# Patient Record
Sex: MALE | Race: WHITE | NOT HISPANIC OR LATINO | URBAN - METROPOLITAN AREA
[De-identification: names, ages, dates, MRNs, and addresses within clinical notes are randomized per-mention and may not be internally consistent; named-entity substitution may affect disease eponyms.]

---

## 2023-07-07 ENCOUNTER — INPATIENT (INPATIENT)
Facility: HOSPITAL | Age: 68
LOS: 6 days | Discharge: HOME | End: 2023-07-14
Attending: INTERNAL MEDICINE | Admitting: INTERNAL MEDICINE
Payer: MEDICARE

## 2023-07-07 VITALS
TEMPERATURE: 98 F | OXYGEN SATURATION: 100 % | RESPIRATION RATE: 18 BRPM | SYSTOLIC BLOOD PRESSURE: 134 MMHG | HEART RATE: 74 BPM | DIASTOLIC BLOOD PRESSURE: 90 MMHG

## 2023-07-07 DIAGNOSIS — N04.9 NEPHROTIC SYNDROME WITH UNSPECIFIED MORPHOLOGIC CHANGES: ICD-10-CM

## 2023-07-07 DIAGNOSIS — Z29.9 ENCOUNTER FOR PROPHYLACTIC MEASURES, UNSPECIFIED: ICD-10-CM

## 2023-07-07 DIAGNOSIS — Z96.651 PRESENCE OF RIGHT ARTIFICIAL KNEE JOINT: Chronic | ICD-10-CM

## 2023-07-07 DIAGNOSIS — I50.9 HEART FAILURE, UNSPECIFIED: ICD-10-CM

## 2023-07-07 DIAGNOSIS — D64.9 ANEMIA, UNSPECIFIED: ICD-10-CM

## 2023-07-07 DIAGNOSIS — J98.4 OTHER DISORDERS OF LUNG: ICD-10-CM

## 2023-07-07 DIAGNOSIS — E87.70 FLUID OVERLOAD, UNSPECIFIED: ICD-10-CM

## 2023-07-07 DIAGNOSIS — R77.8 OTHER SPECIFIED ABNORMALITIES OF PLASMA PROTEINS: ICD-10-CM

## 2023-07-07 LAB
ALBUMIN SERPL ELPH-MCNC: 3.2 G/DL — LOW (ref 3.3–5)
ALP SERPL-CCNC: 76 U/L — SIGNIFICANT CHANGE UP (ref 40–120)
ALT FLD-CCNC: 23 U/L — SIGNIFICANT CHANGE UP (ref 4–41)
ANION GAP SERPL CALC-SCNC: 8 MMOL/L — SIGNIFICANT CHANGE UP (ref 7–14)
APPEARANCE UR: CLEAR — SIGNIFICANT CHANGE UP
AST SERPL-CCNC: 30 U/L — SIGNIFICANT CHANGE UP (ref 4–40)
BACTERIA # UR AUTO: NEGATIVE /HPF — SIGNIFICANT CHANGE UP
BASOPHILS # BLD AUTO: 0.09 K/UL — SIGNIFICANT CHANGE UP (ref 0–0.2)
BASOPHILS NFR BLD AUTO: 0.9 % — SIGNIFICANT CHANGE UP (ref 0–2)
BILIRUB SERPL-MCNC: <0.2 MG/DL — SIGNIFICANT CHANGE UP (ref 0.2–1.2)
BILIRUB UR-MCNC: NEGATIVE — SIGNIFICANT CHANGE UP
BUN SERPL-MCNC: 62 MG/DL — HIGH (ref 7–23)
CALCIUM SERPL-MCNC: 8.8 MG/DL — SIGNIFICANT CHANGE UP (ref 8.4–10.5)
CAST: 0 /LPF — SIGNIFICANT CHANGE UP (ref 0–4)
CHLORIDE SERPL-SCNC: 102 MMOL/L — SIGNIFICANT CHANGE UP (ref 98–107)
CHLORIDE UR-SCNC: 117 MMOL/L — SIGNIFICANT CHANGE UP
CO2 SERPL-SCNC: 29 MMOL/L — SIGNIFICANT CHANGE UP (ref 22–31)
COLOR SPEC: YELLOW — SIGNIFICANT CHANGE UP
CREAT ?TM UR-MCNC: 20 MG/DL — SIGNIFICANT CHANGE UP
CREAT SERPL-MCNC: 1.6 MG/DL — HIGH (ref 0.5–1.3)
DIFF PNL FLD: ABNORMAL
EGFR: 47 ML/MIN/1.73M2 — LOW
EOSINOPHIL # BLD AUTO: 0.16 K/UL — SIGNIFICANT CHANGE UP (ref 0–0.5)
EOSINOPHIL NFR BLD AUTO: 1.6 % — SIGNIFICANT CHANGE UP (ref 0–6)
GLUCOSE SERPL-MCNC: 109 MG/DL — HIGH (ref 70–99)
GLUCOSE UR QL: NEGATIVE MG/DL — SIGNIFICANT CHANGE UP
HCT VFR BLD CALC: 38.3 % — LOW (ref 39–50)
HGB BLD-MCNC: 11.8 G/DL — LOW (ref 13–17)
IANC: 8.53 K/UL — HIGH (ref 1.8–7.4)
IMM GRANULOCYTES NFR BLD AUTO: 0.5 % — SIGNIFICANT CHANGE UP (ref 0–0.9)
KETONES UR-MCNC: NEGATIVE MG/DL — SIGNIFICANT CHANGE UP
LEUKOCYTE ESTERASE UR-ACNC: NEGATIVE — SIGNIFICANT CHANGE UP
LYMPHOCYTES # BLD AUTO: 0.55 K/UL — LOW (ref 1–3.3)
LYMPHOCYTES # BLD AUTO: 5.6 % — LOW (ref 13–44)
MCHC RBC-ENTMCNC: 29.6 PG — SIGNIFICANT CHANGE UP (ref 27–34)
MCHC RBC-ENTMCNC: 30.8 GM/DL — LOW (ref 32–36)
MCV RBC AUTO: 96.2 FL — SIGNIFICANT CHANGE UP (ref 80–100)
MONOCYTES # BLD AUTO: 0.52 K/UL — SIGNIFICANT CHANGE UP (ref 0–0.9)
MONOCYTES NFR BLD AUTO: 5.3 % — SIGNIFICANT CHANGE UP (ref 2–14)
NEUTROPHILS # BLD AUTO: 8.53 K/UL — HIGH (ref 1.8–7.4)
NEUTROPHILS NFR BLD AUTO: 86.1 % — HIGH (ref 43–77)
NITRITE UR-MCNC: NEGATIVE — SIGNIFICANT CHANGE UP
NRBC # BLD: 0 /100 WBCS — SIGNIFICANT CHANGE UP (ref 0–0)
NRBC # FLD: 0 K/UL — SIGNIFICANT CHANGE UP (ref 0–0)
NT-PROBNP SERPL-SCNC: HIGH PG/ML
PH UR: 6 — SIGNIFICANT CHANGE UP (ref 5–8)
PLATELET # BLD AUTO: 247 K/UL — SIGNIFICANT CHANGE UP (ref 150–400)
POTASSIUM SERPL-MCNC: 5 MMOL/L — SIGNIFICANT CHANGE UP (ref 3.5–5.3)
POTASSIUM SERPL-SCNC: 5 MMOL/L — SIGNIFICANT CHANGE UP (ref 3.5–5.3)
POTASSIUM UR-SCNC: 21.8 MMOL/L — SIGNIFICANT CHANGE UP
PROT ?TM UR-MCNC: 194 MG/DL — SIGNIFICANT CHANGE UP
PROT SERPL-MCNC: 6 G/DL — SIGNIFICANT CHANGE UP (ref 6–8.3)
PROT UR-MCNC: 300 MG/DL
RBC # BLD: 3.98 M/UL — LOW (ref 4.2–5.8)
RBC # FLD: 16.7 % — HIGH (ref 10.3–14.5)
RBC CASTS # UR COMP ASSIST: 11 /HPF — HIGH (ref 0–4)
SODIUM SERPL-SCNC: 139 MMOL/L — SIGNIFICANT CHANGE UP (ref 135–145)
SODIUM UR-SCNC: 114 MMOL/L — SIGNIFICANT CHANGE UP
SP GR SPEC: 1.01 — SIGNIFICANT CHANGE UP (ref 1–1.03)
SQUAMOUS # UR AUTO: 0 /HPF — SIGNIFICANT CHANGE UP (ref 0–5)
TROPONIN T, HIGH SENSITIVITY RESULT: 162 NG/L — CRITICAL HIGH
TROPONIN T, HIGH SENSITIVITY RESULT: 170 NG/L — CRITICAL HIGH
UROBILINOGEN FLD QL: 0.2 MG/DL — SIGNIFICANT CHANGE UP (ref 0.2–1)
UUN UR-MCNC: 235 MG/DL — SIGNIFICANT CHANGE UP
WBC # BLD: 9.9 K/UL — SIGNIFICANT CHANGE UP (ref 3.8–10.5)
WBC # FLD AUTO: 9.9 K/UL — SIGNIFICANT CHANGE UP (ref 3.8–10.5)
WBC UR QL: 0 /HPF — SIGNIFICANT CHANGE UP (ref 0–5)

## 2023-07-07 PROCEDURE — 99223 1ST HOSP IP/OBS HIGH 75: CPT

## 2023-07-07 PROCEDURE — 71046 X-RAY EXAM CHEST 2 VIEWS: CPT | Mod: 26

## 2023-07-07 PROCEDURE — 71250 CT THORAX DX C-: CPT | Mod: 26

## 2023-07-07 PROCEDURE — 99285 EMERGENCY DEPT VISIT HI MDM: CPT

## 2023-07-07 RX ORDER — ACETAMINOPHEN 500 MG
650 TABLET ORAL EVERY 6 HOURS
Refills: 0 | Status: DISCONTINUED | OUTPATIENT
Start: 2023-07-07 | End: 2023-07-14

## 2023-07-07 RX ORDER — ONDANSETRON 8 MG/1
4 TABLET, FILM COATED ORAL EVERY 8 HOURS
Refills: 0 | Status: DISCONTINUED | OUTPATIENT
Start: 2023-07-07 | End: 2023-07-14

## 2023-07-07 RX ORDER — FUROSEMIDE 40 MG
60 TABLET ORAL EVERY 24 HOURS
Refills: 0 | Status: DISCONTINUED | OUTPATIENT
Start: 2023-07-07 | End: 2023-07-08

## 2023-07-07 RX ORDER — HEPARIN SODIUM 5000 [USP'U]/ML
5000 INJECTION INTRAVENOUS; SUBCUTANEOUS EVERY 8 HOURS
Refills: 0 | Status: DISCONTINUED | OUTPATIENT
Start: 2023-07-07 | End: 2023-07-08

## 2023-07-07 RX ORDER — LANOLIN ALCOHOL/MO/W.PET/CERES
3 CREAM (GRAM) TOPICAL AT BEDTIME
Refills: 0 | Status: DISCONTINUED | OUTPATIENT
Start: 2023-07-07 | End: 2023-07-14

## 2023-07-07 RX ADMIN — HEPARIN SODIUM 5000 UNIT(S): 5000 INJECTION INTRAVENOUS; SUBCUTANEOUS at 23:19

## 2023-07-07 RX ADMIN — Medication 60 MILLIGRAM(S): at 18:48

## 2023-07-07 NOTE — H&P ADULT - PROBLEM SELECTOR PLAN 4
- Hb 11.8 on presentation, no prior for comparison  - check iron panel, ferritin; suspect may be related to kidney disease

## 2023-07-07 NOTE — H&P ADULT - PROBLEM SELECTOR PLAN 3
#Elevated creatinine  - Cr 1.60 on presentation, no prior baseline for comparison, though known kidney disease  - trend for now  - dose meds renally  - calculate FENa/FEurea  - appreciate nephrology input for likely anasarca-related

## 2023-07-07 NOTE — H&P ADULT - NSHPPHYSICALEXAM_GEN_ALL_CORE
PHYSICAL EXAM:  Vital Signs Last 24 Hrs  T(C): 36.6 (07 Jul 2023 16:19), Max: 36.6 (07 Jul 2023 16:19)  T(F): 97.9 (07 Jul 2023 16:19), Max: 97.9 (07 Jul 2023 16:19)  HR: 84 (07 Jul 2023 16:19) (74 - 84)  BP: 145/98 (07 Jul 2023 16:19) (134/90 - 145/98)  BP(mean): --  RR: 16 (07 Jul 2023 16:19) (16 - 18)  SpO2: 100% (07 Jul 2023 16:19) (100% - 100%)    Parameters below as of 07 Jul 2023 16:19  Patient On (Oxygen Delivery Method): room air      CONSTITUTIONAL: NAD, well-developed, well-groomed  EYES: PERRLA; conjunctiva and sclera clear  ENMT: Moist oral mucosa, no pharyngeal injection or exudates; normal dentition  NECK: Supple, no palpable masses; no thyromegaly  RESPIRATORY: Normal respiratory effort; lungs are clear to auscultation bilaterally  CARDIOVASCULAR: Regular rate and rhythm, normal S1 and S2, no murmur/rub/gallop; Peripheral pulses are 2+ bilaterally; b/l LE 2+ pitting edema with weeping left distal anterior shin wound  ABDOMEN: Nontender to palpation, normoactive bowel sounds, no rebound/guarding; No hepatosplenomegaly  MUSCULOSKELETAL:  no clubbing or cyanosis of digits; no joint swelling or tenderness to palpation  PSYCH: A+O to person, place, and time; affect appropriate  NEUROLOGY: CN 2-12 are intact and symmetric; no gross sensory deficits   SKIN: No rashes; no palpable lesions

## 2023-07-07 NOTE — ED PROVIDER NOTE - OBJECTIVE STATEMENT
68yo M, hx of anasarca, presents with swelling. Reports that he has had anasarca for 2 years and was previously on prednisone and lasix 40. Reports it has become ineffective and was placed on metolazone 5mg once every 3 days. Pharmacy out of stock and swelling is worsening, prompting patient to come to the ED for further evaluation. No chest pain, shortness of breath, nausea, vomiting, abdominal pain.

## 2023-07-07 NOTE — ED PROVIDER NOTE - CLINICAL SUMMARY MEDICAL DECISION MAKING FREE TEXT BOX
Julissa: F/b [facility] for anasarca (leaky kidney). Did not respond fully to Lasix. Was prescribed metolazone. [facility]. Came here to get new prescription. Will call [facility] to see if it has metolazone. If not, check labs and admit for diuresis.

## 2023-07-07 NOTE — H&P ADULT - TIME BILLING
Time-based billing (NON-critical care).     80minutes spent on total encounter; more than 50% of the visit was spent counseling and / or coordinating care by the attending physician.  The necessity of the time spent during the encounter on this date of service was due to:     review of laboratory data, radiology results, consultants' recommendations, documentation in [place], discussion with patient/family, ACP, and interdisciplinary staff (such as , social workers, etc). Interventions were performed as documented above.

## 2023-07-07 NOTE — H&P ADULT - PROBLEM SELECTOR PLAN 1
#Volume overload, likely due to chronic anasarca related to kidney disease/proteinuria, possible new acute decompensated CHF  - [place]BNP elevated [place] [address], no prior for comparison  - troponin elevated 162 to 170; trend to peak  - ECG: NSR, 1st degree AV block  - telemetry monitoring  - start diuresis with furosemide 60mg IV daily; monitor electrolytes closely while being diuresed  - obtain TTE  - will need to gather collateral data from patient’s PMD and nephrologist  - unlikely DVT but will check b/l LE US  - resume home prednisone 20mg po daily; may need PJP ppx given prolonged steroid use  - appreciate cardiology input

## 2023-07-07 NOTE — ED ADULT NURSE NOTE - AS PAIN REST
"Chief Complaint   Patient presents with     Urgent Care     pt c/o ST --hurts to talk drink--HA-x 1 wk R ear pn just got over bout of brochitis --finished meds for that       Initial /64 (Cuff Size: Adult Regular)  Pulse 69  Temp 98.5  F (36.9  C) (Oral)  LMP 11/02/2017  SpO2 99% Estimated body mass index is 26.44 kg/(m^2) as calculated from the following:    Height as of 11/9/17: 5' 2.5\" (1.588 m).    Weight as of 11/9/17: 146 lb 14.4 oz (66.6 kg).  Medication Reconciliation: complete    "
0 (no pain/absence of nonverbal indicators of pain)

## 2023-07-07 NOTE — ED ADULT NURSE REASSESSMENT NOTE - NS ED NURSE REASSESS COMMENT FT1
Patient received in stretcher. AOX4. Respirations even and unlabored. Spontaneous movement of all extremities noted. Pedal edema noted. Denies CP or SOB. Labs drawn.  Comfort and safety maintained. All current care needs met. Care plan continued Cassidy MENSAH

## 2023-07-07 NOTE — ED ADULT NURSE NOTE - NSFALLUNIVINTERV_ED_ALL_ED
Bed/Stretcher in lowest position, wheels locked, appropriate side rails in place/Call bell, personal items and telephone in reach/Instruct patient to call for assistance before getting out of bed/chair/stretcher/Non-slip footwear applied when patient is off stretcher/Tiger to call system/Physically safe environment - no spills, clutter or unnecessary equipment/Purposeful proactive rounding/Room/bathroom lighting operational, light cord in reach

## 2023-07-07 NOTE — ED PROVIDER NOTE - ATTENDING CONTRIBUTION TO CARE
I performed a face-to-face evaluation of the patient and performed a history and physical examination. I agree with the history and physical examination. If this was a PA visit, I personally saw the patient with the PA and performed a substantive portion of the visit including all aspects of the medical decision making.    F/b [facility] for anasarca (leaky kidney). Did not respond fully to Lasix. Was prescribed metolazone. [facility]. Came here to get new prescription. Will call [facility] to see if it has metolazone. If not, check labs and admit for diuresis.

## 2023-07-07 NOTE — H&P ADULT - NSHPSOCIALHISTORY_GEN_ALL_CORE
lives with wife.  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 No past exposures to sand-blasting, glass manufacturing, shipyards, heavy metals.

## 2023-07-07 NOTE — ED ADULT TRIAGE NOTE - CHIEF COMPLAINT QUOTE
Patient c/o edema to abdomen and legs x months. Also was told by his doctor 2 days ago that he has an irregular heart rhythm. Was prescribed a new diuretic but has not been able to pick it up yet. Denies CP, SOB. Pitting edema noted to b/l lower extremities, rhythm noted to be irregular. H/x anasarca, on prednisone and lasix.

## 2023-07-07 NOTE — ED PROVIDER NOTE - PHYSICAL EXAMINATION
Physical Exam:  Gen: NAD, AOx3, non-toxic appearing, able to ambulate without assistance  Head: NCAT  HEENT: EOMI, PEERLA, normal conjunctiva, tongue midline, oral mucosa moist  Lung: CTAB, no respiratory distress, no wheezes/rhonchi/rales B/L, speaking in full sentences  CV: RRR, no murmurs, rubs or gallops  Abd: distended, soft, NT, no guarding, no rigidity, no rebound tenderness  MSK: 3+ swelling of bilateral legs up to the knee, arms, no visible deformities, ROM normal in UE/LE, no back pain  Neuro: No focal sensory or motor deficits  Skin: Warm, well perfused, no rash, no leg swelling  Psych: normal affect, calm

## 2023-07-07 NOTE — ED ADULT NURSE NOTE - OBJECTIVE STATEMENT
Break coverage RN: Lisset, ambulatory with a cane, presents to ED for medication. Patient states he was diagnosed with anasarca (leaky kidney) and prescribed lasix which he didn't respond to and was prescribed a new medication (metolazone) but [facility]'t [facility] so patient came here. Respirations are even and unlabored, sating at 100% on room air, vitals stable, Awaiting orders.

## 2023-07-07 NOTE — ED ADULT NURSE REASSESSMENT NOTE - NS ED NURSE REASSESS COMMENT FT1
Hand off report given to CDU RN No signs of acute distress noted. Patient stable for transport Cassidy MENSAH

## 2023-07-07 NOTE — H&P ADULT - PROBLEM SELECTOR PLAN 2
- troponin elevated 162 to 170; trend to peak  - in setting of kidney disease  - obtain TTE  - telemetry monitoring  - appreciate cardiology input

## 2023-07-07 NOTE — ED PROVIDER NOTE - PROGRESS NOTE DETAILS
Jennifer Ocampo DO (PGY3): Patient to be admitted to telemetry doc for CHF, echo and further work-up. Spoke with Dr. De Dios, will admit to his service.

## 2023-07-07 NOTE — H&P ADULT - HISTORY OF PRESENT ILLNESS
Patient is a 68yo M with PMH significant for anasarca who presented with complaint of worsening bilateral LE edema. This has been occurring for almost two years now, but worsened in the past few months. It is associated with weeping sores in bilateral lower extremities and some stinging pain. He denies changes in vision or hearing, dizziness, lightheadedness, chest pain, shortness of breath, palpitations, abdominal pain, N/V/D, dysuria, paresthesias, or numbness.    Patient reports being diagnosed with anasarca after a kidney biopsy but does not recall the specific disease he was being treated for. He follows with his PMD Dr Kashmir Crabtree and a nephrologist Dr Ritesh Bray. He has been taking prednisone for an extended duration, recently decreased to 20mg po daily. He has also been getting diuretics with furosemide, and recently was meant to take metolazone but could not receive it from the pharmacy yet.     His wife at bedside offered corroborating information. She stated he has been having decreased exercise capacity and has unintentionally lost 30lbs over the past year as well as muscle atrophy. The patient stated that he is still able to climb 2 flights of stairs regularly in his home and walk long distances without needing to stop.       Vital signs significant for: afebrile, normocardic, hypertensive to 145/98, RR 16-18, SpO2 100% on RA    Labs significant for: Hb 11.8, troponin 162 to 170, Cr 1.60 (no prior for comparison), proBNP 40821    Imaging significant for:   - CXR: chronic bilateral apical thickening

## 2023-07-07 NOTE — H&P ADULT - NSHPREVIEWOFSYSTEMS_GEN_ALL_CORE
CONSTITUTIONAL: No fever or fatigue; +weight loss  EYES: No eye pain, visual disturbances, or discharge  ENMT:  No difficulty hearing, tinnitus, vertigo; No sinus or throat pain  NECK: No pain or stiffness  BREASTS: No pain, masses, or nipple discharge  RESPIRATORY: No cough, wheezing, chills or hemoptysis; No shortness of breath  CARDIOVASCULAR: No chest pain, palpitations, dizziness; b/l LE swelling  GASTROINTESTINAL: No abdominal or epigastric pain. No nausea, vomiting, or hematemesis; No diarrhea or constipation. No melena or hematochezia.  GENITOURINARY: No dysuria, frequency, hematuria, or incontinence  NEUROLOGICAL: No headaches, memory loss, loss of strength, numbness, or tremors  SKIN: No itching, burning, rashes, or lesions   LYMPH NODES: No enlarged glands  ENDOCRINE: No heat or cold intolerance; No hair loss  MUSCULOSKELETAL: No joint pain or swelling; No muscle, back, or extremity pain  PSYCHIATRIC: No depression, anxiety, mood swings, or difficulty sleeping  HEME/LYMPH: No easy bruising, or bleeding gums  ALLERGY AND IMMUNOLOGIC: No hives or eczema

## 2023-07-08 LAB
ALBUMIN SERPL ELPH-MCNC: 2.7 G/DL — LOW (ref 3.3–5)
ALP SERPL-CCNC: 68 U/L — SIGNIFICANT CHANGE UP (ref 40–120)
ALT FLD-CCNC: 20 U/L — SIGNIFICANT CHANGE UP (ref 4–41)
ANION GAP SERPL CALC-SCNC: 7 MMOL/L — SIGNIFICANT CHANGE UP (ref 7–14)
AST SERPL-CCNC: 28 U/L — SIGNIFICANT CHANGE UP (ref 4–40)
BASOPHILS # BLD AUTO: 0.08 K/UL — SIGNIFICANT CHANGE UP (ref 0–0.2)
BASOPHILS NFR BLD AUTO: 1.1 % — SIGNIFICANT CHANGE UP (ref 0–2)
BILIRUB SERPL-MCNC: 0.3 MG/DL — SIGNIFICANT CHANGE UP (ref 0.2–1.2)
BUN SERPL-MCNC: 56 MG/DL — HIGH (ref 7–23)
CALCIUM SERPL-MCNC: 8.5 MG/DL — SIGNIFICANT CHANGE UP (ref 8.4–10.5)
CHLORIDE SERPL-SCNC: 103 MMOL/L — SIGNIFICANT CHANGE UP (ref 98–107)
CO2 SERPL-SCNC: 30 MMOL/L — SIGNIFICANT CHANGE UP (ref 22–31)
CREAT SERPL-MCNC: 1.49 MG/DL — HIGH (ref 0.5–1.3)
EGFR: 51 ML/MIN/1.73M2 — LOW
EOSINOPHIL # BLD AUTO: 0.11 K/UL — SIGNIFICANT CHANGE UP (ref 0–0.5)
EOSINOPHIL NFR BLD AUTO: 1.5 % — SIGNIFICANT CHANGE UP (ref 0–6)
FERRITIN SERPL-MCNC: 300 NG/ML — SIGNIFICANT CHANGE UP (ref 30–400)
GLUCOSE BLDC GLUCOMTR-MCNC: 122 MG/DL — HIGH (ref 70–99)
GLUCOSE SERPL-MCNC: 84 MG/DL — SIGNIFICANT CHANGE UP (ref 70–99)
HCT VFR BLD CALC: 32.7 % — LOW (ref 39–50)
HCV AB S/CO SERPL IA: 0.07 S/CO — SIGNIFICANT CHANGE UP (ref 0–0.99)
HCV AB SERPL-IMP: SIGNIFICANT CHANGE UP
HGB BLD-MCNC: 10.4 G/DL — LOW (ref 13–17)
IANC: 6.04 K/UL — SIGNIFICANT CHANGE UP (ref 1.8–7.4)
IMM GRANULOCYTES NFR BLD AUTO: 1.1 % — HIGH (ref 0–0.9)
IRON SATN MFR SERPL: 20 % — SIGNIFICANT CHANGE UP (ref 14–50)
IRON SATN MFR SERPL: 30 UG/DL — LOW (ref 45–165)
LYMPHOCYTES # BLD AUTO: 0.5 K/UL — LOW (ref 1–3.3)
LYMPHOCYTES # BLD AUTO: 6.8 % — LOW (ref 13–44)
MCHC RBC-ENTMCNC: 29.9 PG — SIGNIFICANT CHANGE UP (ref 27–34)
MCHC RBC-ENTMCNC: 31.8 GM/DL — LOW (ref 32–36)
MCV RBC AUTO: 94 FL — SIGNIFICANT CHANGE UP (ref 80–100)
MONOCYTES # BLD AUTO: 0.54 K/UL — SIGNIFICANT CHANGE UP (ref 0–0.9)
MONOCYTES NFR BLD AUTO: 7.3 % — SIGNIFICANT CHANGE UP (ref 2–14)
NEUTROPHILS # BLD AUTO: 6.04 K/UL — SIGNIFICANT CHANGE UP (ref 1.8–7.4)
NEUTROPHILS NFR BLD AUTO: 82.2 % — HIGH (ref 43–77)
NRBC # BLD: 0 /100 WBCS — SIGNIFICANT CHANGE UP (ref 0–0)
NRBC # FLD: 0 K/UL — SIGNIFICANT CHANGE UP (ref 0–0)
PLATELET # BLD AUTO: 220 K/UL — SIGNIFICANT CHANGE UP (ref 150–400)
POTASSIUM SERPL-MCNC: 4.2 MMOL/L — SIGNIFICANT CHANGE UP (ref 3.5–5.3)
POTASSIUM SERPL-SCNC: 4.2 MMOL/L — SIGNIFICANT CHANGE UP (ref 3.5–5.3)
PROT SERPL-MCNC: 5 G/DL — LOW (ref 6–8.3)
RBC # BLD: 3.48 M/UL — LOW (ref 4.2–5.8)
RBC # FLD: 17 % — HIGH (ref 10.3–14.5)
SODIUM SERPL-SCNC: 140 MMOL/L — SIGNIFICANT CHANGE UP (ref 135–145)
TIBC SERPL-MCNC: 150 UG/DL — LOW (ref 220–430)
TROPONIN T, HIGH SENSITIVITY RESULT: 190 NG/L — CRITICAL HIGH
UIBC SERPL-MCNC: 120 UG/DL — SIGNIFICANT CHANGE UP (ref 110–370)
WBC # BLD: 7.35 K/UL — SIGNIFICANT CHANGE UP (ref 3.8–10.5)
WBC # FLD AUTO: 7.35 K/UL — SIGNIFICANT CHANGE UP (ref 3.8–10.5)

## 2023-07-08 PROCEDURE — 93970 EXTREMITY STUDY: CPT | Mod: 26

## 2023-07-08 RX ORDER — BUMETANIDE 0.25 MG/ML
0.5 INJECTION INTRAMUSCULAR; INTRAVENOUS
Qty: 20 | Refills: 0 | Status: DISCONTINUED | OUTPATIENT
Start: 2023-07-08 | End: 2023-07-10

## 2023-07-08 RX ORDER — BUMETANIDE 0.25 MG/ML
1 INJECTION INTRAMUSCULAR; INTRAVENOUS ONCE
Refills: 0 | Status: COMPLETED | OUTPATIENT
Start: 2023-07-08 | End: 2023-07-08

## 2023-07-08 RX ORDER — ENOXAPARIN SODIUM 100 MG/ML
60 INJECTION SUBCUTANEOUS EVERY 12 HOURS
Refills: 0 | Status: DISCONTINUED | OUTPATIENT
Start: 2023-07-08 | End: 2023-07-12

## 2023-07-08 RX ADMIN — BUMETANIDE 1 MILLIGRAM(S): 0.25 INJECTION INTRAMUSCULAR; INTRAVENOUS at 16:02

## 2023-07-08 RX ADMIN — Medication 20 MILLIGRAM(S): at 06:13

## 2023-07-08 RX ADMIN — HEPARIN SODIUM 5000 UNIT(S): 5000 INJECTION INTRAVENOUS; SUBCUTANEOUS at 06:13

## 2023-07-08 RX ADMIN — ENOXAPARIN SODIUM 60 MILLIGRAM(S): 100 INJECTION SUBCUTANEOUS at 14:18

## 2023-07-08 RX ADMIN — BUMETANIDE 2.5 MG/HR: 0.25 INJECTION INTRAMUSCULAR; INTRAVENOUS at 16:04

## 2023-07-08 NOTE — CONSULT NOTE ADULT - TIME BILLING
- Review of records, telemetry, vital signs and daily labs.   - General and cardiovascular physical examination.  - Generation of cardiovascular treatment plan.  - Coordination of care.      Patient was seen and examined by me on 7/8/23,interim events noted,labs and radiology studies reviewed.  Tejas De Dios MD,FACC.  [address] [place], [place] [id number].  [phone]

## 2023-07-08 NOTE — PROGRESS NOTE ADULT - ASSESSMENT
Patient is a 66yo M with PMH significant for anasarca who presented with complaint of worsening bilateral LE edema.

## 2023-07-08 NOTE — CONSULT NOTE ADULT - ASSESSMENT
67y Male with history of nephrotic syndrome presents with volume overload. Nephrology consulted for elevated Scr.    1) Nephrotic syndrome: S/P kidney biopsy 2.5 years ago on chronic steroids? Diagnosis unknown with significant proteinuria (spot urine TP/CR 9.7). Will attempt to contact outpatient nephrologist on 7/10. In interim, will treat volume overload with bumex gtt (start with 1 mg IVP followed by 0.5 mg/hour). Will increase gtt to 1 mg/hour if UO suboptimal. Check FLP. Continue with full dose AC for prevention of thrombotic events as serum albumin < 3.0 (can change to Eliquis on discharge). Can continue with prednisone 20 mg PO daily for now. Ddx includes MURPHY, [place], FSGS.    2) CKD-3a: Baseline Scr unknown but suspect patient with FRANDY due to renal vein congestion versus CKD-3a. Check VA duplex r/o renal vein thrombosis. Check renal US. UA with microscopic hematuria and nephrotic range proteinuria due to underlying glomerular disease. Defer further work up as patient follows with an outpatient nephrologist. Avoid nephrotoxins. Monitor electrolytes.    3) HTN with CKD: BP controlled. Eventual ACE-I/ARB and SGLT2 inhibitor once more euvolemic. Monitor BP.    4) Anasarca: Diuretics as above. F/U TTE. Monitor UO.      [facility] NEPHROLOGY  Carl Schmidt M.D.  Leroy Quintero D.O.  Zuleyka Sherman M.D.  Shayy Garcia, LILO, ANP-C    Telephone: [phone]  Facsimile: [phone]    [address] [place] [address]  
Patient is a 68yo M with PMH significant for anasarca who presented with complaint of worsening bilateral LE edema.        Problem/Plan - 1:  ·  Problem: Volume overload.   ·  Plan: #Volume overload, likely due to chronic anasarca related to kidney disease/proteinuria, possible new acute decompensated CHF  - proBNP elevated to [address], no prior for comparison  - troponin elevated 162 to 170; trend to peak  - ECG: NSR, 1st degree AV block  - tele  - start diuresis with furosemide 60mg IV daily; monitor electrolytes closely while being diuresed  - obtain TTE  - will need to gather collateral data from patient’s PMD and nephrologist  - unlikely DVT but will check b/l LE US  - resume home prednisone 20mg po daily; may need PJP ppx given prolonged steroid use  - appreciate cardiology input.     Problem/Plan - 2:  ·  Problem: Elevated troponin.   ·  Plan: - troponin elevated 162 to 170; trend to peak  - in setting of kidney disease  - obtain TTE  - telemetry monitoring     Problem/Plan - 3:  ·  Problem: Anasarca associated with disorder of kidney.   ·  Plan: #Elevated creatinine  - Cr 1.60 on presentation, no prior baseline for comparison, though known kidney disease  - trend for now  - dose meds renally  - calculate FENa/FEurea  - appreciate nephrology input for likely anasarca-related.

## 2023-07-08 NOTE — PROGRESS NOTE ADULT - SUBJECTIVE AND OBJECTIVE BOX
SUBJECTIVE / OVERNIGHT EVENTS:pt seen and examined  07-08-23     MEDICATIONS  (STANDING):  buMETAnide Infusion 0.5 mG/Hr (2.5 mL/Hr) IV Continuous <Continuous>  enoxaparin Injectable 60 milliGRAM(s) SubCutaneous every 12 hours  predniSONE   Tablet 20 milliGRAM(s) Oral daily    MEDICATIONS  (PRN):  acetaminophen     Tablet .. 650 milliGRAM(s) Oral every 6 hours PRN Temp greater or equal to 38C (100.4F), Mild Pain (1 - 3)  aluminum hydroxide/magnesium hydroxide/simethicone Suspension 30 milliLiter(s) Oral every 4 hours PRN Dyspepsia  melatonin 3 milliGRAM(s) Oral at bedtime PRN Insomnia  ondansetron Injectable 4 milliGRAM(s) IV Push every 8 hours PRN Nausea and/or Vomiting    T(C): 36.9 (07-08-23 @ 14:45), Max: 37.9 (07-07-23 @ 22:05)  HR: 81 (07-08-23 @ 14:45) (75 - 96)  BP: 146/92 (07-08-23 @ 14:45) (121/82 - 172/98)  RR: 18 (07-08-23 @ 14:45) (18 - 18)  SpO2: 100% (07-08-23 @ 14:45) (97% - 100%)    CAPILLARY BLOOD GLUCOSE      POCT Blood Glucose.: 122 mg/dL (08 Jul 2023 15:18)    I&O's Summary    07 Jul 2023 07:01  -  08 Jul 2023 07:00  --------------------------------------------------------  IN: 540 mL / OUT: 200 mL / NET: 340 mL    08 Jul 2023 07:01  -  08 Jul 2023 18:43  --------------------------------------------------------  IN: 0 mL / OUT: 1650 mL / NET: -1650 mL        Constitutional: No fever, fatigue  Skin: No rash.  Eyes: No recent vision problems or eye pain.  ENT: No congestion, ear pain, or sore throat.  Cardiovascular: No chest pain or palpation.  Respiratory: No cough, shortness of breath, congestion, or wheezing.  Gastrointestinal: No abdominal pain, nausea, vomiting, or diarrhea.  Genitourinary: No dysuria.  Musculoskeletal: No joint swelling.  Neurologic: No headache.    PHYSICAL EXAM:  GENERAL: NAD  EYES: EOMI, PERRLA  NECK: Supple, No JVD  CHEST/LUNG: dec breath sounds at bases  HEART:  S1 , S2 +  ABDOMEN: soft, bs+  EXTREMITIES:  no edema  NEUROLOGY:alert awake      LABS:                        10.4   7.35  )-----------( 220      ( 08 Jul 2023 06:40 )             32.7     07-08    140  |  103  |  56<H>  ----------------------------<  84  4.2   |  30  |  1.49<H>    Ca    8.5      08 Jul 2023 06:40    TPro  5.0<L>  /  Alb  2.7<L>  /  TBili  0.3  /  DBili  x   /  AST  28  /  ALT  20  /  AlkPhos  68  07-08          Urinalysis Basic - ( 08 Jul 2023 06:40 )    Color: x / Appearance: x / SG: x / pH: x  Gluc: 84 mg/dL / Ketone: x  / Bili: x / Urobili: x   Blood: x / Protein: x / Nitrite: x   Leuk Esterase: x / RBC: x / WBC x   Sq Epi: x / Non Sq Epi: x / Bacteria: x        RADIOLOGY & ADDITIONAL TESTS:    Imaging Personally Reviewed:    Consultant(s) Notes Reviewed:      Care Discussed with Consultants/Other Providers:

## 2023-07-08 NOTE — PATIENT PROFILE ADULT - FALL HARM RISK - HARM RISK INTERVENTIONS

## 2023-07-08 NOTE — CONSULT NOTE ADULT - SUBJECTIVE AND OBJECTIVE BOX
[facility] NEPHROLOGY- CONSULTATION NOTE    67y Male with history of below presents with volume overload. Nephrology consulted for elevated Scr.    Patient with known history of nephrotic syndrome s/p kidney biopsy 2.5 years ago (diagnosis unknown) for which he follows with an outpatient nephrologist (Dr. Ritehs Bray [phone]). Patient subsequently started on oral steroids which has been tapered down and back up during relapses. No other immunosuppression as per patient. No ACE-I/ARB or SGLT-2 inhibitor. Patient had been on lasix 20 mg PO TID as an outpatient.    REVIEW OF SYSTEMS:  Gen: no fevers  HEENT: no rhinorrhea  Neck: no sore throat  Cards: no chest pain  Resp: no dyspnea  GI: no nausea or vomiting or diarrhea  : no dysuria or hematuria, + foamy urine  Vascular: + LE edema  Derm: no rashes  Neuro: no numbness/tingling    No Known Allergies      Home Medications Reviewed  Hospital Medications:   MEDICATIONS  (STANDING):  buMETAnide Infusion 0.5 mG/Hr (2.5 mL/Hr) IV Continuous <Continuous>  buMETAnide Injectable 1 milliGRAM(s) IV Push once  enoxaparin Injectable 60 milliGRAM(s) SubCutaneous every 12 hours  predniSONE   Tablet 20 milliGRAM(s) Oral daily      PAST MEDICAL & SURGICAL HISTORY:  Anasarca      Status post right knee replacement          FAMILY HISTORY:  FH: hyperlipidemia (Father)        SOCIAL HISTORY:  Denies toxic substance use     VITALS:  T(F): 99.6 (07-08-23 @ 14:09), Max: 100.2 (07-07-23 @ 22:05)  HR: 75 (07-08-23 @ 14:09)  BP: 121/82 (07-08-23 @ 14:09)  RR: 18 (07-08-23 @ 14:09)  SpO2: 100% (07-08-23 @ 14:09)  Wt(kg): --    07-07 @ 07:01  -  07-08 @ 07:00  --------------------------------------------------------  IN: 540 mL / OUT: 200 mL / NET: 340 mL    07-08 @ 07:01 - 07-08 @ 15:21  --------------------------------------------------------  IN: 0 mL / OUT: 750 mL / NET: -750 mL        Weight (kg): 86.273 (07-07 @ 20:33)    PHYSICAL EXAM:  Gen: NAD, calm  HEENT: MMM  Neck: no JVD  Cards: RRR, +S1/S2, no M/G/R  Resp: CTA B/L  GI: soft, NT/ND, NABS  : no CVA tenderness  Vascular: 2+ LE edema B/L with water blisters noted  Derm: no rashes  Neuro: non-focal      LABS:  07-08    140  |  103  |  56<H>  ----------------------------<  84  4.2   |  30  |  1.49<H>    Ca    8.5      08 Jul 2023 06:40    TPro  5.0<L>  /  Alb  2.7<L>  /  TBili  0.3  /  DBili      /  AST  28  /  ALT  20  /  AlkPhos  68  07-08    Creatinine Trend: 1.49 <--, 1.60 <--                        10.4   7.35  )-----------( 220      ( 08 Jul 2023 06:40 )             32.7     Urine Studies:  Urinalysis Basic - ( 08 Jul 2023 06:40 )    Color:  / Appearance:  / SG:  / pH:   Gluc: 84 mg/dL / Ketone:   / Bili:  / Urobili:    Blood:  / Protein:  / Nitrite:    Leuk Esterase:  / RBC:  / WBC    Sq Epi:  / Non Sq Epi:  / Bacteria:       Sodium, Random Urine: 114 mmol/L (07-07 @ 19:59)  Potassium, Random Urine: 21.8 mmol/L (07-07 @ 19:59)  Chloride, Random Urine: 117 mmol/L (07-07 @ 19:59)  Creatinine, Random Urine: 20 mg/dL (07-07 @ 19:59)      RADIOLOGY & ADDITIONAL STUDIES:    < from: CT Chest No Cont (07.07.23 @ 18:39) >  IMPRESSION:  No interstitial lung disease.    Emphysema. Recommend annual lung cancer screening with low-dose chest CT   if the patient meets the criteria.    --- End of Report ---    < end of copied text >      < from: Xray Chest 2 Views PA/Lat (07.07.23 @ 15:25) >  IMPRESSION: Chronic bilateral apical thickening.    --- End of Report ---    < end of copied text >

## 2023-07-08 NOTE — PATIENT PROFILE ADULT - NSPROHMSYMPCOND_GEN_A_NUR
Pt states that lower ext edema occurs while on prednisone  - Checked venous doppler - negative  - ECHO 2/2018 showed Preserved left ventricular systolic function. Stage I   diastolic dysfunction. EF 65%   - repeat ECHO estimated EF 60-65%  - albumin level has been low might contribute to edema  - edema worsening after IVF with chemo  - Strict I&O's  - will repeat ECHO, F/U results - Continue Entecavir 0.5mg daily  - ID- Dr. Webber (recs appreciated) none

## 2023-07-09 LAB
ALBUMIN SERPL ELPH-MCNC: 3.2 G/DL — LOW (ref 3.3–5)
ALP SERPL-CCNC: 83 U/L — SIGNIFICANT CHANGE UP (ref 40–120)
ALT FLD-CCNC: 22 U/L — SIGNIFICANT CHANGE UP (ref 4–41)
ANION GAP SERPL CALC-SCNC: 14 MMOL/L — SIGNIFICANT CHANGE UP (ref 7–14)
AST SERPL-CCNC: 31 U/L — SIGNIFICANT CHANGE UP (ref 4–40)
BASOPHILS # BLD AUTO: 0.07 K/UL — SIGNIFICANT CHANGE UP (ref 0–0.2)
BASOPHILS NFR BLD AUTO: 0.9 % — SIGNIFICANT CHANGE UP (ref 0–2)
BILIRUB SERPL-MCNC: 0.3 MG/DL — SIGNIFICANT CHANGE UP (ref 0.2–1.2)
BUN SERPL-MCNC: 54 MG/DL — HIGH (ref 7–23)
CALCIUM SERPL-MCNC: 9.3 MG/DL — SIGNIFICANT CHANGE UP (ref 8.4–10.5)
CHLORIDE SERPL-SCNC: 94 MMOL/L — LOW (ref 98–107)
CO2 SERPL-SCNC: 32 MMOL/L — HIGH (ref 22–31)
CREAT SERPL-MCNC: 1.52 MG/DL — HIGH (ref 0.5–1.3)
EGFR: 50 ML/MIN/1.73M2 — LOW
EOSINOPHIL # BLD AUTO: 0.07 K/UL — SIGNIFICANT CHANGE UP (ref 0–0.5)
EOSINOPHIL NFR BLD AUTO: 0.9 % — SIGNIFICANT CHANGE UP (ref 0–6)
GLUCOSE SERPL-MCNC: 87 MG/DL — SIGNIFICANT CHANGE UP (ref 70–99)
HCT VFR BLD CALC: 39.7 % — SIGNIFICANT CHANGE UP (ref 39–50)
HGB BLD-MCNC: 12.5 G/DL — LOW (ref 13–17)
IANC: 6.13 K/UL — SIGNIFICANT CHANGE UP (ref 1.8–7.4)
IMM GRANULOCYTES NFR BLD AUTO: 0.5 % — SIGNIFICANT CHANGE UP (ref 0–0.9)
LYMPHOCYTES # BLD AUTO: 0.79 K/UL — LOW (ref 1–3.3)
LYMPHOCYTES # BLD AUTO: 10.4 % — LOW (ref 13–44)
MAGNESIUM SERPL-MCNC: 2.5 MG/DL — SIGNIFICANT CHANGE UP (ref 1.6–2.6)
MCHC RBC-ENTMCNC: 29.6 PG — SIGNIFICANT CHANGE UP (ref 27–34)
MCHC RBC-ENTMCNC: 31.5 GM/DL — LOW (ref 32–36)
MCV RBC AUTO: 93.9 FL — SIGNIFICANT CHANGE UP (ref 80–100)
MONOCYTES # BLD AUTO: 0.47 K/UL — SIGNIFICANT CHANGE UP (ref 0–0.9)
MONOCYTES NFR BLD AUTO: 6.2 % — SIGNIFICANT CHANGE UP (ref 2–14)
NEUTROPHILS # BLD AUTO: 6.13 K/UL — SIGNIFICANT CHANGE UP (ref 1.8–7.4)
NEUTROPHILS NFR BLD AUTO: 81.1 % — HIGH (ref 43–77)
NRBC # BLD: 0 /100 WBCS — SIGNIFICANT CHANGE UP (ref 0–0)
NRBC # FLD: 0 K/UL — SIGNIFICANT CHANGE UP (ref 0–0)
PHOSPHATE SERPL-MCNC: 3.5 MG/DL — SIGNIFICANT CHANGE UP (ref 2.5–4.5)
PLATELET # BLD AUTO: 254 K/UL — SIGNIFICANT CHANGE UP (ref 150–400)
POTASSIUM SERPL-MCNC: 3.7 MMOL/L — SIGNIFICANT CHANGE UP (ref 3.5–5.3)
POTASSIUM SERPL-SCNC: 3.7 MMOL/L — SIGNIFICANT CHANGE UP (ref 3.5–5.3)
PROT SERPL-MCNC: 6 G/DL — SIGNIFICANT CHANGE UP (ref 6–8.3)
RBC # BLD: 4.23 M/UL — SIGNIFICANT CHANGE UP (ref 4.2–5.8)
RBC # FLD: 16.6 % — HIGH (ref 10.3–14.5)
SODIUM SERPL-SCNC: 140 MMOL/L — SIGNIFICANT CHANGE UP (ref 135–145)
WBC # BLD: 7.57 K/UL — SIGNIFICANT CHANGE UP (ref 3.8–10.5)
WBC # FLD AUTO: 7.57 K/UL — SIGNIFICANT CHANGE UP (ref 3.8–10.5)

## 2023-07-09 PROCEDURE — 76770 US EXAM ABDO BACK WALL COMP: CPT | Mod: 26

## 2023-07-09 PROCEDURE — 93010 ELECTROCARDIOGRAM REPORT: CPT

## 2023-07-09 RX ORDER — POTASSIUM CHLORIDE 20 MEQ
40 PACKET (EA) ORAL ONCE
Refills: 0 | Status: COMPLETED | OUTPATIENT
Start: 2023-07-09 | End: 2023-07-09

## 2023-07-09 RX ORDER — PANTOPRAZOLE SODIUM 20 MG/1
40 TABLET, DELAYED RELEASE ORAL
Refills: 0 | Status: DISCONTINUED | OUTPATIENT
Start: 2023-07-09 | End: 2023-07-14

## 2023-07-09 RX ADMIN — ENOXAPARIN SODIUM 60 MILLIGRAM(S): 100 INJECTION SUBCUTANEOUS at 02:22

## 2023-07-09 RX ADMIN — ENOXAPARIN SODIUM 60 MILLIGRAM(S): 100 INJECTION SUBCUTANEOUS at 13:24

## 2023-07-09 RX ADMIN — Medication 1 TABLET(S): at 13:24

## 2023-07-09 RX ADMIN — Medication 20 MILLIGRAM(S): at 05:25

## 2023-07-09 RX ADMIN — Medication 40 MILLIEQUIVALENT(S): at 13:24

## 2023-07-09 NOTE — PROGRESS NOTE ADULT - SUBJECTIVE AND OBJECTIVE BOX
[facility] NEPHROLOGY- PROGRESS NOTE    67y Male with history of nephrotic syndrome presents with volume overload. Nephrology consulted for elevated Scr.    REVIEW OF SYSTEMS:  Gen: no fevers  Cards: no chest pain  Resp: no dyspnea  GI: no nausea or vomiting or diarrhea  Vascular: + LE edema improving    No Known Allergies      Hospital Medications: Medications reviewed    VITALS:  T(F): 97.6 (07-09-23 @ 05:40), Max: 99.8 (07-08-23 @ 22:00)  HR: 66 (07-09-23 @ 05:40)  BP: 126/62 (07-09-23 @ 05:40)  RR: 18 (07-09-23 @ 05:40)  SpO2: 100% (07-09-23 @ 05:40)  Wt(kg): --    Weight (kg): 86.273 (07-07 @ 20:33)    07-08 @ 07:01  -  07-09 @ 07:00  --------------------------------------------------------  IN: 100 mL / OUT: 5350 mL / NET: -5250 mL    07-09 @ 07:01  -  07-09 @ 11:51  --------------------------------------------------------  IN: 200 mL / OUT: 1580 mL / NET: -1380 mL        PHYSICAL EXAM:    Gen: NAD, calm  Cards: RRR, +S1/S2, no M/G/R  Resp: CTA B/L  GI: soft, NT/ND, NABS  Vascular: 1+ LE edema B/L    LABS:  07-09    140  |  94<L>  |  54<H>  ----------------------------<  87  3.7   |  32<H>  |  1.52<H>    Ca    9.3      09 Jul 2023 04:45  Phos  3.5     07-09  Mg     2.50     07-09    TPro  6.0  /  Alb  3.2<L>  /  TBili  0.3  /  DBili      /  AST  31  /  ALT  22  /  AlkPhos  83  07-09    Creatinine Trend: 1.52 <--, 1.49 <--, 1.60 <--                        12.5   7.57  )-----------( 254      ( 09 Jul 2023 04:45 )             39.7     Urine Studies:  Urinalysis Basic - ( 09 Jul 2023 04:45 )    Color:  / Appearance:  / SG:  / pH:   Gluc: 87 mg/dL / Ketone:   / Bili:  / Urobili:    Blood:  / Protein:  / Nitrite:    Leuk Esterase:  / RBC:  / WBC    Sq Epi:  / Non Sq Epi:  / Bacteria:       Sodium, Random Urine: 114 mmol/L (07-07 @ 19:59)  Potassium, Random Urine: 21.8 mmol/L (07-07 @ 19:59)  Chloride, Random Urine: 117 mmol/L (07-07 @ 19:59)  Creatinine, Random Urine: 20 mg/dL (07-07 @ 19:59)      RADIOLOGY & ADDITIONAL STUDIES:    < from: US Kidney and Bladder (07.09.23 @ 09:37) >  FINDINGS:  Right kidney: 10.2 cm. No renal mass, hydronephrosis or calculi.    Left kidney: 10.7 cm. No renal mass, hydronephrosis or calculi.    Urinary bladder: Visualized portions unremarkable.    IMPRESSION:  No evidence for bilateral hydronephrosis.        --- End of Report ---    < end of copied text >

## 2023-07-09 NOTE — CHART NOTE - NSCHARTNOTEFT_GEN_A_CORE
Notified by RN that patient had ?Afib on tele. Tele reviewed and EKG done showing NSR w/ PACs. Pt asx. Will continue to monitor. Discussed with Dr. De Dios.

## 2023-07-09 NOTE — PROGRESS NOTE ADULT - ASSESSMENT
67y Male with history of nephrotic syndrome presents with volume overload. Nephrology consulted for elevated Scr.    1) Nephrotic syndrome: S/P kidney biopsy 2.5 years ago on chronic steroids? Diagnosis unknown with significant proteinuria (spot urine TP/CR 9.7). Will attempt to contact outpatient nephrologist on 7/10. In interim, continue with bumex gtt @ 0.5 mg/hour (UO 5.3L). Check FLP. Given serum albumin > 3.0, no need for full dose AC at this time as patient low risk for thrombotic events from nephrotic syndrome. Can continue with prednisone 20 mg PO daily for now but start calcium plus vitamin D and PPI as patient on chronic steroids. Ddx includes MURPHY, [place], FSGS.    2) CKD-3a: Baseline Scr unknown but suspect patient with FRANDY due to renal vein congestion versus CKD-3a. F/U VA duplex r/o renal vein thrombosis. Renal US without hydronephrosis. UA with microscopic hematuria and nephrotic range proteinuria due to underlying glomerular disease. Defer further work up as patient follows with an outpatient nephrologist. Avoid nephrotoxins. Monitor electrolytes.    3) HTN with CKD: BP controlled. Eventual ACE-I/ARB and SGLT2 inhibitor once more euvolemic. Monitor BP.    4) Anasarca: Improving. Diuretics as above. Check blood gas r/o contraction alkalosis. F/U TTE. Monitor UO.      [facility] NEPHROLOGY  Carl Schmidt M.D.  Leroy Quintero D.O.  Zuleyka Sherman M.D.  Shayy Garcia, MSN, ANP-C    Telephone: [phone]  Facsimile: [phone]    [id number] [place] [id number]

## 2023-07-09 NOTE — PROGRESS NOTE ADULT - SUBJECTIVE AND OBJECTIVE BOX
PATIENT SEEN AND EXAMINED ON :- 7/9/23  DATE OF SERVICE:  7/9/23           Interim events noted,Labs ,Radiological studies and Cardiology tests reviewed .    MR#[id number]  [facility] COURSE: HPI:  Patient is a 68yo M with PMH significant for anasarca who presented with complaint of worsening bilateral LE edema. This has been occurring for almost two years now, but worsened in the past few months. It is associated with weeping sores in bilateral lower extremities and some stinging pain. He denies changes in vision or hearing, dizziness, lightheadedness, chest pain, shortness of breath, palpitations, abdominal pain, N/V/D, dysuria, paresthesias, or numbness.    Patient reports being diagnosed with anasarca after a kidney biopsy but does not recall the specific disease he was being treated for. He follows with his PMD Dr Kashmir Crabtree and a nephrologist Dr Ritesh Bray. He has been taking prednisone for an extended duration, recently decreased to 20mg po daily. He has also been getting diuretics with furosemide, and recently was meant to take metolazone but could not receive it from the pharmacy yet.     His wife at bedside offered corroborating information. She stated he has been having decreased exercise capacity and has unintentionally lost 30lbs over the past year as well as muscle atrophy. The patient stated that he is still able to climb 2 flights of stairs regularly in his home and walk long distances without needing to stop.       Vital signs significant for: afebrile, normocardic, hypertensive to 145/98, RR 16-18, SpO2 100% on RA    Labs significant for: Hb 11.8, troponin 162 to 170, Cr 1.60 (no prior for comparison), proBNP [id number]    Imaging significant for:   - CXR: chronic bilateral apical thickening   (07 Jul 2023 18:04)      INTERIM EVENTS:Patient seen at bedside ,interim events noted.      PMH -reviewed admission note, no change since admission  HEART FAILURE: Acute[ ]Chronic[ ] Systolic[ ] Diastolic[ ] Combined Systolic and Diastolic[ ]  CAD[ ] CABG[ ] PCI[ ]  DEVICES[ ] PPM[ ] ICD[ ] ILR[ ]  ATRIAL FIBRILLATION[ ] Paroxysmal[ ] Permanent[ ] CHADS2-[  ]  FRANDY[ ] CKD1[ ] CKD2[ ] CKD3[ ] CKD4[ ] ESRD[ ]  COPD[ ] HTN[ ]   DM[ ] Type1[ ] Type 2[ ]   CVA[ ] Paresis[ ]    AMBULATION: Assisted[ ] Cane/walker[ ] Independent[ ]    MEDICATIONS  (STANDING):  buMETAnide Infusion 0.5 mG/Hr (2.5 mL/Hr) IV Continuous <Continuous>  calcium carbonate 1250 mG  + Vitamin D (OsCal 500 + D) 1 Tablet(s) Oral two times a day  enoxaparin Injectable 60 milliGRAM(s) SubCutaneous every 12 hours  pantoprazole    Tablet 40 milliGRAM(s) Oral before breakfast  predniSONE   Tablet 20 milliGRAM(s) Oral daily    MEDICATIONS  (PRN):  acetaminophen     Tablet .. 650 milliGRAM(s) Oral every 6 hours PRN Temp greater or equal to 38C (100.4F), Mild Pain (1 - 3)  aluminum hydroxide/magnesium hydroxide/simethicone Suspension 30 milliLiter(s) Oral every 4 hours PRN Dyspepsia  melatonin 3 milliGRAM(s) Oral at bedtime PRN Insomnia  ondansetron Injectable 4 milliGRAM(s) IV Push every 8 hours PRN Nausea and/or Vomiting            REVIEW OF SYSTEMS:  Constitutional: [ ] fever, [ ]weight loss,  [ ]fatigue [ ]weight gain  Eyes: [ ] visual changes  Respiratory: [ ]shortness of breath;  [ ] cough, [ ]wheezing, [ ]chills, [ ]hemoptysis  Cardiovascular: [ ] chest pain, [ ]palpitations, [ ]dizziness,  [ ]leg swelling[ ]orthopnea[ ]PND  Gastrointestinal: [ ] abdominal pain, [ ]nausea, [ ]vomiting,  [ ]diarrhea [ ]Constipation [ ]Melena  Genitourinary: [ ] dysuria, [ ] hematuria [ ]Osorio  Neurologic: [ ] headaches [ ] tremors[ ]weakness [ ]Paralysis Right[ ] Left[ ]  Skin: [ ] itching, [ ]burning, [ ] rashes  Endocrine: [ ] heat or cold intolerance  Musculoskeletal: [ ] joint pain or swelling; [ ] muscle, back, or extremity pain  Psychiatric: [ ] depression, [ ]anxiety, [ ]mood swings, or [ ]difficulty sleeping  Hematologic: [ ] easy bruising, [ ] bleeding gums    [ ] All remaining systems negative except as per above.   [ ]Unable to obtain.  [x] No change in ROS since admission      Vital Signs Last 24 Hrs  T(C): 37.2 (09 Jul 2023 22:05), Max: 37.2 (09 Jul 2023 22:05)  T(F): 99 (09 Jul 2023 22:05), Max: 99 (09 Jul 2023 22:05)  HR: 70 (09 Jul 2023 22:05) (65 - 84)  BP: 121/67 (09 Jul 2023 22:05) (108/79 - 130/65)  BP(mean): --  RR: 18 (09 Jul 2023 22:05) (18 - 18)  SpO2: 100% (09 Jul 2023 22:05) (96% - 100%)    Parameters below as of 09 Jul 2023 22:05  Patient On (Oxygen Delivery Method): room air      I&O's Summary    08 Jul 2023 07:01  -  09 Jul 2023 07:00  --------------------------------------------------------  IN: 100 mL / OUT: 5350 mL / NET: -5250 mL    09 Jul 2023 07:01  -  10 Jul 2023 00:11  --------------------------------------------------------  IN: 500 mL / OUT: 2560 mL / NET: -2060 mL        PHYSICAL EXAM:  General: No acute distress BMI-  HEENT: EOMI, PERRL  Neck: Supple, [ ] JVD  Lungs: Equal air entry bilaterally; [ ] rales [ ] wheezing [ ] rhonchi  Heart: Regular rate and rhythm; [x ] murmur   2/6 [ x] systolic [ ] diastolic [ ] radiation[ ] rubs [ ]  gallops  Abdomen: Nontender, bowel sounds present  Extremities: No clubbing, cyanosis, [ ] edema [ ]Pulses  equal and intact  Nervous system:  Alert & Oriented X3, no focal deficits  Psychiatric: Normal affect  Skin: No rashes or lesions    LABS:  07-09    140  |  94<L>  |  54<H>  ----------------------------<  87  3.7   |  32<H>  |  1.52<H>    Ca    9.3      09 Jul 2023 04:45  Phos  3.5     07-09  Mg     2.50     07-09    TPro  6.0  /  Alb  3.2<L>  /  TBili  0.3  /  DBili  x   /  AST  31  /  ALT  22  /  AlkPhos  83  07-09    Creatinine Trend: 1.52<--, 1.49<--, 1.60<--                        12.5   7.57  )-----------( 254      ( 09 Jul 2023 04:45 )             39.7

## 2023-07-09 NOTE — PROGRESS NOTE ADULT - SUBJECTIVE AND OBJECTIVE BOX
SARAIMILANA JAMA  67y  Male      Patient is a 67y old  Male who presents with a chief complaint of b/l LE edema (09 Jul 2023 11:50)  Patient was seen and examined.chart reviewed.  comfortable,nad,no sob,no cp    REVIEW OF SYSTEMS:  CONSTITUTIONAL: No fever  RESPIRATORY: No cough, hemoptysis or shortness of breath  CARDIOVASCULAR: No chest pain, palpitations, dizziness, or leg swelling  GASTROINTESTINAL: No abdominal pain. nausea, vomiting, hematemesis  GENITOURINARY: No dysuria, frequency, hematuria   NEUROLOGICAL: No headaches, no dizziness  MUSCULOSKELETAL: No joint pain or swelling;     INTERVAL HPI/OVERNIGHT EVENTS:  T(C): 36.8 (07-09-23 @ 17:40), Max: 37.7 (07-08-23 @ 22:00)  HR: 84 (07-09-23 @ 17:40) (65 - 84)  BP: 108/79 (07-09-23 @ 17:40) (108/79 - 133/79)  RR: 18 (07-09-23 @ 17:40) (17 - 18)  SpO2: 96% (07-09-23 @ 17:40) (96% - 100%)  Wt(kg): --  I&O's Summary    08 Jul 2023 07:01  -  09 Jul 2023 07:00  --------------------------------------------------------  IN: 100 mL / OUT: 5350 mL / NET: -5250 mL    09 Jul 2023 07:01  -  09 Jul 2023 21:55  --------------------------------------------------------  IN: 500 mL / OUT: 2560 mL / NET: -2060 mL      T(C): 36.8 (07-09-23 @ 17:40), Max: 37.7 (07-08-23 @ 22:00)  HR: 84 (07-09-23 @ 17:40) (65 - 84)  BP: 108/79 (07-09-23 @ 17:40) (108/79 - 133/79)  RR: 18 (07-09-23 @ 17:40) (17 - 18)  SpO2: 96% (07-09-23 @ 17:40) (96% - 100%)  Wt(kg): --Vital Signs Last 24 Hrs  T(C): 36.8 (09 Jul 2023 17:40), Max: 37.7 (08 Jul 2023 22:00)  T(F): 98.2 (09 Jul 2023 17:40), Max: 99.8 (08 Jul 2023 22:00)  HR: 84 (09 Jul 2023 17:40) (65 - 84)  BP: 108/79 (09 Jul 2023 17:40) (108/79 - 133/79)  BP(mean): --  RR: 18 (09 Jul 2023 17:40) (17 - 18)  SpO2: 96% (09 Jul 2023 17:40) (96% - 100%)    Parameters below as of 09 Jul 2023 17:40  Patient On (Oxygen Delivery Method): room air        LABS:                        12.5   7.57  )-----------( 254      ( 09 Jul 2023 04:45 )             39.7     07-09    140  |  94<L>  |  54<H>  ----------------------------<  87  3.7   |  32<H>  |  1.52<H>    Ca    9.3      09 Jul 2023 04:45  Phos  3.5     07-09  Mg     2.50     07-09    TPro  6.0  /  Alb  3.2<L>  /  TBili  0.3  /  DBili  x   /  AST  31  /  ALT  22  /  AlkPhos  83  07-09      Urinalysis Basic - ( 09 Jul 2023 04:45 )    Color: x / Appearance: x / SG: x / pH: x  Gluc: 87 mg/dL / Ketone: x  / Bili: x / Urobili: x   Blood: x / Protein: x / Nitrite: x   Leuk Esterase: x / RBC: x / WBC x   Sq Epi: x / Non Sq Epi: x / Bacteria: x      CAPILLARY BLOOD GLUCOSE            Urinalysis Basic - ( 09 Jul 2023 04:45 )    Color: x / Appearance: x / SG: x / pH: x  Gluc: 87 mg/dL / Ketone: x  / Bili: x / Urobili: x   Blood: x / Protein: x / Nitrite: x   Leuk Esterase: x / RBC: x / WBC x   Sq Epi: x / Non Sq Epi: x / Bacteria: x        PAST MEDICAL & SURGICAL HISTORY:  Anasarca      Status post right knee replacement          MEDICATIONS  (STANDING):  buMETAnide Infusion 0.5 mG/Hr (2.5 mL/Hr) IV Continuous <Continuous>  calcium carbonate 1250 mG  + Vitamin D (OsCal 500 + D) 1 Tablet(s) Oral two times a day  enoxaparin Injectable 60 milliGRAM(s) SubCutaneous every 12 hours  pantoprazole    Tablet 40 milliGRAM(s) Oral before breakfast  predniSONE   Tablet 20 milliGRAM(s) Oral daily    MEDICATIONS  (PRN):  acetaminophen     Tablet .. 650 milliGRAM(s) Oral every 6 hours PRN Temp greater or equal to 38C (100.4F), Mild Pain (1 - 3)  aluminum hydroxide/magnesium hydroxide/simethicone Suspension 30 milliLiter(s) Oral every 4 hours PRN Dyspepsia  melatonin 3 milliGRAM(s) Oral at bedtime PRN Insomnia  ondansetron Injectable 4 milliGRAM(s) IV Push every 8 hours PRN Nausea and/or Vomiting        RADIOLOGY & ADDITIONAL TESTS:    Imaging Personally Reviewed:  [ ] YES  [ ] NO    Consultant(s) Notes Reviewed:  [ x] YES  [ ] NO    PHYSICAL EXAM:  GENERAL: Alert and awake lying in bed in no distress  HEAD:  Atraumatic, Normocephalic  EYES: EOMI, ELLIOT, conjunctiva and sclera clear  NECK: Supple, No JVD, Normal thyroid  NERVOUS SYSTEM:  Alert & Oriented X3, Motor and sensory systems are intact,   CHEST/LUNG: Bilateral clear breath sounds, no rhochi, no wheezing, no crepitations,  HEART: Regular rate and rhythm; No murmurs, rubs, or gallops  ABDOMEN: Soft, Nontender, Nondistended; Bowel sounds present  EXTREMITIES:   Peripheral Pulses are palpable,   edema ++        Care Discussed with Consultants/Other Providers [ x] YES  [ ] NO      Code Status: [] Full Code [] DNR [] DNI [] Goals of Care:   Disposition: [] ICU [] Stroke Unit [] RCU []PCU []Floor [] Discharge Home         [facility]

## 2023-07-10 LAB
ALBUMIN SERPL ELPH-MCNC: 2.8 G/DL — LOW (ref 3.3–5)
ALP SERPL-CCNC: 75 U/L — SIGNIFICANT CHANGE UP (ref 40–120)
ALT FLD-CCNC: 22 U/L — SIGNIFICANT CHANGE UP (ref 4–41)
ANION GAP SERPL CALC-SCNC: 10 MMOL/L — SIGNIFICANT CHANGE UP (ref 7–14)
AST SERPL-CCNC: 24 U/L — SIGNIFICANT CHANGE UP (ref 4–40)
BASE EXCESS BLDV CALC-SCNC: 15.6 MMOL/L — HIGH (ref -2–3)
BASOPHILS # BLD AUTO: 0.07 K/UL — SIGNIFICANT CHANGE UP (ref 0–0.2)
BASOPHILS NFR BLD AUTO: 1.1 % — SIGNIFICANT CHANGE UP (ref 0–2)
BILIRUB SERPL-MCNC: <0.2 MG/DL — SIGNIFICANT CHANGE UP (ref 0.2–1.2)
BUN SERPL-MCNC: 56 MG/DL — HIGH (ref 7–23)
CALCIUM SERPL-MCNC: 9.1 MG/DL — SIGNIFICANT CHANGE UP (ref 8.4–10.5)
CHLORIDE SERPL-SCNC: 93 MMOL/L — LOW (ref 98–107)
CHOLEST SERPL-MCNC: 319 MG/DL — HIGH
CO2 BLDV-SCNC: 42.9 MMOL/L — HIGH (ref 22–26)
CO2 SERPL-SCNC: 34 MMOL/L — HIGH (ref 22–31)
CREAT SERPL-MCNC: 1.75 MG/DL — HIGH (ref 0.5–1.3)
EGFR: 42 ML/MIN/1.73M2 — LOW
EOSINOPHIL # BLD AUTO: 0.09 K/UL — SIGNIFICANT CHANGE UP (ref 0–0.5)
EOSINOPHIL NFR BLD AUTO: 1.4 % — SIGNIFICANT CHANGE UP (ref 0–6)
GAS PNL BLDV: SIGNIFICANT CHANGE UP
GLUCOSE SERPL-MCNC: 87 MG/DL — SIGNIFICANT CHANGE UP (ref 70–99)
HCO3 BLDV-SCNC: 41 MMOL/L — HIGH (ref 22–29)
HCT VFR BLD CALC: 40.3 % — SIGNIFICANT CHANGE UP (ref 39–50)
HDLC SERPL-MCNC: 95 MG/DL — SIGNIFICANT CHANGE UP
HGB BLD-MCNC: 12.8 G/DL — LOW (ref 13–17)
IANC: 5.08 K/UL — SIGNIFICANT CHANGE UP (ref 1.8–7.4)
IMM GRANULOCYTES NFR BLD AUTO: 0.3 % — SIGNIFICANT CHANGE UP (ref 0–0.9)
LIPID PNL WITH DIRECT LDL SERPL: 188 MG/DL — HIGH
LYMPHOCYTES # BLD AUTO: 0.65 K/UL — LOW (ref 1–3.3)
LYMPHOCYTES # BLD AUTO: 10.2 % — LOW (ref 13–44)
MAGNESIUM SERPL-MCNC: 2.1 MG/DL — SIGNIFICANT CHANGE UP (ref 1.6–2.6)
MCHC RBC-ENTMCNC: 29.7 PG — SIGNIFICANT CHANGE UP (ref 27–34)
MCHC RBC-ENTMCNC: 31.8 GM/DL — LOW (ref 32–36)
MCV RBC AUTO: 93.5 FL — SIGNIFICANT CHANGE UP (ref 80–100)
MONOCYTES # BLD AUTO: 0.48 K/UL — SIGNIFICANT CHANGE UP (ref 0–0.9)
MONOCYTES NFR BLD AUTO: 7.5 % — SIGNIFICANT CHANGE UP (ref 2–14)
NEUTROPHILS # BLD AUTO: 5.08 K/UL — SIGNIFICANT CHANGE UP (ref 1.8–7.4)
NEUTROPHILS NFR BLD AUTO: 79.5 % — HIGH (ref 43–77)
NON HDL CHOLESTEROL: 224 MG/DL — HIGH
NRBC # BLD: 0 /100 WBCS — SIGNIFICANT CHANGE UP (ref 0–0)
NRBC # FLD: 0 K/UL — SIGNIFICANT CHANGE UP (ref 0–0)
PCO2 BLDV: 53 MMHG — SIGNIFICANT CHANGE UP (ref 42–55)
PH BLDV: 7.5 — HIGH (ref 7.32–7.43)
PHOSPHATE SERPL-MCNC: 3.8 MG/DL — SIGNIFICANT CHANGE UP (ref 2.5–4.5)
PLATELET # BLD AUTO: 264 K/UL — SIGNIFICANT CHANGE UP (ref 150–400)
PO2 BLDV: 29 MMHG — SIGNIFICANT CHANGE UP (ref 25–45)
POTASSIUM SERPL-MCNC: 3.5 MMOL/L — SIGNIFICANT CHANGE UP (ref 3.5–5.3)
POTASSIUM SERPL-SCNC: 3.5 MMOL/L — SIGNIFICANT CHANGE UP (ref 3.5–5.3)
PROT SERPL-MCNC: 6 G/DL — SIGNIFICANT CHANGE UP (ref 6–8.3)
RBC # BLD: 4.31 M/UL — SIGNIFICANT CHANGE UP (ref 4.2–5.8)
RBC # FLD: 16.5 % — HIGH (ref 10.3–14.5)
SAO2 % BLDV: 42.9 % — LOW (ref 67–88)
SODIUM SERPL-SCNC: 137 MMOL/L — SIGNIFICANT CHANGE UP (ref 135–145)
TRIGL SERPL-MCNC: 179 MG/DL — HIGH
WBC # BLD: 6.39 K/UL — SIGNIFICANT CHANGE UP (ref 3.8–10.5)
WBC # FLD AUTO: 6.39 K/UL — SIGNIFICANT CHANGE UP (ref 3.8–10.5)

## 2023-07-10 PROCEDURE — 93306 TTE W/DOPPLER COMPLETE: CPT | Mod: 26

## 2023-07-10 RX ORDER — ACETAZOLAMIDE 250 MG/1
500 TABLET ORAL ONCE
Refills: 0 | Status: COMPLETED | OUTPATIENT
Start: 2023-07-10 | End: 2023-07-10

## 2023-07-10 RX ORDER — APIXABAN 2.5 MG/1
1 TABLET, FILM COATED ORAL
Qty: 60 | Refills: 0
Start: 2023-07-10 | End: 2023-08-08

## 2023-07-10 RX ORDER — POTASSIUM CHLORIDE 20 MEQ
20 PACKET (EA) ORAL
Refills: 0 | Status: COMPLETED | OUTPATIENT
Start: 2023-07-10 | End: 2023-07-11

## 2023-07-10 RX ORDER — ATORVASTATIN CALCIUM 80 MG/1
20 TABLET, FILM COATED ORAL AT BEDTIME
Refills: 0 | Status: DISCONTINUED | OUTPATIENT
Start: 2023-07-10 | End: 2023-07-14

## 2023-07-10 RX ADMIN — ENOXAPARIN SODIUM 60 MILLIGRAM(S): 100 INJECTION SUBCUTANEOUS at 05:46

## 2023-07-10 RX ADMIN — ENOXAPARIN SODIUM 60 MILLIGRAM(S): 100 INJECTION SUBCUTANEOUS at 17:02

## 2023-07-10 RX ADMIN — Medication 1 TABLET(S): at 17:02

## 2023-07-10 RX ADMIN — Medication 20 MILLIGRAM(S): at 05:45

## 2023-07-10 RX ADMIN — ATORVASTATIN CALCIUM 20 MILLIGRAM(S): 80 TABLET, FILM COATED ORAL at 21:45

## 2023-07-10 RX ADMIN — ACETAZOLAMIDE 500 MILLIGRAM(S): 250 TABLET ORAL at 18:17

## 2023-07-10 RX ADMIN — Medication 20 MILLIEQUIVALENT(S): at 17:02

## 2023-07-10 RX ADMIN — BUMETANIDE 2.5 MG/HR: 0.25 INJECTION INTRAMUSCULAR; INTRAVENOUS at 03:14

## 2023-07-10 RX ADMIN — Medication 1 TABLET(S): at 05:45

## 2023-07-10 RX ADMIN — PANTOPRAZOLE SODIUM 40 MILLIGRAM(S): 20 TABLET, DELAYED RELEASE ORAL at 05:46

## 2023-07-10 RX ADMIN — Medication 20 MILLIEQUIVALENT(S): at 21:45

## 2023-07-10 NOTE — PROGRESS NOTE ADULT - SUBJECTIVE AND OBJECTIVE BOX
[facility] NEPHROLOGY- PROGRESS NOTE    67y Male with history of nephrotic syndrome presents with volume overload. Nephrology consulted for elevated Scr.    REVIEW OF SYSTEMS:  Gen: no fevers  Cards: no chest pain  Resp: no dyspnea  GI: no nausea or vomiting or diarrhea  Vascular: + LE edema improving    No Known Allergies      Hospital Medications: Medications reviewed      VITALS:  T(F): 97.8 (07-10-23 @ 05:35), Max: 99 (07-09-23 @ 22:05)  HR: 64 (07-10-23 @ 05:35)  BP: 131/89 (07-10-23 @ 05:35)  RR: 17 (07-10-23 @ 05:35)  SpO2: 100% (07-10-23 @ 05:35)  Wt(kg): --    07-09 @ 07:01  -  07-10 @ 07:00  --------------------------------------------------------  IN: 600 mL / OUT: 4285 mL / NET: -3685 mL    07-10 @ 07:01  -  07-10 @ 12:39  --------------------------------------------------------  IN: 300 mL / OUT: 0 mL / NET: 300 mL        PHYSICAL EXAM:    Gen: NAD, calm  Cards: RRR, +S1/S2, no M/G/R  Resp: CTA B/L  GI: soft, NT/ND, NABS  Vascular: 1+ LE edema B/L        LABS:  07-10    137  |  93<L>  |  56<H>  ----------------------------<  87  3.5   |  34<H>  |  1.75<H>    Ca    9.1      10 Jul 2023 05:50  Phos  3.8     07-10  Mg     2.10     07-10    TPro  6.0  /  Alb  2.8<L>  /  TBili  <0.2  /  DBili      /  AST  24  /  ALT  22  /  AlkPhos  75  07-10    Creatinine Trend: 1.75 <--, 1.52 <--, 1.49 <--, 1.60 <--                        12.8   6.39  )-----------( 264      ( 10 Jul 2023 05:50 )             40.3     Urine Studies:  Urinalysis Basic - ( 10 Jul 2023 05:50 )    Color:  / Appearance:  / SG:  / pH:   Gluc: 87 mg/dL / Ketone:   / Bili:  / Urobili:    Blood:  / Protein:  / Nitrite:    Leuk Esterase:  / RBC:  / WBC    Sq Epi:  / Non Sq Epi:  / Bacteria:       Sodium, Random Urine: 114 mmol/L (07-07 @ 19:59)  Potassium, Random Urine: 21.8 mmol/L (07-07 @ 19:59)  Chloride, Random Urine: 117 mmol/L (07-07 @ 19:59)  Creatinine, Random Urine: 20 mg/dL (07-07 @ 19:59)

## 2023-07-10 NOTE — PROGRESS NOTE ADULT - SUBJECTIVE AND OBJECTIVE BOX
PATIENT SEEN AND EXAMINED ON :- 7/10/23  DATE OF SERVICE:7/10/23             Interim events noted,Labs ,Radiological studies and Cardiology tests reviewed .  MR#[id number]  [facility]SE: HPI:  Patient is a 66yo M with PMH significant for anasarca who presented with complaint of worsening bilateral LE edema. This has been occurring for almost two years now, but worsened in the past few months. It is associated with weeping sores in bilateral lower extremities and some stinging pain. He denies changes in vision or hearing, dizziness, lightheadedness, chest pain, shortness of breath, palpitations, abdominal pain, N/V/D, dysuria, paresthesias, or numbness.    Patient reports being diagnosed with anasarca after a kidney biopsy but does not recall the specific disease he was being treated for. He follows with his PMD Dr Kashmir Crabtree and a nephrologist Dr Ritesh Bray. He has been taking prednisone for an extended duration, recently decreased to 20mg po daily. He has also been getting diuretics with furosemide, and recently was meant to take metolazone but could not receive it from the pharmacy yet.     His wife at bedside offered corroborating information. She stated he has been having decreased exercise capacity and has unintentionally lost 30lbs over the past year as well as muscle atrophy. The patient stated that he is still able to climb 2 flights of stairs regularly in his home and walk long distances without needing to stop.       Vital signs significant for: afebrile, normocardic, hypertensive to 145/98, RR 16-18, SpO2 100% on RA    Labs significant for: Hb 11.8, troponin 162 to 170, Cr 1.60 (no prior for comparison), proBNP 52747    Imaging significant for:   - CXR: chronic bilateral apical thickening   (07 Jul 2023 18:04)      INTERIM EVENTS:Patient seen at bedside ,interim events noted.      PMH -reviewed admission note, no change since admission  HEART FAILURE: Acute[ ]Chronic[ ] Systolic[ ] Diastolic[ ] Combined Systolic and Diastolic[ ]  CAD[ ] CABG[ ] PCI[ ]  DEVICES[ ] PPM[ ] ICD[ ] ILR[ ]  ATRIAL FIBRILLATION[ ] Paroxysmal[ ] Permanent[ ] CHADS2-[  ]  FRANDY[ ] CKD1[ ] CKD2[ ] CKD3[ ] CKD4[ ] ESRD[ ]  COPD[ ] HTN[ ]   DM[ ] Type1[ ] Type 2[ ]   CVA[ ] Paresis[ ]    AMBULATION: Assisted[ ] Cane/walker[ ] Independent[ ]    MEDICATIONS  (STANDING):  atorvastatin 20 milliGRAM(s) Oral at bedtime  calcium carbonate 1250 mG  + Vitamin D (OsCal 500 + D) 1 Tablet(s) Oral two times a day  enoxaparin Injectable 60 milliGRAM(s) SubCutaneous every 12 hours  pantoprazole    Tablet 40 milliGRAM(s) Oral before breakfast  potassium chloride    Tablet ER 20 milliEquivalent(s) Oral every 2 hours  predniSONE   Tablet 20 milliGRAM(s) Oral daily    MEDICATIONS  (PRN):  acetaminophen     Tablet .. 650 milliGRAM(s) Oral every 6 hours PRN Temp greater or equal to 38C (100.4F), Mild Pain (1 - 3)  aluminum hydroxide/magnesium hydroxide/simethicone Suspension 30 milliLiter(s) Oral every 4 hours PRN Dyspepsia  melatonin 3 milliGRAM(s) Oral at bedtime PRN Insomnia  ondansetron Injectable 4 milliGRAM(s) IV Push every 8 hours PRN Nausea and/or Vomiting            REVIEW OF SYSTEMS:  Constitutional: [ ] fever, [ ]weight loss,  [ ]fatigue [ ]weight gain  Eyes: [ ] visual changes  Respiratory: [ ]shortness of breath;  [ ] cough, [ ]wheezing, [ ]chills, [ ]hemoptysis  Cardiovascular: [ ] chest pain, [ ]palpitations, [ ]dizziness,  [ ]leg swelling[ ]orthopnea[ ]PND  Gastrointestinal: [ ] abdominal pain, [ ]nausea, [ ]vomiting,  [ ]diarrhea [ ]Constipation [ ]Melena  Genitourinary: [ ] dysuria, [ ] hematuria [ ]Osorio  Neurologic: [ ] headaches [ ] tremors[ ]weakness [ ]Paralysis Right[ ] Left[ ]  Skin: [ ] itching, [ ]burning, [ ] rashes  Endocrine: [ ] heat or cold intolerance  Musculoskeletal: [ ] joint pain or swelling; [ ] muscle, back, or extremity pain  Psychiatric: [ ] depression, [ ]anxiety, [ ]mood swings, or [ ]difficulty sleeping  Hematologic: [ ] easy bruising, [ ] bleeding gums    [ ] All remaining systems negative except as per above.   [ ]Unable to obtain.  [x] No change in ROS since admission      Vital Signs Last 24 Hrs  T(C): 36.7 (10 Jul 2023 17:01), Max: 36.7 (10 Jul 2023 12:35)  T(F): 98.1 (10 Jul 2023 17:01), Max: 98.1 (10 Jul 2023 12:35)  HR: 94 (10 Jul 2023 17:01) (64 - 94)  BP: 108/81 (10 Jul 2023 17:01) (108/81 - 131/89)  BP(mean): --  RR: 18 (10 Jul 2023 17:01) (17 - 18)  SpO2: 100% (10 Jul 2023 17:01) (100% - 100%)    Parameters below as of 10 Jul 2023 17:01  Patient On (Oxygen Delivery Method): room air      I&O's Summary    09 Jul 2023 07:01  -  10 Jul 2023 07:00  --------------------------------------------------------  IN: 600 mL / OUT: 4285 mL / NET: -3685 mL    10 Jul 2023 07:01  -  10 Jul 2023 22:32  --------------------------------------------------------  IN: 940 mL / OUT: 1450 mL / NET: -510 mL        PHYSICAL EXAM:  General: No acute distress BMI-  HEENT: EOMI, PERRL  Neck: Supple, [ ] JVD  Lungs: Equal air entry bilaterally; [ ] rales [ ] wheezing [ ] rhonchi  Heart: Regular rate and rhythm; [x ] murmur   2/6 [ x] systolic [ ] diastolic [ ] radiation[ ] rubs [ ]  gallops  Abdomen: Nontender, bowel sounds present  Extremities: No clubbing, cyanosis, [ ] edema [ ]Pulses  equal and intact  Nervous system:  Alert & Oriented X3, no focal deficits  Psychiatric: Normal affect  Skin: No rashes or lesions    LABS:  07-10    137  |  93<L>  |  56<H>  ----------------------------<  87  3.5   |  34<H>  |  1.75<H>    Ca    9.1      10 Jul 2023 05:50  Phos  3.8     07-10  Mg     2.10     07-10    TPro  6.0  /  Alb  2.8<L>  /  TBili  <0.2  /  DBili  x   /  AST  24  /  ALT  22  /  AlkPhos  75  07-10    Creatinine Trend: 1.75<--, 1.52<--, 1.49<--, 1.60<--                        12.8   6.39  )-----------( 264      ( 10 Jul 2023 05:50 )             40.3

## 2023-07-10 NOTE — PROGRESS NOTE ADULT - SUBJECTIVE AND OBJECTIVE BOX
STRANSKY, MARK  67y  Male      Patient is a 67y old  Male who presents with a chief complaint of b/l LE edema (10 Jul 2023 12:39)  feels fine.no new c/o    REVIEW OF SYSTEMS:  CONSTITUTIONAL: No fever  RESPIRATORY: No cough, hemoptysis or shortness of breath  CARDIOVASCULAR: No chest pain, palpitations, dizziness, or leg swelling  GASTROINTESTINAL: No abdominal pain. nausea, vomiting, hematemesis  GENITOURINARY: No dysuria, frequency, hematuria   NEUROLOGICAL: No headaches, no dizziness  MUSCULOSKELETAL: No joint pain or swelling;     INTERVAL HPI/OVERNIGHT EVENTS:  T(C): 36.7 (07-10-23 @ 17:01), Max: 37.2 (07-09-23 @ 22:05)  HR: 94 (07-10-23 @ 17:01) (64 - 94)  BP: 108/81 (07-10-23 @ 17:01) (108/81 - 131/89)  RR: 18 (07-10-23 @ 17:01) (17 - 18)  SpO2: 100% (07-10-23 @ 17:01) (100% - 100%)  Wt(kg): --  I&O's Summary    09 Jul 2023 07:01  -  10 Jul 2023 07:00  --------------------------------------------------------  IN: 600 mL / OUT: 4285 mL / NET: -3685 mL    10 Jul 2023 07:01  -  10 Jul 2023 21:46  --------------------------------------------------------  IN: 940 mL / OUT: 1450 mL / NET: -510 mL      T(C): 36.7 (07-10-23 @ 17:01), Max: 37.2 (07-09-23 @ 22:05)  HR: 94 (07-10-23 @ 17:01) (64 - 94)  BP: 108/81 (07-10-23 @ 17:01) (108/81 - 131/89)  RR: 18 (07-10-23 @ 17:01) (17 - 18)  SpO2: 100% (07-10-23 @ 17:01) (100% - 100%)  Wt(kg): --Vital Signs Last 24 Hrs  T(C): 36.7 (10 Jul 2023 17:01), Max: 37.2 (09 Jul 2023 22:05)  T(F): 98.1 (10 Jul 2023 17:01), Max: 99 (09 Jul 2023 22:05)  HR: 94 (10 Jul 2023 17:01) (64 - 94)  BP: 108/81 (10 Jul 2023 17:01) (108/81 - 131/89)  BP(mean): --  RR: 18 (10 Jul 2023 17:01) (17 - 18)  SpO2: 100% (10 Jul 2023 17:01) (100% - 100%)    Parameters below as of 10 Jul 2023 17:01  Patient On (Oxygen Delivery Method): room air        LABS:                        12.8   6.39  )-----------( 264      ( 10 Jul 2023 05:50 )             40.3     07-10    137  |  93<L>  |  56<H>  ----------------------------<  87  3.5   |  34<H>  |  1.75<H>    Ca    9.1      10 Jul 2023 05:50  Phos  3.8     07-10  Mg     2.10     07-10    TPro  6.0  /  Alb  2.8<L>  /  TBili  <0.2  /  DBili  x   /  AST  24  /  ALT  22  /  AlkPhos  75  07-10      Urinalysis Basic - ( 10 Jul 2023 05:50 )    Color: x / Appearance: x / SG: x / pH: x  Gluc: 87 mg/dL / Ketone: x  / Bili: x / Urobili: x   Blood: x / Protein: x / Nitrite: x   Leuk Esterase: x / RBC: x / WBC x   Sq Epi: x / Non Sq Epi: x / Bacteria: x      CAPILLARY BLOOD GLUCOSE            Urinalysis Basic - ( 10 Jul 2023 05:50 )    Color: x / Appearance: x / SG: x / pH: x  Gluc: 87 mg/dL / Ketone: x  / Bili: x / Urobili: x   Blood: x / Protein: x / Nitrite: x   Leuk Esterase: x / RBC: x / WBC x   Sq Epi: x / Non Sq Epi: x / Bacteria: x        PAST MEDICAL & SURGICAL HISTORY:  Anasarca      Status post right knee replacement          MEDICATIONS  (STANDING):  atorvastatin 20 milliGRAM(s) Oral at bedtime  calcium carbonate 1250 mG  + Vitamin D (OsCal 500 + D) 1 Tablet(s) Oral two times a day  enoxaparin Injectable 60 milliGRAM(s) SubCutaneous every 12 hours  pantoprazole    Tablet 40 milliGRAM(s) Oral before breakfast  potassium chloride    Tablet ER 20 milliEquivalent(s) Oral every 2 hours  predniSONE   Tablet 20 milliGRAM(s) Oral daily    MEDICATIONS  (PRN):  acetaminophen     Tablet .. 650 milliGRAM(s) Oral every 6 hours PRN Temp greater or equal to 38C (100.4F), Mild Pain (1 - 3)  aluminum hydroxide/magnesium hydroxide/simethicone Suspension 30 milliLiter(s) Oral every 4 hours PRN Dyspepsia  melatonin 3 milliGRAM(s) Oral at bedtime PRN Insomnia  ondansetron Injectable 4 milliGRAM(s) IV Push every 8 hours PRN Nausea and/or Vomiting        RADIOLOGY & ADDITIONAL TESTS:    Imaging Personally Reviewed:  [ ] YES  [ ] NO    Consultant(s) Notes Reviewed:  [ ] YES  [ ] NO    PHYSICAL EXAM:  GENERAL: Alert and awake lying in bed in no distress  HEAD:  Atraumatic, Normocephalic  EYES: EOMI, ELLIOT, conjunctiva and sclera clear  NECK: Supple, No JVD, Normal thyroid  NERVOUS SYSTEM:  Alert & Oriented X3, Motor and sensory systems are intact,   CHEST/LUNG: Bilateral clear breath sounds, no rhochi, no wheezing, no crepitations,  HEART: Regular rate and rhythm; No murmurs, rubs, or gallops  ABDOMEN: Soft, Nontender, Nondistended; Bowel sounds present  EXTREMITIES:   Peripheral Pulses are palpable,   edema+        Care Discussed with Consultants/Other Providers [ ] YES  [ ] NO      Code Status: [] Full Code [] DNR [] DNI [] Goals of Care:   Disposition: [] ICU [] Stroke Unit [] RCU []PCU []Floor [] Discharge Home         [email or web address]

## 2023-07-10 NOTE — PROGRESS NOTE ADULT - ASSESSMENT
Patient is a 68yo M with PMH significant for anasarca who presented with complaint of worsening bilateral LE edema.

## 2023-07-11 LAB
ANION GAP SERPL CALC-SCNC: 14 MMOL/L — SIGNIFICANT CHANGE UP (ref 7–14)
BASE EXCESS BLDV CALC-SCNC: 10.1 MMOL/L — HIGH (ref -2–3)
BUN SERPL-MCNC: 71 MG/DL — HIGH (ref 7–23)
CALCIUM SERPL-MCNC: 9.4 MG/DL — SIGNIFICANT CHANGE UP (ref 8.4–10.5)
CHLORIDE SERPL-SCNC: 95 MMOL/L — LOW (ref 98–107)
CO2 BLDV-SCNC: 37.1 MMOL/L — HIGH (ref 22–26)
CO2 SERPL-SCNC: 30 MMOL/L — SIGNIFICANT CHANGE UP (ref 22–31)
CREAT SERPL-MCNC: 1.79 MG/DL — HIGH (ref 0.5–1.3)
EGFR: 41 ML/MIN/1.73M2 — LOW
GLUCOSE SERPL-MCNC: 86 MG/DL — SIGNIFICANT CHANGE UP (ref 70–99)
HCO3 BLDV-SCNC: 36 MMOL/L — HIGH (ref 22–29)
HCT VFR BLD CALC: 39.2 % — SIGNIFICANT CHANGE UP (ref 39–50)
HGB BLD-MCNC: 12.3 G/DL — LOW (ref 13–17)
MAGNESIUM SERPL-MCNC: 2.4 MG/DL — SIGNIFICANT CHANGE UP (ref 1.6–2.6)
MCHC RBC-ENTMCNC: 29.7 PG — SIGNIFICANT CHANGE UP (ref 27–34)
MCHC RBC-ENTMCNC: 31.4 GM/DL — LOW (ref 32–36)
MCV RBC AUTO: 94.7 FL — SIGNIFICANT CHANGE UP (ref 80–100)
NRBC # BLD: 0 /100 WBCS — SIGNIFICANT CHANGE UP (ref 0–0)
NRBC # FLD: 0 K/UL — SIGNIFICANT CHANGE UP (ref 0–0)
PCO2 BLDV: 50 MMHG — SIGNIFICANT CHANGE UP (ref 42–55)
PH BLDV: 7.46 — HIGH (ref 7.32–7.43)
PHOSPHATE SERPL-MCNC: 3.8 MG/DL — SIGNIFICANT CHANGE UP (ref 2.5–4.5)
PLATELET # BLD AUTO: 244 K/UL — SIGNIFICANT CHANGE UP (ref 150–400)
PO2 BLDV: 55 MMHG — HIGH (ref 25–45)
POTASSIUM SERPL-MCNC: 3.7 MMOL/L — SIGNIFICANT CHANGE UP (ref 3.5–5.3)
POTASSIUM SERPL-SCNC: 3.7 MMOL/L — SIGNIFICANT CHANGE UP (ref 3.5–5.3)
RBC # BLD: 4.14 M/UL — LOW (ref 4.2–5.8)
RBC # FLD: 16.1 % — HIGH (ref 10.3–14.5)
SAO2 % BLDV: 87 % — SIGNIFICANT CHANGE UP (ref 67–88)
SODIUM SERPL-SCNC: 139 MMOL/L — SIGNIFICANT CHANGE UP (ref 135–145)
WBC # BLD: 6.28 K/UL — SIGNIFICANT CHANGE UP (ref 3.8–10.5)
WBC # FLD AUTO: 6.28 K/UL — SIGNIFICANT CHANGE UP (ref 3.8–10.5)

## 2023-07-11 RX ADMIN — ENOXAPARIN SODIUM 60 MILLIGRAM(S): 100 INJECTION SUBCUTANEOUS at 17:22

## 2023-07-11 RX ADMIN — Medication 1 TABLET(S): at 17:21

## 2023-07-11 RX ADMIN — PANTOPRAZOLE SODIUM 40 MILLIGRAM(S): 20 TABLET, DELAYED RELEASE ORAL at 06:25

## 2023-07-11 RX ADMIN — Medication 1 TABLET(S): at 06:25

## 2023-07-11 RX ADMIN — ATORVASTATIN CALCIUM 20 MILLIGRAM(S): 80 TABLET, FILM COATED ORAL at 21:37

## 2023-07-11 RX ADMIN — Medication 20 MILLIEQUIVALENT(S): at 00:31

## 2023-07-11 RX ADMIN — Medication 20 MILLIGRAM(S): at 06:24

## 2023-07-11 RX ADMIN — ENOXAPARIN SODIUM 60 MILLIGRAM(S): 100 INJECTION SUBCUTANEOUS at 06:25

## 2023-07-11 NOTE — PROGRESS NOTE ADULT - SUBJECTIVE AND OBJECTIVE BOX
PATIENT SEEN AND EXAMINED ON :-7/11/23  DATE OF SERVICE:    7/11/23         Interim events noted,Labs ,Radiological studies and Cardiology tests reviewed .    MR#[id number]  [facility] HPI:  Patient is a 68yo M with PMH significant for anasarca who presented with complaint of worsening bilateral LE edema. This has been occurring for almost two years now, but worsened in the past few months. It is associated with weeping sores in bilateral lower extremities and some stinging pain. He denies changes in vision or hearing, dizziness, lightheadedness, chest pain, shortness of breath, palpitations, abdominal pain, N/V/D, dysuria, paresthesias, or numbness.    Patient reports being diagnosed with anasarca after a kidney biopsy but does not recall the specific disease he was being treated for. He follows with his PMD Dr Kashmir Crabtree and a nephrologist Dr Ritesh Bray. He has been taking prednisone for an extended duration, recently decreased to 20mg po daily. He has also been getting diuretics with furosemide, and recently was meant to take metolazone but could not receive it from the pharmacy yet.     His wife at bedside offered corroborating information. She stated he has been having decreased exercise capacity and has unintentionally lost 30lbs over the past year as well as muscle atrophy. The patient stated that he is still able to climb 2 flights of stairs regularly in his home and walk long distances without needing to stop.       Vital signs significant for: afebrile, normocardic, hypertensive to 145/98, RR 16-18, SpO2 100% on RA    Labs significant for: Hb 11.8, troponin 162 to 170, Cr 1.60 (no prior for comparison), proBNP 31200    Imaging significant for:   - CXR: chronic bilateral apical thickening   (07 Jul 2023 18:04)      INTERIM EVENTS:Patient seen at bedside ,interim events noted.      PMH -reviewed admission note, no change since admission  HEART FAILURE: Acute[ ]Chronic[ ] Systolic[ ] Diastolic[ ] Combined Systolic and Diastolic[ ]  CAD[ ] CABG[ ] PCI[ ]  DEVICES[ ] PPM[ ] ICD[ ] ILR[ ]  ATRIAL FIBRILLATION[ ] Paroxysmal[ ] Permanent[ ] CHADS2-[  ]  FRANDY[ ] CKD1[ ] CKD2[ ] CKD3[ ] CKD4[ ] ESRD[ ]  COPD[ ] HTN[ ]   DM[ ] Type1[ ] Type 2[ ]   CVA[ ] Paresis[ ]    AMBULATION: Assisted[ ] Cane/walker[ ] Independent[ ]    MEDICATIONS  (STANDING):  atorvastatin 20 milliGRAM(s) Oral at bedtime  calcium carbonate 1250 mG  + Vitamin D (OsCal 500 + D) 1 Tablet(s) Oral two times a day  enoxaparin Injectable 60 milliGRAM(s) SubCutaneous every 12 hours  pantoprazole    Tablet 40 milliGRAM(s) Oral before breakfast    MEDICATIONS  (PRN):  acetaminophen     Tablet .. 650 milliGRAM(s) Oral every 6 hours PRN Temp greater or equal to 38C (100.4F), Mild Pain (1 - 3)  aluminum hydroxide/magnesium hydroxide/simethicone Suspension 30 milliLiter(s) Oral every 4 hours PRN Dyspepsia  melatonin 3 milliGRAM(s) Oral at bedtime PRN Insomnia  ondansetron Injectable 4 milliGRAM(s) IV Push every 8 hours PRN Nausea and/or Vomiting            REVIEW OF SYSTEMS:  Constitutional: [ ] fever, [ ]weight loss,  [ ]fatigue [ ]weight gain  Eyes: [ ] visual changes  Respiratory: [ ]shortness of breath;  [ ] cough, [ ]wheezing, [ ]chills, [ ]hemoptysis  Cardiovascular: [ ] chest pain, [ ]palpitations, [ ]dizziness,  [ ]leg swelling[ ]orthopnea[ ]PND  Gastrointestinal: [ ] abdominal pain, [ ]nausea, [ ]vomiting,  [ ]diarrhea [ ]Constipation [ ]Melena  Genitourinary: [ ] dysuria, [ ] hematuria [ ]Osorio  Neurologic: [ ] headaches [ ] tremors[ ]weakness [ ]Paralysis Right[ ] Left[ ]  Skin: [ ] itching, [ ]burning, [ ] rashes  Endocrine: [ ] heat or cold intolerance  Musculoskeletal: [ ] joint pain or swelling; [ ] muscle, back, or extremity pain  Psychiatric: [ ] depression, [ ]anxiety, [ ]mood swings, or [ ]difficulty sleeping  Hematologic: [ ] easy bruising, [ ] bleeding gums    [ ] All remaining systems negative except as per above.   [ ]Unable to obtain.  [x] No change in ROS since admission      Vital Signs Last 24 Hrs  T(C): 36.8 (11 Jul 2023 17:22), Max: 37.1 (11 Jul 2023 13:54)  T(F): 98.2 (11 Jul 2023 17:22), Max: 98.8 (11 Jul 2023 13:54)  HR: 76 (11 Jul 2023 17:22) (65 - 76)  BP: 138/85 (11 Jul 2023 17:22) (133/84 - 138/85)  BP(mean): --  RR: 18 (11 Jul 2023 17:22) (18 - 18)  SpO2: 100% (11 Jul 2023 17:22) (100% - 100%)    Parameters below as of 11 Jul 2023 17:22  Patient On (Oxygen Delivery Method): room air      I&O's Summary    10 Jul 2023 07:01  -  11 Jul 2023 07:00  --------------------------------------------------------  IN: 940 mL / OUT: 1450 mL / NET: -510 mL    11 Jul 2023 07:01  -  11 Jul 2023 23:12  --------------------------------------------------------  IN: 680 mL / OUT: 300 mL / NET: 380 mL        PHYSICAL EXAM:  General: No acute distress BMI-  HEENT: EOMI, PERRL  Neck: Supple, [ ] JVD  Lungs: Equal air entry bilaterally; [ ] rales [ ] wheezing [ ] rhonchi  Heart: Regular rate and rhythm; [x ] murmur   2/6 [ x] systolic [ ] diastolic [ ] radiation[ ] rubs [ ]  gallops  Abdomen: Nontender, bowel sounds present  Extremities: No clubbing, cyanosis, [ ] edema [ ]Pulses  equal and intact  Nervous system:  Alert & Oriented X3, no focal deficits  Psychiatric: Normal affect  Skin: No rashes or lesions    LABS:  07-11    139  |  95<L>  |  71<H>  ----------------------------<  86  3.7   |  30  |  1.79<H>    Ca    9.4      11 Jul 2023 04:20  Phos  3.8     07-11  Mg     2.40     07-11    TPro  6.0  /  Alb  2.8<L>  /  TBili  <0.2  /  DBili  x   /  AST  24  /  ALT  22  /  AlkPhos  75  07-10    Creatinine Trend: 1.79<--, 1.75<--, 1.52<--, 1.49<--, 1.60<--                        12.3   6.28  )-----------( 244      ( 11 Jul 2023 04:20 )             39.2       ------------------------------------------------------------------------  CONCLUSIONS:  1. Mitral annular calcification, otherwise normal mitral  valve. Mild mitral regurgitation.  2. Mildly dilated left atrium.  LA volume index = 35 cc/m2.  3. Normal left ventricular internal dimensions and wall  thicknesses.  4. Severe global left ventricular systolic dysfunction.  5. Normal right ventricular size and function.  *** No previous Echo exam.

## 2023-07-11 NOTE — PROGRESS NOTE ADULT - SUBJECTIVE AND OBJECTIVE BOX
Full note to follow. [facility] NEPHROLOGY- PROGRESS NOTE    67y Male with history of nephrotic syndrome presents with volume overload. Nephrology consulted for elevated Scr.    kidney biopsy 2.5 years ago showed minimal change disease.  Pt has been on intermittent steroids, though he was lost to f/u for the last six months.    REVIEW OF SYSTEMS:  Gen: no fevers  Cards: no chest pain  Resp: no dyspnea  GI: no nausea or vomiting or diarrhea  Vascular: + LE edema improving    No Known Allergies      Hospital Medications: Medications reviewed      VITALS:  T(F): 98.2 (07-11-23 @ 17:22), Max: 98.8 (07-11-23 @ 13:54)  HR: 76 (07-11-23 @ 17:22)  BP: 138/85 (07-11-23 @ 17:22)  RR: 18 (07-11-23 @ 17:22)  SpO2: 100% (07-11-23 @ 17:22)  Wt(kg): --    07-10 @ 07:01  -  07-11 @ 07:00  --------------------------------------------------------  IN: 940 mL / OUT: 1450 mL / NET: -510 mL    07-11 @ 07:01  -  07-11 @ 23:00  --------------------------------------------------------  IN: 680 mL / OUT: 300 mL / NET: 380 mL        PHYSICAL EXAM:  Gen: NAD, calm  Cards: RRR, +S1/S2, no M/G/R  Resp: CTA B/L  GI: soft, NT/ND, NABS  Vascular: 1+ LE edema B/L- seems improved        LABS:  07-11    139  |  95<L>  |  71<H>  ----------------------------<  86  3.7   |  30  |  1.79<H>    Ca    9.4      11 Jul 2023 04:20  Phos  3.8     07-11  Mg     2.40     07-11    TPro  6.0  /  Alb  2.8<L>  /  TBili  <0.2  /  DBili      /  AST  24  /  ALT  22  /  AlkPhos  75  07-10    Creatinine Trend: 1.79 <--, 1.75 <--, 1.52 <--, 1.49 <--, 1.60 <--                        12.3   6.28  )-----------( 244      ( 11 Jul 2023 04:20 )             39.2     Urine Studies:  Urinalysis Basic - ( 11 Jul 2023 04:20 )    Color:  / Appearance:  / SG:  / pH:   Gluc: 86 mg/dL / Ketone:   / Bili:  / Urobili:    Blood:  / Protein:  / Nitrite:    Leuk Esterase:  / RBC:  / WBC    Sq Epi:  / Non Sq Epi:  / Bacteria:       Sodium, Random Urine: 114 mmol/L (07-07 @ 19:59)  Potassium, Random Urine: 21.8 mmol/L (07-07 @ 19:59)  Chloride, Random Urine: 117 mmol/L (07-07 @ 19:59)  Creatinine, Random Urine: 20 mg/dL (07-07 @ 19:59)    Blood Gas Profile - Venous (07.11.23 @ 04:20)    pH, Venous: 7.46: Due to specimen delivery delays, please interpret with caution   pCO2, Venous: 50 mmHg   pO2, Venous: 55 mmHg   HCO3, Venous: 36 mmol/L   Base Excess, Venous: 10.1 mmol/L   Oxygen Saturation, Venous: 87.0 %   Total CO2, Venous: 37.1 mmol/L

## 2023-07-11 NOTE — PROGRESS NOTE ADULT - SUBJECTIVE AND OBJECTIVE BOX
STRANSKY, MARK  67y  Male      Patient is a 67y old  Male who presents with a chief complaint of b/l LE edema (11 Jul 2023 17:41)  comfortable,nad.no sob,no cp    REVIEW OF SYSTEMS:  CONSTITUTIONAL: No fever  RESPIRATORY: No cough, hemoptysis or shortness of breath  CARDIOVASCULAR: No chest pain, palpitations, dizziness, or leg swelling  GASTROINTESTINAL: No abdominal pain. nausea, vomiting, hematemesis  GENITOURINARY: No dysuria, frequency, hematuria   NEUROLOGICAL: No headaches, no dizziness  MUSCULOSKELETAL: No joint pain or swelling;     INTERVAL HPI/OVERNIGHT EVENTS:  T(C): 36.8 (07-11-23 @ 17:22), Max: 37.1 (07-11-23 @ 13:54)  HR: 76 (07-11-23 @ 17:22) (65 - 76)  BP: 138/85 (07-11-23 @ 17:22) (133/84 - 138/85)  RR: 18 (07-11-23 @ 17:22) (18 - 18)  SpO2: 100% (07-11-23 @ 17:22) (100% - 100%)  Wt(kg): --  I&O's Summary    10 Jul 2023 07:01  -  11 Jul 2023 07:00  --------------------------------------------------------  IN: 940 mL / OUT: 1450 mL / NET: -510 mL    11 Jul 2023 07:01  -  11 Jul 2023 23:01  --------------------------------------------------------  IN: 680 mL / OUT: 300 mL / NET: 380 mL      T(C): 36.8 (07-11-23 @ 17:22), Max: 37.1 (07-11-23 @ 13:54)  HR: 76 (07-11-23 @ 17:22) (65 - 76)  BP: 138/85 (07-11-23 @ 17:22) (133/84 - 138/85)  RR: 18 (07-11-23 @ 17:22) (18 - 18)  SpO2: 100% (07-11-23 @ 17:22) (100% - 100%)  Wt(kg): --Vital Signs Last 24 Hrs  T(C): 36.8 (11 Jul 2023 17:22), Max: 37.1 (11 Jul 2023 13:54)  T(F): 98.2 (11 Jul 2023 17:22), Max: 98.8 (11 Jul 2023 13:54)  HR: 76 (11 Jul 2023 17:22) (65 - 76)  BP: 138/85 (11 Jul 2023 17:22) (133/84 - 138/85)  BP(mean): --  RR: 18 (11 Jul 2023 17:22) (18 - 18)  SpO2: 100% (11 Jul 2023 17:22) (100% - 100%)    Parameters below as of 11 Jul 2023 17:22  Patient On (Oxygen Delivery Method): room air        LABS:                        12.3   6.28  )-----------( 244      ( 11 Jul 2023 04:20 )             39.2     07-11    139  |  95<L>  |  71<H>  ----------------------------<  86  3.7   |  30  |  1.79<H>    Ca    9.4      11 Jul 2023 04:20  Phos  3.8     07-11  Mg     2.40     07-11    TPro  6.0  /  Alb  2.8<L>  /  TBili  <0.2  /  DBili  x   /  AST  24  /  ALT  22  /  AlkPhos  75  07-10      Urinalysis Basic - ( 11 Jul 2023 04:20 )    Color: x / Appearance: x / SG: x / pH: x  Gluc: 86 mg/dL / Ketone: x  / Bili: x / Urobili: x   Blood: x / Protein: x / Nitrite: x   Leuk Esterase: x / RBC: x / WBC x   Sq Epi: x / Non Sq Epi: x / Bacteria: x      CAPILLARY BLOOD GLUCOSE            Urinalysis Basic - ( 11 Jul 2023 04:20 )    Color: x / Appearance: x / SG: x / pH: x  Gluc: 86 mg/dL / Ketone: x  / Bili: x / Urobili: x   Blood: x / Protein: x / Nitrite: x   Leuk Esterase: x / RBC: x / WBC x   Sq Epi: x / Non Sq Epi: x / Bacteria: x        PAST MEDICAL & SURGICAL HISTORY:  Anasarca      Status post right knee replacement          MEDICATIONS  (STANDING):  atorvastatin 20 milliGRAM(s) Oral at bedtime  calcium carbonate 1250 mG  + Vitamin D (OsCal 500 + D) 1 Tablet(s) Oral two times a day  enoxaparin Injectable 60 milliGRAM(s) SubCutaneous every 12 hours  pantoprazole    Tablet 40 milliGRAM(s) Oral before breakfast  predniSONE   Tablet 20 milliGRAM(s) Oral daily    MEDICATIONS  (PRN):  acetaminophen     Tablet .. 650 milliGRAM(s) Oral every 6 hours PRN Temp greater or equal to 38C (100.4F), Mild Pain (1 - 3)  aluminum hydroxide/magnesium hydroxide/simethicone Suspension 30 milliLiter(s) Oral every 4 hours PRN Dyspepsia  melatonin 3 milliGRAM(s) Oral at bedtime PRN Insomnia  ondansetron Injectable 4 milliGRAM(s) IV Push every 8 hours PRN Nausea and/or Vomiting        RADIOLOGY & ADDITIONAL TESTS:    Imaging Personally Reviewed:  [ ] YES  [ ] NO    Consultant(s) Notes Reviewed:  [x ] YES  [ ] NO    PHYSICAL EXAM:  GENERAL: Alert and awake lying in bed in no distress  HEAD:  Atraumatic, Normocephalic  EYES: EOMI, ELLIOT, conjunctiva and sclera clear  NECK: Supple, No JVD, Normal thyroid  NERVOUS SYSTEM:  Alert & Oriented X3, Motor and sensory systems are intact,   CHEST/LUNG: Bilateral clear breath sounds, no rhochi, no wheezing, no crepitations,  HEART: Regular rate and rhythm; No murmurs, rubs, or gallops  ABDOMEN: Soft, Nontender, Nondistended; Bowel sounds present  EXTREMITIES:   Peripheral Pulses are palpable, no  edema        Care Discussed with Consultants/Other Providers [ x] YES  [ ] NO      Code Status: [] Full Code [] DNR [] DNI [] Goals of Care:   Disposition: [] ICU [] Stroke Unit [] RCU []PCU []Floor [] Discharge Home         REKHA [email or web address]

## 2023-07-11 NOTE — PROGRESS NOTE ADULT - ASSESSMENT
67y Male with history of nephrotic syndrome presents with volume overload. Nephrology consulted for elevated Scr.    1) Nephrotic syndrome: S/P kidney biopsy 2.5 years ago showed minimal change disease.  Pt has been on intermittent steroids, though he was lost to f/u for the last six months. Significant proteinuria (spot urine TP/CR 9.7).    Started statin given hyperlipidemia. Given hypoalbuminemia, would continue with full dose AC at this time for ppx against thrombotic events from nephrotic syndrome but ideally switch to eliquis (patient also diagnosed with DVT). Increase prednisone to 60 mg PO daily with concurrent calcium plus vitamin D and PPI as patient on chronic steroids.    2) CKD-3a: Baseline Scr unknown but suspect patient with FRANDY due to renal vein congestion versus CKD-3a. Renal US without hydronephrosis and renal vein dopplers negative for RVT. UA with microscopic hematuria and nephrotic range proteinuria due to underlying glomerular disease. Defer further work up as patient follows with an outpatient nephrologist. Avoid nephrotoxins. Monitor electrolytes.    3) HTN with CKD: BP controlled. Plan to start ACE-I/ARB and SGLT2 inhibitor prior to discharge. Monitor BP.    4) Anasarca: Improving. D/C'ed bumex gtt given contraction alkalosis. s/p diamox 500 mg IV X 1 dose 7/10.  Monitor off diuretics for now with plan to restart oral loop diuretics upon discharge.  TTE with severe global LVSD. Monitor UO.      [facility] NEPHROLOGY  Carl Schmidt M.D.  Leroy Quintero D.O.  Zuleyka Sherman M.D.  Shayy Garcia, MSN, ANP-C    Telephone: [phone]  Facsimile: [phone]    [address] [place] [address]

## 2023-07-12 LAB
ANION GAP SERPL CALC-SCNC: 12 MMOL/L — SIGNIFICANT CHANGE UP (ref 7–14)
BUN SERPL-MCNC: 65 MG/DL — HIGH (ref 7–23)
CALCIUM SERPL-MCNC: 8.8 MG/DL — SIGNIFICANT CHANGE UP (ref 8.4–10.5)
CHLORIDE SERPL-SCNC: 98 MMOL/L — SIGNIFICANT CHANGE UP (ref 98–107)
CO2 SERPL-SCNC: 28 MMOL/L — SIGNIFICANT CHANGE UP (ref 22–31)
CREAT SERPL-MCNC: 1.72 MG/DL — HIGH (ref 0.5–1.3)
EGFR: 43 ML/MIN/1.73M2 — LOW
GLUCOSE SERPL-MCNC: 84 MG/DL — SIGNIFICANT CHANGE UP (ref 70–99)
HCT VFR BLD CALC: 36.2 % — LOW (ref 39–50)
HGB BLD-MCNC: 11.6 G/DL — LOW (ref 13–17)
MAGNESIUM SERPL-MCNC: 2.4 MG/DL — SIGNIFICANT CHANGE UP (ref 1.6–2.6)
MCHC RBC-ENTMCNC: 29.5 PG — SIGNIFICANT CHANGE UP (ref 27–34)
MCHC RBC-ENTMCNC: 32 GM/DL — SIGNIFICANT CHANGE UP (ref 32–36)
MCV RBC AUTO: 92.1 FL — SIGNIFICANT CHANGE UP (ref 80–100)
NRBC # BLD: 0 /100 WBCS — SIGNIFICANT CHANGE UP (ref 0–0)
NRBC # FLD: 0 K/UL — SIGNIFICANT CHANGE UP (ref 0–0)
PHOSPHATE SERPL-MCNC: 3.2 MG/DL — SIGNIFICANT CHANGE UP (ref 2.5–4.5)
PLATELET # BLD AUTO: 269 K/UL — SIGNIFICANT CHANGE UP (ref 150–400)
POTASSIUM SERPL-MCNC: 3.4 MMOL/L — LOW (ref 3.5–5.3)
POTASSIUM SERPL-SCNC: 3.4 MMOL/L — LOW (ref 3.5–5.3)
RBC # BLD: 3.93 M/UL — LOW (ref 4.2–5.8)
RBC # FLD: 15.9 % — HIGH (ref 10.3–14.5)
SODIUM SERPL-SCNC: 138 MMOL/L — SIGNIFICANT CHANGE UP (ref 135–145)
WBC # BLD: 5.33 K/UL — SIGNIFICANT CHANGE UP (ref 3.8–10.5)
WBC # FLD AUTO: 5.33 K/UL — SIGNIFICANT CHANGE UP (ref 3.8–10.5)

## 2023-07-12 RX ORDER — SODIUM CHLORIDE 9 MG/ML
1000 INJECTION INTRAMUSCULAR; INTRAVENOUS; SUBCUTANEOUS
Refills: 0 | Status: DISCONTINUED | OUTPATIENT
Start: 2023-07-12 | End: 2023-07-12

## 2023-07-12 RX ORDER — SODIUM CHLORIDE 9 MG/ML
1000 INJECTION INTRAMUSCULAR; INTRAVENOUS; SUBCUTANEOUS
Refills: 0 | Status: DISCONTINUED | OUTPATIENT
Start: 2023-07-13 | End: 2023-07-14

## 2023-07-12 RX ORDER — POTASSIUM CHLORIDE 20 MEQ
40 PACKET (EA) ORAL ONCE
Refills: 0 | Status: COMPLETED | OUTPATIENT
Start: 2023-07-12 | End: 2023-07-12

## 2023-07-12 RX ADMIN — SODIUM CHLORIDE 70 MILLILITER(S): 9 INJECTION INTRAMUSCULAR; INTRAVENOUS; SUBCUTANEOUS at 12:46

## 2023-07-12 RX ADMIN — Medication 60 MILLIGRAM(S): at 06:28

## 2023-07-12 RX ADMIN — PANTOPRAZOLE SODIUM 40 MILLIGRAM(S): 20 TABLET, DELAYED RELEASE ORAL at 06:27

## 2023-07-12 RX ADMIN — ENOXAPARIN SODIUM 60 MILLIGRAM(S): 100 INJECTION SUBCUTANEOUS at 11:23

## 2023-07-12 RX ADMIN — Medication 1 TABLET(S): at 17:29

## 2023-07-12 RX ADMIN — Medication 40 MILLIEQUIVALENT(S): at 12:46

## 2023-07-12 RX ADMIN — Medication 1 TABLET(S): at 06:28

## 2023-07-12 NOTE — PROGRESS NOTE ADULT - SUBJECTIVE AND OBJECTIVE BOX
[facility] NEPHROLOGY- PROGRESS NOTE    67y Male with history of nephrotic syndrome presents with volume overload. Nephrology consulted for elevated Scr.    REVIEW OF SYSTEMS:  Gen: no fevers  Cards: no chest pain  Resp: no dyspnea  GI: no nausea or vomiting or diarrhea  Vascular: + LE edema resolved    No Known Allergies      Hospital Medications: Medications reviewed      VITALS:  T(F): 97.9 (07-12-23 @ 07:25), Max: 99.2 (07-11-23 @ 21:30)  HR: 90 (07-12-23 @ 07:25)  BP: 122/80 (07-12-23 @ 07:25)  RR: 18 (07-12-23 @ 07:25)  SpO2: 100% (07-12-23 @ 07:25)  Wt(kg): --    07-11 @ 07:01  -  07-12 @ 07:00  --------------------------------------------------------  IN: 680 mL / OUT: 1500 mL / NET: -820 mL    07-12 @ 07:01  -  07-12 @ 12:13  --------------------------------------------------------  IN: 120 mL / OUT: 0 mL / NET: 120 mL        PHYSICAL EXAM:    Gen: NAD, calm  Cards: RRR, +S1/S2, no M/G/R  Resp: CTA B/L  GI: soft, NT/ND, NABS  Vascular: trace LE edema B/L        LABS:  07-12    138  |  98  |  65<H>  ----------------------------<  84  3.4<L>   |  28  |  1.72<H>    Ca    8.8      12 Jul 2023 04:45  Phos  3.2     07-12  Mg     2.40     07-12      Creatinine Trend: 1.72 <--, 1.79 <--, 1.75 <--, 1.52 <--, 1.49 <--, 1.60 <--                        11.6   5.33  )-----------( 269      ( 12 Jul 2023 04:45 )             36.2     Urine Studies:  Urinalysis Basic - ( 12 Jul 2023 04:45 )    Color:  / Appearance:  / SG:  / pH:   Gluc: 84 mg/dL / Ketone:   / Bili:  / Urobili:    Blood:  / Protein:  / Nitrite:    Leuk Esterase:  / RBC:  / WBC    Sq Epi:  / Non Sq Epi:  / Bacteria:       Sodium, Random Urine: 114 mmol/L (07-07 @ 19:59)  Potassium, Random Urine: 21.8 mmol/L (07-07 @ 19:59)  Chloride, Random Urine: 117 mmol/L (07-07 @ 19:59)  Creatinine, Random Urine: 20 mg/dL (07-07 @ 19:59)

## 2023-07-12 NOTE — PROGRESS NOTE ADULT - SUBJECTIVE AND OBJECTIVE BOX
PATIENT SEEN AND EXAMINED ON :- 7/12/23  DATE OF SERVICE:     6/12/23        Interim events noted,Labs ,Radiological studies and Cardiology tests reviewed .    MR#[id number]  [facility] COURSE: HPI:  Patient is a 68yo M with PMH significant for anasarca who presented with complaint of worsening bilateral LE edema. This has been occurring for almost two years now, but worsened in the past few months. It is associated with weeping sores in bilateral lower extremities and some stinging pain. He denies changes in vision or hearing, dizziness, lightheadedness, chest pain, shortness of breath, palpitations, abdominal pain, N/V/D, dysuria, paresthesias, or numbness.    Patient reports being diagnosed with anasarca after a kidney biopsy but does not recall the specific disease he was being treated for. He follows with his PMD Dr Kashmir Crabtree and a nephrologist Dr Ritesh Bray. He has been taking prednisone for an extended duration, recently decreased to 20mg po daily. He has also been getting diuretics with furosemide, and recently was meant to take metolazone but could not receive it from the pharmacy yet.     His wife at bedside offered corroborating information. She stated he has been having decreased exercise capacity and has unintentionally lost 30lbs over the past year as well as muscle atrophy. The patient stated that he is still able to climb 2 flights of stairs regularly in his home and walk long distances without needing to stop.       Vital signs significant for: afebrile, normocardic, hypertensive to 145/98, RR 16-18, SpO2 100% on RA    Labs significant for: Hb 11.8, troponin 162 to 170, Cr 1.60 (no prior for comparison), proBNP 61290    Imaging significant for:   - CXR: chronic bilateral apical thickening   (07 Jul 2023 18:04)      INTERIM EVENTS:Patient seen at bedside ,interim events noted.      PMH -reviewed admission note, no change since admission  HEART FAILURE: Acute[ ]Chronic[ ] Systolic[ ] Diastolic[ ] Combined Systolic and Diastolic[ ]  CAD[ ] CABG[ ] PCI[ ]  DEVICES[ ] PPM[ ] ICD[ ] ILR[ ]  ATRIAL FIBRILLATION[ ] Paroxysmal[ ] Permanent[ ] CHADS2-[  ]  FRANDY[ ] CKD1[ ] CKD2[ ] CKD3[ ] CKD4[ ] ESRD[ ]  COPD[ ] HTN[ ]   DM[ ] Type1[ ] Type 2[ ]   CVA[ ] Paresis[ ]    AMBULATION: Assisted[ ] Cane/walker[ ] Independent[ ]    MEDICATIONS  (STANDING):  atorvastatin 20 milliGRAM(s) Oral at bedtime  calcium carbonate 1250 mG  + Vitamin D (OsCal 500 + D) 1 Tablet(s) Oral two times a day  pantoprazole    Tablet 40 milliGRAM(s) Oral before breakfast    MEDICATIONS  (PRN):  acetaminophen     Tablet .. 650 milliGRAM(s) Oral every 6 hours PRN Temp greater or equal to 38C (100.4F), Mild Pain (1 - 3)  aluminum hydroxide/magnesium hydroxide/simethicone Suspension 30 milliLiter(s) Oral every 4 hours PRN Dyspepsia  melatonin 3 milliGRAM(s) Oral at bedtime PRN Insomnia  ondansetron Injectable 4 milliGRAM(s) IV Push every 8 hours PRN Nausea and/or Vomiting            REVIEW OF SYSTEMS:  Constitutional: [ ] fever, [ ]weight loss,  [ ]fatigue [ ]weight gain  Eyes: [ ] visual changes  Respiratory: [ ]shortness of breath;  [ ] cough, [ ]wheezing, [ ]chills, [ ]hemoptysis  Cardiovascular: [ ] chest pain, [ ]palpitations, [ ]dizziness,  [ ]leg swelling[ ]orthopnea[ ]PND  Gastrointestinal: [ ] abdominal pain, [ ]nausea, [ ]vomiting,  [ ]diarrhea [ ]Constipation [ ]Melena  Genitourinary: [ ] dysuria, [ ] hematuria [ ]Osorio  Neurologic: [ ] headaches [ ] tremors[ ]weakness [ ]Paralysis Right[ ] Left[ ]  Skin: [ ] itching, [ ]burning, [ ] rashes  Endocrine: [ ] heat or cold intolerance  Musculoskeletal: [ ] joint pain or swelling; [ ] muscle, back, or extremity pain  Psychiatric: [ ] depression, [ ]anxiety, [ ]mood swings, or [ ]difficulty sleeping  Hematologic: [ ] easy bruising, [ ] bleeding gums    [ ] All remaining systems negative except as per above.   [ ]Unable to obtain.  [x] No change in ROS since admission      Vital Signs Last 24 Hrs  T(C): 36.9 (12 Jul 2023 22:00), Max: 36.9 (12 Jul 2023 12:10)  T(F): 98.5 (12 Jul 2023 22:00), Max: 98.5 (12 Jul 2023 12:10)  HR: 75 (12 Jul 2023 22:00) (75 - 90)  BP: 131/88 (12 Jul 2023 22:00) (122/80 - 137/92)  BP(mean): --  RR: 18 (12 Jul 2023 22:00) (18 - 18)  SpO2: 100% (12 Jul 2023 22:00) (100% - 100%)    Parameters below as of 12 Jul 2023 22:00  Patient On (Oxygen Delivery Method): room air      I&O's Summary    11 Jul 2023 07:01  -  12 Jul 2023 07:00  --------------------------------------------------------  IN: 680 mL / OUT: 1500 mL / NET: -820 mL    12 Jul 2023 07:01  -  13 Jul 2023 00:01  --------------------------------------------------------  IN: 360 mL / OUT: 0 mL / NET: 360 mL        PHYSICAL EXAM:  General: No acute distress BMI-  HEENT: EOMI, PERRL  Neck: Supple, [ ] JVD  Lungs: Equal air entry bilaterally; [ ] rales [ ] wheezing [ ] rhonchi  Heart: Regular rate and rhythm; [x ] murmur   2/6 [ x] systolic [ ] diastolic [ ] radiation[ ] rubs [ ]  gallops  Abdomen: Nontender, bowel sounds present  Extremities: No clubbing, cyanosis, [ ] edema [ ]Pulses  equal and intact  Nervous system:  Alert & Oriented X3, no focal deficits  Psychiatric: Normal affect  Skin: No rashes or lesions    LABS:  07-12    138  |  98  |  65<H>  ----------------------------<  84  3.4<L>   |  28  |  1.72<H>    Ca    8.8      12 Jul 2023 04:45  Phos  3.2     07-12  Mg     2.40     07-12      Creatinine Trend: 1.72<--, 1.79<--, 1.75<--, 1.52<--, 1.49<--, 1.60<--                        11.6   5.33  )-----------( 269      ( 12 Jul 2023 04:45 )             36.2

## 2023-07-12 NOTE — PROGRESS NOTE ADULT - SUBJECTIVE AND OBJECTIVE BOX
STRANSKY, MARK  67y  Male      Patient is a 67y old  Male who presents with a chief complaint of b/l LE edema (12 Jul 2023 12:12)  Patient feels ok,nad    REVIEW OF SYSTEMS:  CONSTITUTIONAL: No fever  RESPIRATORY: No cough, hemoptysis or shortness of breath  CARDIOVASCULAR: No chest pain, palpitations, dizziness, or leg swelling  GASTROINTESTINAL: No abdominal pain. nausea, vomiting, hematemesis  GENITOURINARY: No dysuria, frequency, hematuria   NEUROLOGICAL: No headaches, no dizziness  MUSCULOSKELETAL: No joint pain or swelling;     INTERVAL HPI/OVERNIGHT EVENTS:  T(C): 36.9 (07-12-23 @ 12:10), Max: 36.9 (07-12-23 @ 12:10)  HR: 78 (07-12-23 @ 12:10) (78 - 90)  BP: 137/92 (07-12-23 @ 12:10) (122/80 - 137/92)  RR: 18 (07-12-23 @ 12:10) (18 - 18)  SpO2: 100% (07-12-23 @ 12:10) (100% - 100%)  Wt(kg): --  I&O's Summary    11 Jul 2023 07:01  -  12 Jul 2023 07:00  --------------------------------------------------------  IN: 680 mL / OUT: 1500 mL / NET: -820 mL    12 Jul 2023 07:01  -  12 Jul 2023 23:09  --------------------------------------------------------  IN: 360 mL / OUT: 0 mL / NET: 360 mL      T(C): 36.9 (07-12-23 @ 12:10), Max: 36.9 (07-12-23 @ 12:10)  HR: 78 (07-12-23 @ 12:10) (78 - 90)  BP: 137/92 (07-12-23 @ 12:10) (122/80 - 137/92)  RR: 18 (07-12-23 @ 12:10) (18 - 18)  SpO2: 100% (07-12-23 @ 12:10) (100% - 100%)  Wt(kg): --Vital Signs Last 24 Hrs  T(C): 36.9 (12 Jul 2023 12:10), Max: 36.9 (12 Jul 2023 12:10)  T(F): 98.5 (12 Jul 2023 12:10), Max: 98.5 (12 Jul 2023 12:10)  HR: 78 (12 Jul 2023 12:10) (78 - 90)  BP: 137/92 (12 Jul 2023 12:10) (122/80 - 137/92)  BP(mean): --  RR: 18 (12 Jul 2023 12:10) (18 - 18)  SpO2: 100% (12 Jul 2023 12:10) (100% - 100%)    Parameters below as of 12 Jul 2023 12:10  Patient On (Oxygen Delivery Method): room air        LABS:                        11.6   5.33  )-----------( 269      ( 12 Jul 2023 04:45 )             36.2     07-12    138  |  98  |  65<H>  ----------------------------<  84  3.4<L>   |  28  |  1.72<H>    Ca    8.8      12 Jul 2023 04:45  Phos  3.2     07-12  Mg     2.40     07-12        Urinalysis Basic - ( 12 Jul 2023 04:45 )    Color: x / Appearance: x / SG: x / pH: x  Gluc: 84 mg/dL / Ketone: x  / Bili: x / Urobili: x   Blood: x / Protein: x / Nitrite: x   Leuk Esterase: x / RBC: x / WBC x   Sq Epi: x / Non Sq Epi: x / Bacteria: x      CAPILLARY BLOOD GLUCOSE            Urinalysis Basic - ( 12 Jul 2023 04:45 )    Color: x / Appearance: x / SG: x / pH: x  Gluc: 84 mg/dL / Ketone: x  / Bili: x / Urobili: x   Blood: x / Protein: x / Nitrite: x   Leuk Esterase: x / RBC: x / WBC x   Sq Epi: x / Non Sq Epi: x / Bacteria: x        PAST MEDICAL & SURGICAL HISTORY:  Anasarca      Status post right knee replacement          MEDICATIONS  (STANDING):  atorvastatin 20 milliGRAM(s) Oral at bedtime  calcium carbonate 1250 mG  + Vitamin D (OsCal 500 + D) 1 Tablet(s) Oral two times a day  pantoprazole    Tablet 40 milliGRAM(s) Oral before breakfast    MEDICATIONS  (PRN):  acetaminophen     Tablet .. 650 milliGRAM(s) Oral every 6 hours PRN Temp greater or equal to 38C (100.4F), Mild Pain (1 - 3)  aluminum hydroxide/magnesium hydroxide/simethicone Suspension 30 milliLiter(s) Oral every 4 hours PRN Dyspepsia  melatonin 3 milliGRAM(s) Oral at bedtime PRN Insomnia  ondansetron Injectable 4 milliGRAM(s) IV Push every 8 hours PRN Nausea and/or Vomiting        RADIOLOGY & ADDITIONAL TESTS:    Imaging Personally Reviewed:  [ ] YES  [ ] NO    Consultant(s) Notes Reviewed:  [ ] YES  [ ] NO    PHYSICAL EXAM:  GENERAL: Alert and awake lying in bed in no distress  HEAD:  Atraumatic, Normocephalic  EYES: EOMI, ELLIOT, conjunctiva and sclera clear  NECK: Supple, No JVD, Normal thyroid  NERVOUS SYSTEM:  Alert & Oriented X3, Motor and sensory systems are intact,   CHEST/LUNG: Bilateral clear breath sounds, no rhochi, no wheezing, no crepitations,  HEART: Regular rate and rhythm; No murmurs, rubs, or gallops  ABDOMEN: Soft, Nontender, Nondistended; Bowel sounds present  EXTREMITIES:   Peripheral Pulses are palpable, no  edema        Care Discussed with Consultants/Other Providers [ ] YES  [ ] NO      Code Status: [] Full Code [] DNR [] DNI [] Goals of Care:   Disposition: [] ICU [] Stroke Unit [] RCU []PCU []Floor [] Discharge Home         JANEY MeekP

## 2023-07-12 NOTE — PROGRESS NOTE ADULT - ASSESSMENT
67y Male with history of nephrotic syndrome presents with volume overload. Nephrology consulted for elevated Scr.    1) Nephrotic syndrome: S/P kidney biopsy 2.5 years ago secondary to MURPHY as per outpatient nephrologist. Patient with relapse given significant proteinuria (spot urine TP/CR 9.7). Increase prednisone to 70 mg daily (1 mg/kg/day) to induce remission. Patient will likely need additional agent (CNI versus Rituxan) to maintain remission during steroid taper. Continue with statin, calcium plus vitamin D and PPI. On AC for LE DVT.    2) CKD-3a: Baseline Scr 1.5 as per outpatient nephrologist. Renal US without hydronephrosis and renal vein dopplers negative for RVT. UA with microscopic hematuria and nephrotic range proteinuria due to underlying glomerular disease. Defer further work up as patient follows with an outpatient nephrologist. Avoid nephrotoxins. Monitor electrolytes.    3) HTN with CKD: BP controlled. Plan to start ACE-I/ARB and SGLT2 inhibitor prior to discharge. Monitor BP.    4) Anasarca: Improving. Holding diuretics given plans for cardiac cath today. Patient educated on risks including DARIUSZ for which would start NS @ 70 ml/hour and continue for 6 hours post-cath today. Will start oral diuretics on discharge. TTE with severe global LVSD. Monitor UO.      [facility] NEPHROLOGY  Carl Schmidt M.D.  Leroy Quintero D.O.  Zuleyka Sherman M.D.  Shayy Garcia, MSN, ANP-C    Telephone: [phone]  Facsimile: [phone]    [address] [place] [address]

## 2023-07-13 LAB
ANION GAP SERPL CALC-SCNC: 10 MMOL/L — SIGNIFICANT CHANGE UP (ref 7–14)
BUN SERPL-MCNC: 67 MG/DL — HIGH (ref 7–23)
CALCIUM SERPL-MCNC: 8.4 MG/DL — SIGNIFICANT CHANGE UP (ref 8.4–10.5)
CHLORIDE SERPL-SCNC: 101 MMOL/L — SIGNIFICANT CHANGE UP (ref 98–107)
CO2 SERPL-SCNC: 26 MMOL/L — SIGNIFICANT CHANGE UP (ref 22–31)
CREAT SERPL-MCNC: 1.72 MG/DL — HIGH (ref 0.5–1.3)
EGFR: 43 ML/MIN/1.73M2 — LOW
GLUCOSE BLDC GLUCOMTR-MCNC: 95 MG/DL — SIGNIFICANT CHANGE UP (ref 70–99)
GLUCOSE SERPL-MCNC: 95 MG/DL — SIGNIFICANT CHANGE UP (ref 70–99)
HCT VFR BLD CALC: 31.6 % — LOW (ref 39–50)
HGB BLD-MCNC: 10.2 G/DL — LOW (ref 13–17)
MAGNESIUM SERPL-MCNC: 2.4 MG/DL — SIGNIFICANT CHANGE UP (ref 1.6–2.6)
MCHC RBC-ENTMCNC: 29.7 PG — SIGNIFICANT CHANGE UP (ref 27–34)
MCHC RBC-ENTMCNC: 32.3 GM/DL — SIGNIFICANT CHANGE UP (ref 32–36)
MCV RBC AUTO: 92.1 FL — SIGNIFICANT CHANGE UP (ref 80–100)
NRBC # BLD: 0 /100 WBCS — SIGNIFICANT CHANGE UP (ref 0–0)
NRBC # FLD: 0 K/UL — SIGNIFICANT CHANGE UP (ref 0–0)
PHOSPHATE SERPL-MCNC: 3.1 MG/DL — SIGNIFICANT CHANGE UP (ref 2.5–4.5)
PLATELET # BLD AUTO: 266 K/UL — SIGNIFICANT CHANGE UP (ref 150–400)
POTASSIUM SERPL-MCNC: 3.7 MMOL/L — SIGNIFICANT CHANGE UP (ref 3.5–5.3)
POTASSIUM SERPL-SCNC: 3.7 MMOL/L — SIGNIFICANT CHANGE UP (ref 3.5–5.3)
RBC # BLD: 3.43 M/UL — LOW (ref 4.2–5.8)
RBC # FLD: 15.6 % — HIGH (ref 10.3–14.5)
SODIUM SERPL-SCNC: 137 MMOL/L — SIGNIFICANT CHANGE UP (ref 135–145)
WBC # BLD: 5.82 K/UL — SIGNIFICANT CHANGE UP (ref 3.8–10.5)
WBC # FLD AUTO: 5.82 K/UL — SIGNIFICANT CHANGE UP (ref 3.8–10.5)

## 2023-07-13 PROCEDURE — 93458 L HRT ARTERY/VENTRICLE ANGIO: CPT | Mod: 26

## 2023-07-13 RX ADMIN — Medication 70 MILLIGRAM(S): at 07:30

## 2023-07-13 RX ADMIN — PANTOPRAZOLE SODIUM 40 MILLIGRAM(S): 20 TABLET, DELAYED RELEASE ORAL at 06:58

## 2023-07-13 RX ADMIN — Medication 1 TABLET(S): at 06:57

## 2023-07-13 RX ADMIN — SODIUM CHLORIDE 70 MILLILITER(S): 9 INJECTION INTRAMUSCULAR; INTRAVENOUS; SUBCUTANEOUS at 09:13

## 2023-07-13 RX ADMIN — Medication 1 TABLET(S): at 18:00

## 2023-07-13 NOTE — PROGRESS NOTE ADULT - PROBLEM SELECTOR PLAN 2
- troponin elevated 162 to 170; trend to peak  - in setting of kidney disease  - obtain TTE  - telemetry monitoring  -
- troponin elevated 162 to 170; trend to peak  - in setting of kidney disease  - obtain TTE  - telemetry monitoring  - appreciate cardiology input
- troponin elevated 162 to 170; trend to peak  - in setting of kidney disease  - echo
- troponin elevated 162 to 170; trend to peak  - in setting of kidney disease  - obtain TTE  - telemetry monitoring  -
- troponin elevated 162 to 170; trend to peak  - in setting of kidney disease  - obtain TTE  - telemetry monitoring  - appreciate cardiology input

## 2023-07-13 NOTE — PROGRESS NOTE ADULT - SUBJECTIVE AND OBJECTIVE BOX
STRANSKY, MARK  67y  Male      Patient is a 67y old  Male who presents with a chief complaint of b/l LE edema (13 Jul 2023 12:51)  Patient is comfortable,nad.s/p cath today.official report pending    REVIEW OF SYSTEMS:  CONSTITUTIONAL: No fever  RESPIRATORY: No cough, hemoptysis or shortness of breath  CARDIOVASCULAR: No chest pain, palpitations, dizziness, or leg swelling  GASTROINTESTINAL: No abdominal pain. nausea, vomiting, hematemesis  GENITOURINARY: No dysuria, frequency, hematuria   NEUROLOGICAL: No headaches, no dizziness  MUSCULOSKELETAL: No joint pain or swelling;     INTERVAL HPI/OVERNIGHT EVENTS:  T(C): 36.9 (07-13-23 @ 18:00), Max: 36.9 (07-12-23 @ 22:00)  HR: 82 (07-13-23 @ 18:00) (70 - 82)  BP: 138/82 (07-13-23 @ 18:00) (130/82 - 138/82)  RR: 18 (07-13-23 @ 18:00) (18 - 18)  SpO2: 100% (07-13-23 @ 18:00) (100% - 100%)  Wt(kg): --  I&O's Summary    12 Jul 2023 07:01  -  13 Jul 2023 07:00  --------------------------------------------------------  IN: 530 mL / OUT: 400 mL / NET: 130 mL    13 Jul 2023 07:01  -  13 Jul 2023 21:43  --------------------------------------------------------  IN: 240 mL / OUT: 600 mL / NET: -360 mL      T(C): 36.9 (07-13-23 @ 18:00), Max: 36.9 (07-12-23 @ 22:00)  HR: 82 (07-13-23 @ 18:00) (70 - 82)  BP: 138/82 (07-13-23 @ 18:00) (130/82 - 138/82)  RR: 18 (07-13-23 @ 18:00) (18 - 18)  SpO2: 100% (07-13-23 @ 18:00) (100% - 100%)  Wt(kg): --Vital Signs Last 24 Hrs  T(C): 36.9 (13 Jul 2023 18:00), Max: 36.9 (12 Jul 2023 22:00)  T(F): 98.4 (13 Jul 2023 18:00), Max: 98.5 (12 Jul 2023 22:00)  HR: 82 (13 Jul 2023 18:00) (70 - 82)  BP: 138/82 (13 Jul 2023 18:00) (130/82 - 138/82)  BP(mean): --  RR: 18 (13 Jul 2023 18:00) (18 - 18)  SpO2: 100% (13 Jul 2023 18:00) (100% - 100%)    Parameters below as of 13 Jul 2023 18:00  Patient On (Oxygen Delivery Method): room air        LABS:                        10.2   5.82  )-----------( 266      ( 13 Jul 2023 03:51 )             31.6     07-13    137  |  101  |  67<H>  ----------------------------<  95  3.7   |  26  |  1.72<H>    Ca    8.4      13 Jul 2023 03:51  Phos  3.1     07-13  Mg     2.40     07-13        Urinalysis Basic - ( 13 Jul 2023 03:51 )    Color: x / Appearance: x / SG: x / pH: x  Gluc: 95 mg/dL / Ketone: x  / Bili: x / Urobili: x   Blood: x / Protein: x / Nitrite: x   Leuk Esterase: x / RBC: x / WBC x   Sq Epi: x / Non Sq Epi: x / Bacteria: x      CAPILLARY BLOOD GLUCOSE      POCT Blood Glucose.: 95 mg/dL (13 Jul 2023 08:43)        Urinalysis Basic - ( 13 Jul 2023 03:51 )    Color: x / Appearance: x / SG: x / pH: x  Gluc: 95 mg/dL / Ketone: x  / Bili: x / Urobili: x   Blood: x / Protein: x / Nitrite: x   Leuk Esterase: x / RBC: x / WBC x   Sq Epi: x / Non Sq Epi: x / Bacteria: x        PAST MEDICAL & SURGICAL HISTORY:  Anasarca      Status post right knee replacement          MEDICATIONS  (STANDING):  atorvastatin 20 milliGRAM(s) Oral at bedtime  calcium carbonate 1250 mG  + Vitamin D (OsCal 500 + D) 1 Tablet(s) Oral two times a day  pantoprazole    Tablet 40 milliGRAM(s) Oral before breakfast  predniSONE   Tablet 70 milliGRAM(s) Oral daily  sodium chloride 0.9%. 1000 milliLiter(s) (70 mL/Hr) IV Continuous <Continuous>    MEDICATIONS  (PRN):  acetaminophen     Tablet .. 650 milliGRAM(s) Oral every 6 hours PRN Temp greater or equal to 38C (100.4F), Mild Pain (1 - 3)  aluminum hydroxide/magnesium hydroxide/simethicone Suspension 30 milliLiter(s) Oral every 4 hours PRN Dyspepsia  melatonin 3 milliGRAM(s) Oral at bedtime PRN Insomnia  ondansetron Injectable 4 milliGRAM(s) IV Push every 8 hours PRN Nausea and/or Vomiting        RADIOLOGY & ADDITIONAL TESTS:    Imaging Personally Reviewed:  [ ] YES  [ ] NO    Consultant(s) Notes Reviewed:  [x ] YES  [ ] NO    PHYSICAL EXAM:  GENERAL: Alert and awake lying in bed in no distress  HEAD:  Atraumatic, Normocephalic  EYES: EOMI, ELLIOT, conjunctiva and sclera clear  NECK: Supple, No JVD, Normal thyroid  NERVOUS SYSTEM:  Alert & Oriented X3, Motor and sensory systems are intact,   CHEST/LUNG: Bilateral clear breath sounds, no rhochi, no wheezing, no crepitations,  HEART: Regular rate and rhythm; No murmurs, rubs, or gallops  ABDOMEN: Soft, Nontender, Nondistended; Bowel sounds present  EXTREMITIES:   Peripheral Pulses are palpable, no  edema        Care Discussed with Consultants/Other Providers [x ] YES  [ ] NO      Code Status: [] Full Code [] DNR [] DNI [] Goals of Care:   Disposition: [] ICU [] Stroke Unit [] RCU []PCU []Floor [] Discharge Home         REKHA MeekFACP

## 2023-07-13 NOTE — PROGRESS NOTE ADULT - SUBJECTIVE AND OBJECTIVE BOX
PATIENT SEEN AND EXAMINED ON :- 7/13/23  DATE OF SERVICE:    7/13/23         Interim events noted,Labs ,Radiological studies and Cardiology tests reviewed .    MR#[id number]  [facility] COURSE: HPI:  Patient is a 68yo M with PMH significant for anasarca who presented with complaint of worsening bilateral LE edema. This has been occurring for almost two years now, but worsened in the past few months. It is associated with weeping sores in bilateral lower extremities and some stinging pain. He denies changes in vision or hearing, dizziness, lightheadedness, chest pain, shortness of breath, palpitations, abdominal pain, N/V/D, dysuria, paresthesias, or numbness.    Patient reports being diagnosed with anasarca after a kidney biopsy but does not recall the specific disease he was being treated for. He follows with his PMD Dr Kashmir Crabtree and a nephrologist Dr Ritesh Bray. He has been taking prednisone for an extended duration, recently decreased to 20mg po daily. He has also been getting diuretics with furosemide, and recently was meant to take metolazone but could not receive it from the pharmacy yet.     His wife at bedside offered corroborating information. She stated he has been having decreased exercise capacity and has unintentionally lost 30lbs over the past year as well as muscle atrophy. The patient stated that he is still able to climb 2 flights of stairs regularly in his home and walk long distances without needing to stop.       Vital signs significant for: afebrile, normocardic, hypertensive to 145/98, RR 16-18, SpO2 100% on RA    Labs significant for: Hb 11.8, troponin 162 to 170, Cr 1.60 (no prior for comparison), proBNP 61201    Imaging significant for:   - CXR: chronic bilateral apical thickening   (07 Jul 2023 18:04)      INTERIM EVENTS:Patient seen at bedside ,interim events noted.      PMH -reviewed admission note, no change since admission  HEART FAILURE: Acute[ ]Chronic[ ] Systolic[ ] Diastolic[ ] Combined Systolic and Diastolic[ ]  CAD[ ] CABG[ ] PCI[ ]  DEVICES[ ] PPM[ ] ICD[ ] ILR[ ]  ATRIAL FIBRILLATION[ ] Paroxysmal[ ] Permanent[ ] CHADS2-[  ]  FRANDY[ ] CKD1[ ] CKD2[ ] CKD3[ ] CKD4[ ] ESRD[ ]  COPD[ ] HTN[ ]   DM[ ] Type1[ ] Type 2[ ]   CVA[ ] Paresis[ ]    AMBULATION: Assisted[ ] Cane/walker[ ] Independent[ ]    MEDICATIONS  (STANDING):  atorvastatin 20 milliGRAM(s) Oral at bedtime  calcium carbonate 1250 mG  + Vitamin D (OsCal 500 + D) 1 Tablet(s) Oral two times a day  pantoprazole    Tablet 40 milliGRAM(s) Oral before breakfast  predniSONE   Tablet 70 milliGRAM(s) Oral daily  sodium chloride 0.9%. 1000 milliLiter(s) (70 mL/Hr) IV Continuous <Continuous>    MEDICATIONS  (PRN):  acetaminophen     Tablet .. 650 milliGRAM(s) Oral every 6 hours PRN Temp greater or equal to 38C (100.4F), Mild Pain (1 - 3)  aluminum hydroxide/magnesium hydroxide/simethicone Suspension 30 milliLiter(s) Oral every 4 hours PRN Dyspepsia  melatonin 3 milliGRAM(s) Oral at bedtime PRN Insomnia  ondansetron Injectable 4 milliGRAM(s) IV Push every 8 hours PRN Nausea and/or Vomiting            REVIEW OF SYSTEMS:  Constitutional: [ ] fever, [ ]weight loss,  [ ]fatigue [ ]weight gain  Eyes: [ ] visual changes  Respiratory: [ ]shortness of breath;  [ ] cough, [ ]wheezing, [ ]chills, [ ]hemoptysis  Cardiovascular: [ ] chest pain, [ ]palpitations, [ ]dizziness,  [ ]leg swelling[ ]orthopnea[ ]PND  Gastrointestinal: [ ] abdominal pain, [ ]nausea, [ ]vomiting,  [ ]diarrhea [ ]Constipation [ ]Melena  Genitourinary: [ ] dysuria, [ ] hematuria [ ]Osorio  Neurologic: [ ] headaches [ ] tremors[ ]weakness [ ]Paralysis Right[ ] Left[ ]  Skin: [ ] itching, [ ]burning, [ ] rashes  Endocrine: [ ] heat or cold intolerance  Musculoskeletal: [ ] joint pain or swelling; [ ] muscle, back, or extremity pain  Psychiatric: [ ] depression, [ ]anxiety, [ ]mood swings, or [ ]difficulty sleeping  Hematologic: [ ] easy bruising, [ ] bleeding gums    [ ] All remaining systems negative except as per above.   [ ]Unable to obtain.  [x] No change in ROS since admission      Vital Signs Last 24 Hrs  T(C): 36.9 (13 Jul 2023 18:00), Max: 36.9 (12 Jul 2023 22:00)  T(F): 98.4 (13 Jul 2023 18:00), Max: 98.5 (12 Jul 2023 22:00)  HR: 82 (13 Jul 2023 18:00) (70 - 82)  BP: 138/82 (13 Jul 2023 18:00) (130/82 - 138/82)  BP(mean): --  RR: 18 (13 Jul 2023 18:00) (18 - 18)  SpO2: 100% (13 Jul 2023 18:00) (100% - 100%)    Parameters below as of 13 Jul 2023 18:00  Patient On (Oxygen Delivery Method): room air      I&O's Summary    12 Jul 2023 07:01  -  13 Jul 2023 07:00  --------------------------------------------------------  IN: 530 mL / OUT: 400 mL / NET: 130 mL    13 Jul 2023 07:01  -  13 Jul 2023 20:53  --------------------------------------------------------  IN: 240 mL / OUT: 600 mL / NET: -360 mL        PHYSICAL EXAM:  General: No acute distress BMI-  HEENT: EOMI, PERRL  Neck: Supple, [ ] JVD  Lungs: Equal air entry bilaterally; [ ] rales [ ] wheezing [ ] rhonchi  Heart: Regular rate and rhythm; [x ] murmur   2/6 [ x] systolic [ ] diastolic [ ] radiation[ ] rubs [ ]  gallops  Abdomen: Nontender, bowel sounds present  Extremities: No clubbing, cyanosis, [ ] edema [ ]Pulses  equal and intact  Nervous system:  Alert & Oriented X3, no focal deficits  Psychiatric: Normal affect  Skin: No rashes or lesions    LABS:  07-13    137  |  101  |  67<H>  ----------------------------<  95  3.7   |  26  |  1.72<H>    Ca    8.4      13 Jul 2023 03:51  Phos  3.1     07-13  Mg     2.40     07-13      Creatinine Trend: 1.72<--, 1.72<--, 1.79<--, 1.75<--, 1.52<--, 1.49<--                        10.2   5.82  )-----------( 266      ( 13 Jul 2023 03:51 )             31.6

## 2023-07-13 NOTE — CHART NOTE - NSCHARTNOTEFT_GEN_A_CORE
Patient s/p cardiac cath via right radial access. Site examined-no bleeding or hematoma, radial pulse palpable, capillary refill <2 seconds.

## 2023-07-13 NOTE — PROGRESS NOTE ADULT - PROBLEM SELECTOR PROBLEM 3
Anasarca associated with disorder of kidney
27-Jan-2020 17:33

## 2023-07-13 NOTE — PROGRESS NOTE ADULT - ASSESSMENT
67y Male with history of nephrotic syndrome presents with volume overload. Nephrology consulted for elevated Scr.    1) Nephrotic syndrome: S/P kidney biopsy 2.5 years ago secondary to MURPHY as per outpatient nephrologist. Patient with relapse given significant proteinuria (spot urine TP/CR 9.7). Continue with prednisone 70 mg daily (1 mg/kg/day) to induce remission. Patient will likely need additional agent (CNI versus Rituxan) to maintain remission during steroid taper. Continue with statin, calcium plus vitamin D and PPI. On AC for LE DVT.    2) CKD-3a: Baseline Scr 1.5 as per outpatient nephrologist. Renal US without hydronephrosis and renal vein dopplers negative for RVT. UA with microscopic hematuria and nephrotic range proteinuria due to underlying glomerular disease. Defer further work up as patient follows with an outpatient nephrologist. Avoid nephrotoxins. Monitor electrolytes.    3) HTN with CKD: BP controlled. Plan to start ACE-I/ARB and SGLT2 inhibitor prior to discharge. Monitor BP.    4) Anasarca: Improving. Holding diuretics given plans for cardiac cath today. Patient educated on risks including DARIUSZ for which would start NS @ 70 ml/hour and continue for 6 hours post-cath today. Will start oral diuretics on discharge. TTE with severe global LVSD. Monitor UO.      [facility] NEPHROLOGY  Carl Schmidt M.D.  Leroy Quintero D.O.  Zuleyka Sherman M.D.  Shayy Garcia, MSN, ANP-C    Telephone: [phone]  Facsimile: [phone]    [address] [place] [address]

## 2023-07-13 NOTE — PROGRESS NOTE ADULT - PROBLEM SELECTOR PROBLEM 4
Normocytic anemia

## 2023-07-13 NOTE — PROGRESS NOTE ADULT - PROBLEM SELECTOR PROBLEM 1
Volume overload

## 2023-07-13 NOTE — PROGRESS NOTE ADULT - PROBLEM SELECTOR PLAN 1
#Volume overload, likely due to chronic anasarca related to kidney disease/proteinuria, possible new acute decompensated CHF  - proBNP elevated to [address], no prior for comparison  - troponin elevated 162 to 170; trend to peak  - ECG: NSR, 1st degree AV block  - tele  - start diuresis with furosemide 60mg IV daily; monitor electrolytes closely while being diuresed  - obtain TTE  - will need to gather collateral data from patient’s PMD and nephrologist  - unlikely DVT but will check b/l LE US  - resume home prednisone 20mg po daily; may need PJP ppx given prolonged steroid use  - appreciate cardiology input
#Volume overload, likely due to chronic anasarca related to kidney disease/proteinuria, possible new acute decompensated CHF  - proBNP elevated to [address], no prior for comparison  - troponin elevated 162 to 170; trend to peak  - ECG: NSR, 1st degree AV block  - tele  -Bumex drip
#Volume overload, likely due to chronic anasarca related to kidney disease/proteinuria, possible new acute decompensated CHF  - proBNP elevated to 7[address], no prior for comparison  - troponin elevated 162 to 170; trend to peak  - ECG: NSR, 1st degree AV block  - tele  -s/p Bumex drip
#Volume overload, likely due to chronic anasarca related to kidney disease/proteinuria, possible new acute decompensated CHF  - proBNP elevated to 91203, no prior for comparison  - troponin elevated 162 to 170; trend to peak  - ECG: NSR, 1st degree AV block  - tele  -s/p Bumex drip
Volume overload 2/2 nephrotic syndrome (Ddx includes MURPHY, MN, FSGS), R/O CHF  - s/p Bumex gtt   - TTE - EF 29%  - (therapeutic Lovenox on hold)  - s/p angiogram via RRA:
#Volume overload, likely due to chronic anasarca related to kidney disease/proteinuria, possible new acute decompensated CHF  - proBNP elevated to 945[address], no prior for comparison  - troponin elevated 162 to 170; trend to peak  - ECG: NSR, 1st degree AV block  - tele  -Bumex drip  -Cardiology f/u note
#Volume overload, likely due to chronic anasarca related to kidney disease/proteinuria, possible new acute decompensated CHF  - proBNP elevated to 58413, no prior for comparison  - troponin elevated 162 to 170; trend to peak  - ECG: NSR, 1st degree AV block  - tele  -s/p Bumex drip
#Volume overload, likely due to chronic anasarca related to kidney disease/proteinuria, possible new acute decompensated CHF  - proBNP elevated to 214[address], no prior for comparison  - troponin elevated 162 to 170; trend to peak  - ECG: NSR, 1st degree AV block  - tele  -Bumex drip-stopped  -Cardiology f/u
#Volume overload, likely due to chronic anasarca related to kidney disease/proteinuria, possible new acute decompensated CHF  - proBNP elevated to 98943, no prior for comparison  - troponin elevated 162 to 170; trend to peak  - ECG: NSR, 1st degree AV block  - tele  -Bumex drip-stopped  -Cardiology f/u noted..s/p cath.f/u official report
#Volume overload, likely due to chronic anasarca related to kidney disease/proteinuria, possible new acute decompensated CHF  - proBNP elevated to 340[address], no prior for comparison  - troponin elevated 162 to 170; trend to peak  - ECG: NSR, 1st degree AV block  - tele  -Bumex drip-stopped  -Cardiology f/u
#Volume overload, likely due to chronic anasarca related to kidney disease/proteinuria, possible new acute decompensated CHF  - proBNP elevated to 006[address], no prior for comparison  - troponin elevated 162 to 170; trend to peak  - ECG: NSR, 1st degree AV block  - tele  -Bumex drip-stopped  -Cardiology f/u

## 2023-07-13 NOTE — PROGRESS NOTE ADULT - ASSESSMENT
Patient is a 68yo M with PMH significant for anasarca who presented with complaint of worsening bilateral LE edema.

## 2023-07-13 NOTE — PROGRESS NOTE ADULT - PROBLEM SELECTOR PLAN 3
#Elevated creatinine  - Cr 1.60 on presentation, no prior baseline for comparison, though known kidney disease  - trend for now  - dose meds renally  - calculate FENa/FEurea  - appreciate nephrology input for likely anasarca-related
#Elevated creatinine  - Cr 1.60 on presentation, no prior baseline for comparison, though known kidney disease  - trend for now  - dose meds renally  -Bumex drip stopped  -Renal f/u noted.contaction alkalosis-check ABG in am
#Elevated creatinine  - Cr 1.60 on presentation, no prior baseline for comparison, though known kidney disease  - trend for now  - dose meds renally  -Bumex drip  -Renal f/u noted
#Elevated creatinine  - Cr 1.60 on presentation, no prior baseline for comparison, though known kidney disease  - trend for now  - dose meds renally  -Bumex drip stopped  -Renal f/u noted.  Hold diuretics per Renal.cr-1.72
#Elevated creatinine  - Cr 1.60 on presentation, no prior baseline for comparison, though known kidney disease  - trend for now  - dose meds renally  -Bumex drip  -Renal f/u noted
#Elevated creatinine  - Cr 1.60 on presentation, no prior baseline for comparison, though known kidney disease  - trend for now  - dose meds renally  -Bumex drip stopped  -Renal f/u noted.contaction alkalosis-check ABG in am
#Elevated creatinine  - Cr 1.60 on presentation, no prior baseline for comparison, though known kidney disease  - trend for now  - dose meds renally  -Bumex drip stopped  -Renal f/u noted.contaction alkalosis-check ABG in am
#Elevated creatinine  - Cr 1.60 on presentation, no prior baseline for comparison, though known kidney disease  - trend for now  - dose meds renally  -Bumex drip  -Renal f/u noted
#Elevated creatinine  - Cr 1.60 on presentation, no prior baseline for comparison, though known kidney disease  - trend for now  - dose meds renally  -Bumex drip stopped  -Renal f/u noted.  Hold diuretics per Renal.cr-1.72

## 2023-07-13 NOTE — PROGRESS NOTE ADULT - PROBLEM SELECTOR PLAN 6
- DVT PPx: HSQ

## 2023-07-13 NOTE — PROGRESS NOTE ADULT - PROBLEM SELECTOR PLAN 4
-hgb-12.5,stable
- Hb 11.8 on presentation, no prior for comparison  - check iron panel, ferritin; suspect may be related to kidney disease
-hgb-12.5,stable

## 2023-07-13 NOTE — PROGRESS NOTE ADULT - PROBLEM SELECTOR PLAN 5
#Apical lung thickening  - as seen on CXR  - obtain CT chest

## 2023-07-13 NOTE — PROGRESS NOTE ADULT - SUBJECTIVE AND OBJECTIVE BOX
[facility] NEPHROLOGY- PROGRESS NOTE    67y Male with history of nephrotic syndrome presents with volume overload. Nephrology consulted for elevated Scr.    REVIEW OF SYSTEMS:  Gen: no fevers  Cards: no chest pain  Resp: no dyspnea  GI: no nausea or vomiting or diarrhea  Vascular: + LE edema resolved    No Known Allergies      Hospital Medications: Medications reviewed      VITALS:  T(F): 98.3 (07-13-23 @ 06:54), Max: 98.5 (07-12-23 @ 12:10)  HR: 70 (07-13-23 @ 06:54)  BP: 130/82 (07-13-23 @ 06:54)  RR: 18 (07-13-23 @ 06:54)  SpO2: 100% (07-13-23 @ 06:54)  Wt(kg): --    07-12 @ 07:01  -  07-13 @ 07:00  --------------------------------------------------------  IN: 530 mL / OUT: 400 mL / NET: 130 mL        PHYSICAL EXAM:    Gen: NAD, calm  Cards: RRR, +S1/S2, no M/G/R  Resp: CTA B/L  GI: soft, NT/ND, NABS  Vascular: trace LE edema B/L        LABS:  07-13    137  |  101  |  67<H>  ----------------------------<  95  3.7   |  26  |  1.72<H>    Ca    8.4      13 Jul 2023 03:51  Phos  3.1     07-13  Mg     2.40     07-13      Creatinine Trend: 1.72 <--, 1.72 <--, 1.79 <--, 1.75 <--, 1.52 <--, 1.49 <--, 1.60 <--                        10.2   5.82  )-----------( 266      ( 13 Jul 2023 03:51 )             31.6     Urine Studies:  Urinalysis Basic - ( 13 Jul 2023 03:51 )    Color:  / Appearance:  / SG:  / pH:   Gluc: 95 mg/dL / Ketone:   / Bili:  / Urobili:    Blood:  / Protein:  / Nitrite:    Leuk Esterase:  / RBC:  / WBC    Sq Epi:  / Non Sq Epi:  / Bacteria:       Sodium, Random Urine: 114 mmol/L (07-07 @ 19:59)  Potassium, Random Urine: 21.8 mmol/L (07-07 @ 19:59)  Chloride, Random Urine: 117 mmol/L (07-07 @ 19:59)  Creatinine, Random Urine: 20 mg/dL (07-07 @ 19:59)

## 2023-07-13 NOTE — PROGRESS NOTE ADULT - PROBLEM SELECTOR PROBLEM 5
Scarring of lung

## 2023-07-14 ENCOUNTER — TRANSCRIPTION ENCOUNTER (OUTPATIENT)
Age: 68
End: 2023-07-14

## 2023-07-14 VITALS — WEIGHT: 205.91 LBS

## 2023-07-14 LAB
ANION GAP SERPL CALC-SCNC: 10 MMOL/L — SIGNIFICANT CHANGE UP (ref 7–14)
BUN SERPL-MCNC: 68 MG/DL — HIGH (ref 7–23)
CALCIUM SERPL-MCNC: 8.4 MG/DL — SIGNIFICANT CHANGE UP (ref 8.4–10.5)
CHLORIDE SERPL-SCNC: 103 MMOL/L — SIGNIFICANT CHANGE UP (ref 98–107)
CO2 SERPL-SCNC: 25 MMOL/L — SIGNIFICANT CHANGE UP (ref 22–31)
CREAT SERPL-MCNC: 1.68 MG/DL — HIGH (ref 0.5–1.3)
EGFR: 45 ML/MIN/1.73M2 — LOW
GLUCOSE SERPL-MCNC: 86 MG/DL — SIGNIFICANT CHANGE UP (ref 70–99)
HCT VFR BLD CALC: 31 % — LOW (ref 39–50)
HGB BLD-MCNC: 10 G/DL — LOW (ref 13–17)
MAGNESIUM SERPL-MCNC: 2.6 MG/DL — SIGNIFICANT CHANGE UP (ref 1.6–2.6)
MCHC RBC-ENTMCNC: 29.9 PG — SIGNIFICANT CHANGE UP (ref 27–34)
MCHC RBC-ENTMCNC: 32.3 GM/DL — SIGNIFICANT CHANGE UP (ref 32–36)
MCV RBC AUTO: 92.8 FL — SIGNIFICANT CHANGE UP (ref 80–100)
NRBC # BLD: 0 /100 WBCS — SIGNIFICANT CHANGE UP (ref 0–0)
NRBC # FLD: 0 K/UL — SIGNIFICANT CHANGE UP (ref 0–0)
PHOSPHATE SERPL-MCNC: 2.8 MG/DL — SIGNIFICANT CHANGE UP (ref 2.5–4.5)
PLATELET # BLD AUTO: 281 K/UL — SIGNIFICANT CHANGE UP (ref 150–400)
POTASSIUM SERPL-MCNC: 3.9 MMOL/L — SIGNIFICANT CHANGE UP (ref 3.5–5.3)
POTASSIUM SERPL-SCNC: 3.9 MMOL/L — SIGNIFICANT CHANGE UP (ref 3.5–5.3)
RBC # BLD: 3.34 M/UL — LOW (ref 4.2–5.8)
RBC # FLD: 15.9 % — HIGH (ref 10.3–14.5)
SODIUM SERPL-SCNC: 138 MMOL/L — SIGNIFICANT CHANGE UP (ref 135–145)
WBC # BLD: 6.96 K/UL — SIGNIFICANT CHANGE UP (ref 3.8–10.5)
WBC # FLD AUTO: 6.96 K/UL — SIGNIFICANT CHANGE UP (ref 3.8–10.5)

## 2023-07-14 RX ORDER — DAPAGLIFLOZIN 10 MG/1
10 TABLET, FILM COATED ORAL EVERY 24 HOURS
Refills: 0 | Status: DISCONTINUED | OUTPATIENT
Start: 2023-07-14 | End: 2023-07-14

## 2023-07-14 RX ORDER — ALLOPURINOL 300 MG
1 TABLET ORAL
Refills: 0 | DISCHARGE

## 2023-07-14 RX ORDER — APIXABAN 2.5 MG/1
5 TABLET, FILM COATED ORAL
Refills: 0 | Status: DISCONTINUED | OUTPATIENT
Start: 2023-07-14 | End: 2023-07-14

## 2023-07-14 RX ORDER — PANTOPRAZOLE SODIUM 20 MG/1
1 TABLET, DELAYED RELEASE ORAL
Qty: 30 | Refills: 0
Start: 2023-07-14 | End: 2023-08-12

## 2023-07-14 RX ORDER — ATORVASTATIN CALCIUM 80 MG/1
1 TABLET, FILM COATED ORAL
Qty: 30 | Refills: 0
Start: 2023-07-14 | End: 2023-08-12

## 2023-07-14 RX ORDER — FUROSEMIDE 40 MG
1 TABLET ORAL
Refills: 0 | DISCHARGE

## 2023-07-14 RX ORDER — METOPROLOL TARTRATE 50 MG
25 TABLET ORAL DAILY
Refills: 0 | Status: DISCONTINUED | OUTPATIENT
Start: 2023-07-14 | End: 2023-07-14

## 2023-07-14 RX ORDER — METOPROLOL TARTRATE 50 MG
1 TABLET ORAL
Qty: 30 | Refills: 0
Start: 2023-07-14 | End: 2023-08-12

## 2023-07-14 RX ORDER — ENOXAPARIN SODIUM 100 MG/ML
60 INJECTION SUBCUTANEOUS EVERY 12 HOURS
Refills: 0 | Status: DISCONTINUED | OUTPATIENT
Start: 2023-07-14 | End: 2023-07-14

## 2023-07-14 RX ORDER — DAPAGLIFLOZIN 10 MG/1
1 TABLET, FILM COATED ORAL
Qty: 30 | Refills: 0
Start: 2023-07-14 | End: 2023-08-12

## 2023-07-14 RX ORDER — APIXABAN 2.5 MG/1
1 TABLET, FILM COATED ORAL
Qty: 60 | Refills: 0
Start: 2023-07-14 | End: 2023-08-12

## 2023-07-14 RX ADMIN — PANTOPRAZOLE SODIUM 40 MILLIGRAM(S): 20 TABLET, DELAYED RELEASE ORAL at 06:29

## 2023-07-14 RX ADMIN — Medication 25 MILLIGRAM(S): at 10:10

## 2023-07-14 RX ADMIN — Medication 1 TABLET(S): at 06:29

## 2023-07-14 RX ADMIN — Medication 70 MILLIGRAM(S): at 06:28

## 2023-07-14 RX ADMIN — DAPAGLIFLOZIN 10 MILLIGRAM(S): 10 TABLET, FILM COATED ORAL at 10:10

## 2023-07-14 NOTE — DISCHARGE NOTE PROVIDER - HOSPITAL COURSE
68yo M with PMH significant for anasarca who presented with complaint of worsening bilateral LE edema.     Volume overload. Acute Systolic Heart Failure HFrEF: Volume overload 2/2 nephrotic syndrome (Ddx includes MURPHY, [place], FSGS), R/O CHF.  s/p Bumex gtt. TTE - EF 29% (therapeutic Lovenox on hold for angiogram, s/p cath via RRA: Non-obstructive CAD. NICM-HFrEF  Start BBlocker, no ARNI/ARB 2/2 elevated creatinine. Started on Farxiga per Dr. De Dios.     Elevated troponin: eevated 162 to 170; trend to peak. In setting of kidney disease. Telemetry monitoring.     Anasarca associated with disorder of kidney. Elevated creatinine. Cr 1.60 on presentation, no prior baseline for comparison, though known kidney disease. Trend for now  - dose meds renally. Bumex drip stopped. Renal following. Hold diuretics per Renal. Cr. 1.72.     Normocytic anemia: Hgb-12.5, stable.    Scarring of lung: Apical lung thickening seen on CXR. CT chest: No interstitial lung disease. Emphysema. Recommend annual lung cancer screening with low-dose chest CT if the patient meets the criteria.    RLE DVT: s/p Lovenox, now on Eliquis.     Case discussed with Dr. De Dios, labs/vitals/medications reviewed, Pt medically cleared for discharge to home with outpt follow up as directed with Dr. De Dios.           66yo M with PMH significant for anasarca who presented with complaint of worsening bilateral LE edema.     Volume overload. Acute Systolic Heart Failure HFrEF: Volume overload 2/2 nephrotic syndrome (Ddx includes MURPHY, MN, FSGS), R/O CHF.  s/p Bumex gtt. TTE - EF 29% (therapeutic Lovenox on hold for angiogram, s/p cath via RRA: Non-obstructive CAD. NICM-HFrEF  Start BBlocker, no ARNI/ARB 2/2 elevated creatinine. Started on Farxiga per Dr. De Dios.     Elevated troponin: elevated 162 to 170; trend to peak. In setting of kidney disease. Telemetry monitoring.     Anasarca associated with disorder of kidney. Elevated creatinine. Cr 1.60 on presentation, no prior baseline for comparison, though known kidney disease. Trend for now. Dose meds renally. Bumex drip stopped. Renal following. Hold diuretics per Renal. Cr. 1.72.     FRANDY on CKD: Renal following, Dr. Quintero: Nephrotic syndrome: S/P kidney biopsy 2.5 years ago secondary to MURPHY as per outpatient nephrologist. Patient with relapse given significant proteinuria (spot urine TP/CR 9.7). Continue with prednisone 70 mg daily (1 mg/kg/day) to induce remission. Patient will likely need additional agent (CNI versus Rituxan) to maintain remission during steroid taper. Continue with statin, calcium plus vitamin D and PPI. On AC for LE DVT. CKD-3a: Baseline Scr 1.5 as per outpatient nephrologist. Renal US without hydronephrosis and renal vein dopplers negative for RVT. UA with microscopic hematuria and nephrotic range proteinuria due to underlying glomerular disease. Defer further work up as patient follows with an outpatient nephrologist. Avoid nephrotoxins. Monitor electrolytes.    Normocytic anemia: Hgb-12.5, stable.    Scarring of lung: Apical lung thickening seen on CXR. CT chest: No interstitial lung disease. Emphysema. Recommend annual lung cancer screening with low-dose chest CT if the patient meets the criteria.    RLE DVT: s/p Lovenox, now on Eliquis, per Dr. De Dios c/w 5mg BID in setting of FRANDY on CKD.     Case discussed with Dr. De Dios, labs/vitals/medications reviewed, Pt medically cleared for discharge to home with outpt follow up as directed with Dr. De Dios.

## 2023-07-14 NOTE — DISCHARGE NOTE PROVIDER - NSDCMRMEDTOKEN_GEN_ALL_CORE_FT
allopurinol 300 mg oral tablet: 1 orally once a day  Eliquis Starter Pack for Treatment of DVT and PE 5 mg oral tablet: 1 tab(s) orally 2 times a day instructions per starter pack. PRICE CHECK ONLY  Eliquis Starter Pack for Treatment of DVT and PE 5 mg oral tablet: 1 tab(s) orally 2 times a day instructions per starter pack. PRICE CHECK ONLY  furosemide 40 mg oral tablet: 1 orally 3 times a day  predniSONE 20 mg oral tablet: 1 orally once a day   apixaban 5 mg oral tablet: 1 tab(s) orally 2 times a day  atorvastatin 20 mg oral tablet: 1 tab(s) orally once a day (at bedtime)  calcium-vitamin D 500 mg-5 mcg (200 intl units) oral tablet: 1 tab(s) orally 2 times a day  dapagliflozin 10 mg oral tablet: 1 tab(s) orally every 24 hours  metoprolol succinate 25 mg oral tablet, extended release: 1 tab(s) orally once a day  pantoprazole 40 mg oral delayed release tablet: 1 tab(s) orally once a day (before a meal)  predniSONE 10 mg oral tablet: 7 tab(s) orally once a day

## 2023-07-14 NOTE — DISCHARGE NOTE NURSING/CASE MANAGEMENT/SOCIAL WORK - PATIENT PORTAL LINK FT
You can access the FollowMyHealth Patient Portal offered by [facility] by registering at the following website: [email or web address]. By joining [facility]’s FollowMyHealth portal, you will also be able to view your health information using other applications (apps) compatible with our system.

## 2023-07-14 NOTE — PROGRESS NOTE ADULT - TIME BILLING
-d/w ACP
- Review of records, telemetry, vital signs and daily labs.   - General and cardiovascular physical examination.  - Generation of cardiovascular treatment plan.  - Coordination of care.      Patient was seen and examined by me on 7/2/23,interim events noted,labs and radiology studies reviewed.  Tejas De Dios MD,FACC.  [address] [place], [place] [id number].  [phone]
- Review of records, telemetry, vital signs and daily labs.   - General and cardiovascular physical examination.  - Generation of cardiovascular treatment plan.  - Coordination of care.      Patient was seen and examined by me on 07/14/2023,interim events noted,labs and radiology studies reviewed.  Tejas De Dios MD,FACC.  [address] [place], [place] [id number].  [phone]
- Review of records, telemetry, vital signs and daily labs.   - General and cardiovascular physical examination.  - Generation of cardiovascular treatment plan.  - Coordination of care.      Patient was seen and examined by me on 7/11/23,interim events noted,labs and radiology studies reviewed.  Tejas De Dios MD,FACC.  [address] [place], [place] [id number].  [phone]
-d/w ACP  -d/c planning
- Review of records, telemetry, vital signs and daily labs.   - General and cardiovascular physical examination.  - Generation of cardiovascular treatment plan.  - Coordination of care.      Patient was seen and examined by me on 7/9/23,interim events noted,labs and radiology studies reviewed.  Tejas De Dios MD,FACC.  [address] [place], [place] [id number].  [phone]
-d/w ACP
-d/w YU  -PT.  D/C PLANNING
- Review of records, telemetry, vital signs and daily labs.   - General and cardiovascular physical examination.  - Generation of cardiovascular treatment plan.  - Coordination of care.      Patient was seen and examined by me on 7/10/23,interim events noted,labs and radiology studies reviewed.  Tejas De Dios MD,FACC.  [address] [place], [place] [id number].  [phone]
- Review of records, telemetry, vital signs and daily labs.   - General and cardiovascular physical examination.  - Generation of cardiovascular treatment plan.  - Coordination of care.      Patient was seen and examined by me on 7/13/23,interim events noted,labs and radiology studies reviewed.  Tejas De Dios MD,FACC.  [address] [place], [place] [id number].  [phone]
-d/w ACP

## 2023-07-14 NOTE — DIETITIAN INITIAL EVALUATION ADULT - PROBLEM SELECTOR PLAN 1
#Volume overload, likely due to chronic anasarca related to kidney disease/proteinuria, possible new acute decompensated CHF  - proBNP elevated to [address], no prior for comparison  - troponin elevated 162 to 170; trend to peak  - ECG: NSR, 1st degree AV block  - telemetry monitoring  - start diuresis with furosemide 60mg IV daily; monitor electrolytes closely while being diuresed  - obtain TTE  - will need to gather collateral data from patient’s PMD and nephrologist  - unlikely DVT but will check b/l LE US  - resume home prednisone 20mg po daily; may need PJP ppx given prolonged steroid use  - appreciate cardiology input

## 2023-07-14 NOTE — DIETITIAN INITIAL EVALUATION ADULT - ORAL INTAKE PTA/DIET HISTORY
Pt reports good appetite at home and follows low na diet pta. Pt also reports fluid related wt fluctuations. UBW- 172#

## 2023-07-14 NOTE — PROGRESS NOTE ADULT - REASON FOR ADMISSION
b/l LE edema

## 2023-07-14 NOTE — PROGRESS NOTE ADULT - SUBJECTIVE AND OBJECTIVE BOX
MR#[id number]  PATIENT NAME:-MARK STRANSKY    DATE OF SERVICE: 07-14-23 @ 08:08  Patient was seen and examined by me  on    07-14-23 @ 08:08 .Interim events noted.Consultant notes ,Labs,Telemetry reviewed by me       HOSPITAL COURSE: HPI:  Patient is a 66yo M with PMH significant for anasarca who presented with complaint of worsening bilateral LE edema. This has been occurring for almost two years now, but worsened in the past few months. It is associated with weeping sores in bilateral lower extremities and some stinging pain. He denies changes in vision or hearing, dizziness, lightheadedness, chest pain, shortness of breath, palpitations, abdominal pain, N/V/D, dysuria, paresthesias, or numbness.    Patient reports being diagnosed with anasarca after a kidney biopsy but does not recall the specific disease he was being treated for. He follows with his PMD Dr Kashmir Crabtree and a nephrologist Dr Ritesh Bray. He has been taking prednisone for an extended duration, recently decreased to 20mg po daily. He has also been getting diuretics with furosemide, and recently was meant to take metolazone but could not receive it from the pharmacy yet.     INTERIM EVENTS:Patient seen at bedside ,interim events noted.Awake alert had Cardiac cath-Non obstructive CAD  Dyspnea has improved      PMH -reviewed admission note, no change since admission  HEART FAILURE: Acute[x ]Chronic[ ] Systolic[x ] Diastolic[ ] Combined Systolic and Diastolic[ ]  CAD[ ] CABG[ ] PCI[ ]  DEVICES[ ] PPM[ ] ICD[ ] ILR[ ]  ATRIAL FIBRILLATION[ ] Paroxysmal[ ] Permanent[ ] CHADS2-[  ]  FRANDY[x ] CKD1[ ] CKD2[ ] CKD3[x ] CKD4[ ] ESRD[ ]  COPD[ ] HTN[ ]   DM[ ] Type1[ ] Type 2[ ]   CVA[ ] Paresis[ ]    AMBULATION: Assisted[ ] Cane/walker[ ] Independent[x ]    MEDICATIONS  (STANDING):  atorvastatin 20 milliGRAM(s) Oral at bedtime  calcium carbonate 1250 mG  + Vitamin D (OsCal 500 + D) 1 Tablet(s) Oral two times a day  enoxaparin Injectable 60 milliGRAM(s) SubCutaneous every 12 hours  pantoprazole    Tablet 40 milliGRAM(s) Oral before breakfast  predniSONE   Tablet 70 milliGRAM(s) Oral daily  sodium chloride 0.9%. 1000 milliLiter(s) (70 mL/Hr) IV Continuous <Continuous>    MEDICATIONS  (PRN):  acetaminophen     Tablet .. 650 milliGRAM(s) Oral every 6 hours PRN Temp greater or equal to 38C (100.4F), Mild Pain (1 - 3)  aluminum hydroxide/magnesium hydroxide/simethicone Suspension 30 milliLiter(s) Oral every 4 hours PRN Dyspepsia  melatonin 3 milliGRAM(s) Oral at bedtime PRN Insomnia  ondansetron Injectable 4 milliGRAM(s) IV Push every 8 hours PRN Nausea and/or Vomiting            REVIEW OF SYSTEMS:  Constitutional: [ ] fever, [ ]weight loss,  [x ]fatigue [ ]weight gain  Eyes: [ ] visual changes  Respiratory: [ ]shortness of breath;  [ ] cough, [ ]wheezing, [ ]chills, [ ]hemoptysis  Cardiovascular: [ ] chest pain, [ ]palpitations, [ ]dizziness,  [ ]leg swelling[ ]orthopnea[ ]PND  Gastrointestinal: [ ] abdominal pain, [ ]nausea, [ ]vomiting,  [ ]diarrhea [ ]Constipation [ ]Melena  Genitourinary: [ ] dysuria, [ ] hematuria [ ]Osorio  Neurologic: [ ] headaches [ ] tremors[ ]weakness [ ]Paralysis Right[ ] Left[ ]  Skin: [ ] itching, [ ]burning, [ ] rashes  Endocrine: [ ] heat or cold intolerance  Musculoskeletal: [ ] joint pain or swelling; [ ] muscle, back, or extremity pain  Psychiatric: [ ] depression, [ ]anxiety, [ ]mood swings, or [ ]difficulty sleeping  Hematologic: [ ] easy bruising, [ ] bleeding gums    [ ] All remaining systems negative except as per above.   [ ]Unable to obtain.  [x] No change in ROS since admission      Vital Signs Last 24 Hrs  T(C): 37.3 (13 Jul 2023 23:06), Max: 37.3 (13 Jul 2023 23:06)  T(F): 99.2 (13 Jul 2023 23:06), Max: 99.2 (13 Jul 2023 23:06)  HR: 80 (13 Jul 2023 23:06) (80 - 82)  BP: 138/82 (13 Jul 2023 23:06) (136/89 - 138/82)  RR: 18 (13 Jul 2023 23:06) (18 - 18)  SpO2: 99% (13 Jul 2023 23:06) (99% - 100%)    Parameters below as of 13 Jul 2023 23:06  Patient On (Oxygen Delivery Method): room air      I&O's Summary    13 Jul 2023 07:01  -  14 Jul 2023 07:00  --------------------------------------------------------  IN: 240 mL / OUT: 800 mL / NET: -560 mL        PHYSICAL EXAM:  General: No acute distress BMI-29  HEENT: EOMI, PERRL  Neck: Supple, [ ] JVD  Lungs: Equal air entry bilaterally; [ ] rales [ ] wheezing [ ] rhonchi  Heart: Regular rate and rhythm; [x ] murmur   2/6 [ x] systolic [ ] diastolic [ ] radiation[ ] rubs [ ]  gallops  Abdomen: Nontender, bowel sounds present  Extremities: No clubbing, cyanosis, [trace ] edema [ ]Pulses  equal and intact  Nervous system:  Alert & Oriented X3, no focal deficits  Psychiatric: Normal affect  Skin: No rashes or lesions    LABS:  07-13    137  |  101  |  67<H>  ----------------------------<  95  3.7   |  26  |  1.72<H>    Ca    8.4      13 Jul 2023 03:51  Phos  3.1     07-13  Mg     2.40     07-13      Creatinine Trend: 1.72<--, 1.72<--, 1.79<--, 1.75<--, 1.52<--, 1.49<--                        10.0   6.96  )-----------( 281      ( 14 Jul 2023 04:11 )             31.0          Transthoracic Echocardiogram (07.10.23 @ 08:26) >  CONCLUSIONS:  1. Mitral annular calcification, otherwise normal mitral valve. Mild mitral regurgitation.  2. Mildly dilated left atrium.  LA volume index = 35 cc/m2.  3. Normal left ventricular internal dimensions and wall thicknesses.  4. Severe global left ventricular systolic dysfunction. EF 29%  5. Normal right ventricular size and function.  *** No previous Echo exam.      CT Chest No Cont (07.07.23 @ 18:39) >  IMPRESSION:  No interstitial lung disease.    Emphysema. Recommend annual lung cancer screening with low-dose chest CT  if the patient meets the criteria.         VA Duplex Ext Veins Lower Comp, Bilat. (07.08.23 @ 11:45) >  Summary/Impressions:  1.  Age-indeterminate, non-occlusive deep vein thrombosis is noted in the right mid-calf gastrocnemius vein.  2.  No evidence of deep vein thrombosis in the left lower extremity.    3.  Superficial venous insufficiency noted in the left great saphenous vein.  No other evidence of deep or  superficial venous insufficiency noted in the left lower extremity.  Results communicated to Dr. De Dios with readback confirmation on 07/08/23 at 12:45 pm.

## 2023-07-14 NOTE — DIETITIAN INITIAL EVALUATION ADULT - PERTINENT LABORATORY DATA
07-14    138  |  103  |  68<H>  ----------------------------<  86  3.9   |  25  |  1.68<H>    Ca    8.4      14 Jul 2023 04:11  Phos  2.8     07-14  Mg     2.60     07-14

## 2023-07-14 NOTE — PROGRESS NOTE ADULT - PROVIDER SPECIALTY LIST ADULT
Nephrology
Cardiology
Internal Medicine
Internal Medicine
Cardiology
Internal Medicine
Internal Medicine
Cardiology
Internal Medicine
Cardiology
Internal Medicine

## 2023-07-14 NOTE — PROGRESS NOTE ADULT - ASSESSMENT
Patient is a 66yo M with PMH significant for anasarca who presented with complaint of worsening bilateral LE edema.       Problem/Plan - 1:  ·  Problem: Volume overload. Acute Systolic Heart Failure HFrEF  ·  Plan: Volume overload 2/2 nephrotic syndrome (Ddx includes MURPHY, MN, FSGS), R/O CHF  - s/p Bumex gtt   - TTE - EF 29%  - (therapeutic Lovenox on hold)  - s/p angiogram via RRA -Non obstructive CAD  NICM-HFrEF  Start BBlocker,  -No ARNI/ARB 2/2 elevated creatinine  Will add Jardiance as outpatient    Problem/Plan - 2:  ·  Problem: Elevated troponin.   ·  Plan: - troponin elevated 162 to 170; trend to peak  - in setting of kidney disease  - telemetry monitoring  -.    Problem/Plan - 3:  ·  Problem: Anasarca associated with disorder of kidney.   ·  Plan: #Elevated creatinine  - Cr 1.60 on presentation, no prior baseline for comparison, though known kidney disease  - trend for now  - dose meds renally  -Bumex drip stopped  -Renal f/u noted.  Hold diuretics per Renal.cr-1.72.    Problem/Plan - 4:  ·  Problem: Normocytic anemia.   ·  Plan: -hgb-12.5,stable.    Problem/Plan - 5:  ·  Problem: Scarring of lung.   ·  Plan: #Apical lung thickening  - as seen on CXR  - obtain CT chest.    Problem/Plan - 6:  ·  Problem: DVT  -was on Lovenox  Start Eliquis       Discharge home to follow up in office  [address] [place] [address]  [phone] Wednesday-7/19 at 1PM

## 2023-07-14 NOTE — DISCHARGE NOTE PROVIDER - CARE PROVIDER_API CALL
Tejas De Dios  Cardiology  [address] [place] [address]  Phone: [phone]  Fax: [phone]  Follow Up Time:    Tejas De Dios  Cardiology  [address] [place] [address]  Phone: [phone]  Fax: [phone]  Follow Up Time:     Leroy Quintero  Nephrology  [address], [place] [address]  Phone: [phone]  Fax: [phone]  Follow Up Time:

## 2023-07-14 NOTE — DIETITIAN INITIAL EVALUATION ADULT - NS FNS DIET ORDER
Diet, Regular:   Low Sodium  Supplement Feeding Modality:  Oral  Ensure Enlive Cans or Servings Per Day:  1       Frequency:  Three Times a day (07-08-23 @ 15:33)

## 2023-07-14 NOTE — DIETITIAN INITIAL EVALUATION ADULT - PERTINENT MEDS FT
MEDICATIONS  (STANDING):  apixaban 5 milliGRAM(s) Oral two times a day  atorvastatin 20 milliGRAM(s) Oral at bedtime  calcium carbonate 1250 mG  + Vitamin D (OsCal 500 + D) 1 Tablet(s) Oral two times a day  dapagliflozin 10 milliGRAM(s) Oral every 24 hours  metoprolol succinate ER 25 milliGRAM(s) Oral daily  pantoprazole    Tablet 40 milliGRAM(s) Oral before breakfast  predniSONE   Tablet 70 milliGRAM(s) Oral daily  sodium chloride 0.9%. 1000 milliLiter(s) (70 mL/Hr) IV Continuous <Continuous>    MEDICATIONS  (PRN):  acetaminophen     Tablet .. 650 milliGRAM(s) Oral every 6 hours PRN Temp greater or equal to 38C (100.4F), Mild Pain (1 - 3)  aluminum hydroxide/magnesium hydroxide/simethicone Suspension 30 milliLiter(s) Oral every 4 hours PRN Dyspepsia  melatonin 3 milliGRAM(s) Oral at bedtime PRN Insomnia  ondansetron Injectable 4 milliGRAM(s) IV Push every 8 hours PRN Nausea and/or Vomiting

## 2023-07-14 NOTE — DISCHARGE NOTE PROVIDER - PROVIDER TOKENS
PROVIDER:[TOKEN:[[id number]]] PROVIDER:[TOKEN:[[id number]]],PROVIDER:[TOKEN:[[id number]]]

## 2023-07-14 NOTE — DISCHARGE NOTE PROVIDER - NSDCCPCAREPLAN_GEN_ALL_CORE_FT
PRINCIPAL DISCHARGE DIAGNOSIS  Diagnosis: Acute systolic heart failure  Assessment and Plan of Treatment: Your diuretic (lasix) was held due to renal injury. Appointment arranged with Dr. De Dios as noted below for further monitoring and to reassess when diuretic to be resumed. Low salt intake. Restrict fluid intake to 1-1.5L of water daily. Monitor daily weights. If you note weight gain >2-3lbs in one week, follow up with your doctor or return to the hospital immediately. Follow up with your cardiologist Dr. De Dios as outpatient, appointment arranged Wednesday-7/19 at 1PM. [facility] [place] [address]. [phone]. Ensure compliance with your medications to improve heart function, you will need repeat echocardiogram in 3 months to assess for improvement.      SECONDARY DISCHARGE DIAGNOSES  Diagnosis: Anasarca associated with disorder of kidney  Assessment and Plan of Treatment: Improving. Your diuretic was held as noted above. Follow up with your cardiologist as directed above regarding when to resume.    Diagnosis: Acute kidney injury superimposed on CKD  Assessment and Plan of Treatment: Your prednisone was increased to 70mg daily given significant protein in your urine, with history of minimal change disease (MURPHY) per your outpt kidney doctor. Please call your nephrologist to arrange an appointment within 1 week as you will need further management and monitoring.    Diagnosis: DVT (deep venous thrombosis)  Assessment and Plan of Treatment: You were started on Eliquis 5mg twice daily. Ensure compliance with this medication as directed. Follow up with your primary care physician for further monitoring in 1-2 weeks. Please call to arrange appointment.    Diagnosis: Scarring of lung  Assessment and Plan of Treatment: Your CT of the chest showed: No interstitial lung disease. Emphysema. Recommend outpt follow up with your PCP for further monitoring and surveillance.    Diagnosis: Elevated troponin  Assessment and Plan of Treatment: You underwent a cardiac catheterization which did not reveal any blockages.  Follow up with your cardiologist Dr. De Dios as outpatient, appointment arranged Wednesday-7/19 at 1PM. [address] [place] [address]. [phone].    Diagnosis: Normocytic anemia  Assessment and Plan of Treatment: Stable. Follow up with your primary care physician for further monitoring in 1-2 weeks. Please call to arrange appointment.  Monitor for bleeding while on Eliquis (blood thinner).

## 2023-07-14 NOTE — PHARMACOTHERAPY INTERVENTION NOTE - COMMENTS
New medications reviewed with patient and wife over the phone. Outpatient medication schedule was discussed in detail including: medication name, indication, dose, administration times, treatment duration, side effects  and special instructions.  Also discussed heart failure diagnosis, fluid and salt restrictions, and maintaining a log of daily weights. Also discussed warning signs of fluid overload (SOB, orthopnea, PULIDO, edema, etc.). Patient verbalized understanding. Informed patient that medication list may change prior to discharge. Patient provided with educational booklet and drug cards.    Velma Chakraborty, WesleyD, Clinical Pharmacy Specialist, [id number]

## 2023-07-14 NOTE — DISCHARGE NOTE NURSING/CASE MANAGEMENT/SOCIAL WORK - NSDCPEFALRISK_GEN_ALL_CORE
For information on Fall & Injury Prevention, visit: [email or web address] OR  [email or web address] OR  https://www.cdc.gov/steadi/patient.html

## 2023-07-14 NOTE — DISCHARGE NOTE NURSING/CASE MANAGEMENT/SOCIAL WORK - NSDCFUADDAPPT_GEN_ALL_CORE_FT
Follow up with Dr. De Dios   [address] [place] [address]  [phone]  On Wednesday-7/19 at 1PM    Outpt nephrologist or Dr. Quintero who saw you at [facility] within 1 week.

## 2023-07-18 ENCOUNTER — TRANSCRIPTION ENCOUNTER (OUTPATIENT)
Age: 68
End: 2023-07-18

## 2023-07-18 ENCOUNTER — NON-APPOINTMENT (OUTPATIENT)
Age: 68
End: 2023-07-18

## 2023-07-19 NOTE — CHART NOTE - NSCHARTNOTEFT_GEN_A_CORE
Post-Discharge Medication Review    Patient was contacted to offer medication counseling post-discharge. Patient declined counseling. Patient reported taking his medications as prescribed and not experiencing any side effects.

## 2023-07-20 ENCOUNTER — TRANSCRIPTION ENCOUNTER (OUTPATIENT)
Age: 68
End: 2023-07-20

## 2023-08-01 ENCOUNTER — TRANSCRIPTION ENCOUNTER (OUTPATIENT)
Age: 68
End: 2023-08-01

## 2023-08-07 ENCOUNTER — TRANSCRIPTION ENCOUNTER (OUTPATIENT)
Age: 68
End: 2023-08-07

## 2023-08-24 ENCOUNTER — APPOINTMENT (OUTPATIENT)
Dept: NEPHROLOGY | Facility: CLINIC | Age: 68
End: 2023-08-24
Payer: MEDICARE

## 2023-08-24 VITALS
OXYGEN SATURATION: 100 % | SYSTOLIC BLOOD PRESSURE: 140 MMHG | TEMPERATURE: 97.1 F | WEIGHT: 181.88 LBS | HEART RATE: 96 BPM | DIASTOLIC BLOOD PRESSURE: 72 MMHG

## 2023-08-24 DIAGNOSIS — Z00.00 ENCOUNTER FOR GENERAL ADULT MEDICAL EXAMINATION W/OUT ABNORMAL FINDINGS: ICD-10-CM

## 2023-08-24 PROCEDURE — 99205 OFFICE O/P NEW HI 60 MIN: CPT | Mod: GC

## 2023-08-24 RX ORDER — VERICIGUAT 2.5 MG/1
2.5 TABLET, FILM COATED ORAL
Qty: 30 | Refills: 0 | Status: ACTIVE | COMMUNITY
Start: 2023-08-24

## 2023-08-24 RX ORDER — ATORVASTATIN CALCIUM 20 MG/1
20 TABLET, FILM COATED ORAL
Qty: 30 | Refills: 0 | Status: ACTIVE | COMMUNITY
Start: 2023-07-14

## 2023-08-24 RX ORDER — ALLOPURINOL 300 MG/1
300 TABLET ORAL
Qty: 30 | Refills: 0 | Status: DISCONTINUED | COMMUNITY
Start: 2023-08-16

## 2023-08-24 NOTE — ASSESSMENT
[FreeTextEntry1] : 68-year-old gentleman with NS, with bx proven MURPHY here for GN opinion. We will first need to perform appropriate cancer screening. will do CT neck/chest/abd-pelv (r/o thymoma given frequent relapses, r/o lung Ca given previous CT findings, r/o colon and prostate Ca). Will do unenhanced scan as a first step given elevated creat. 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  Will also screen for hematological malignancies such as myeloma and lymphoma (send off FLC, SPEP, peripheral blood flow cytometry ). After conformation of diagnosis and ruling out malignancy, management consists of rituximab 1g IV x2 doses 2 weeks apart. Will send off HepB screening and Quant gold today in anticipation.  Patient provided consent for rituximab. Continue prograf for now as well as diuretics. Increase lasix to 40mg BID for uncontrolled edema.  He will alternate follow up between us and primary nephrologist Dr Quintero

## 2023-08-24 NOTE — HISTORY OF PRESENT ILLNESS
[FreeTextEntry1] : 68-year-old gentleman here to establish care for NS. He initially started having swelling around April 2021, 1 month after her received his first shot of the COVID vaccine. He underwent kidney biopsy at [facility] but is unsure of the diagnosis. From review of outside nephrology notes and pathology report-- diagnosis was MURPHY. He was initially treated with prednisone taper but unfortunately experienced 2 relapses, which he relates temporally to his 2nd and 3rd doses of the COVID vaccine. He was treated with courses of prednisone for both relapses. In July 2023, he was admitted for anasarca and required IV diuresis. He had FRANDY with creat up to 1.8 (from baseline of 1.5). Renal US did not show hydronephrosis and renal vein dopplers were negative for RVT. UA showed hematuria and nephrotic range proteinuria. UPCR was 9.7g/g. He was also diagnosed with HFrEF. He was started on low-dose tacrolimus for his NS (2mg BID) and discharged on lasix 40mg OD and metolazone 2.5mg OD. He reports that his edema returned 2 days following his discharge. With regards to cancer screening, his last colonoscopy was around 5 years ago and normal per his account. he has never had prostate issues. He had a chest CT during hospitalization which showed: No interstitial lung disease. Emphysema. Recommend annual lung cancer screening with low-dose chest CT if the patient meets the criteria. He was noted to be slightly anemic with hgb of 12.5 g/dL on admission.  He denies any recent infections or vaccines other than COVID. He is an ex-smoker. he smoked for a couple of years but quit 40 years ago. He was very active (played tennis, swimming, gym 5 times a week) until a couple of months ago when he started having difficulty ambulating. he was told he needs a knee replacement but everything is currently on hold given kidney disease.

## 2023-08-24 NOTE — END OF VISIT
[] : Fellow [FreeTextEntry3] : Off steroids Steroid responsive MURPHY, not on tacro 2mg BID as a bridge to Rituxan  Once rituxan done, can come off tacro dosing in 1 month post Pt has edema- increase lasix to 40mg BID  Needs CT without contrast, LDH and flow cytometry as above Fu 3 months

## 2023-08-24 NOTE — PHYSICAL EXAM
[General Appearance - Alert] : alert [Respiration, Rhythm And Depth] : normal respiratory rhythm and effort [Heart Sounds] : normal S1 and S2 [Abdomen Soft] : soft [FreeTextEntry1] : ill-looking

## 2023-08-25 LAB
ALBUMIN SERPL ELPH-MCNC: 3.1 G/DL
ANION GAP SERPL CALC-SCNC: 15 MMOL/L
APPEARANCE: CLEAR
APTT BLD: 45.7 SEC
BACTERIA: NEGATIVE /HPF
BILIRUBIN URINE: NEGATIVE
BLOOD URINE: ABNORMAL
BUN SERPL-MCNC: 52 MG/DL
C3 SERPL-MCNC: 167 MG/DL
C4 SERPL-MCNC: 29 MG/DL
CALCIUM SERPL-MCNC: 9.4 MG/DL
CALCIUM SERPL-MCNC: 9.4 MG/DL
CAST: 2 /LPF
CD16+CD56+ CELLS # BLD: 397 CELLS/UL
CD16+CD56+ CELLS NFR BLD: 42 %
CD19 CELLS NFR BLD: 19 CELLS/UL
CD3 CELLS # BLD: 531 CELLS/UL
CD3 CELLS NFR BLD: 52 %
CD3+CD4+ CELLS # BLD: 494 CELLS/UL
CD3+CD4+ CELLS NFR BLD: 45 %
CD3+CD4+ CELLS/CD3+CD8+ CLL SPEC: 6.94 RATIO
CD3+CD8+ CELLS # SPEC: 71 CELLS/UL
CD3+CD8+ CELLS NFR BLD: 6 %
CELLS.CD3-CD19+/CELLS IN BLOOD: 2 %
CHLORIDE SERPL-SCNC: 103 MMOL/L
CO2 SERPL-SCNC: 25 MMOL/L
COLOR: YELLOW
CREAT SERPL-MCNC: 1.86 MG/DL
CREAT SPEC-SCNC: 73 MG/DL
CREAT/PROT UR: 6.5 RATIO
DEPRECATED KAPPA LC FREE/LAMBDA SER: 1.4 RATIO
EGFR: 39 ML/MIN/1.73M2
EPITHELIAL CELLS: 0 /HPF
GLUCOSE QUALITATIVE U: NEGATIVE MG/DL
GLUCOSE SERPL-MCNC: 96 MG/DL
HBV CORE IGG+IGM SER QL: NONREACTIVE
HBV CORE IGM SER QL: NONREACTIVE
HBV SURFACE AB SER QL: NONREACTIVE
HBV SURFACE AB SERPL IA-ACNC: <3 MIU/ML
HBV SURFACE AG SER QL: NONREACTIVE
HCV AB SER QL: NONREACTIVE
HCV S/CO RATIO: 0.08 S/CO
INR PPP: 1.07 RATIO
KAPPA LC CSF-MCNC: 5.85 MG/DL
KAPPA LC SERPL-MCNC: 8.19 MG/DL
KETONES URINE: NEGATIVE MG/DL
LDH SERPL-CCNC: 272 U/L
LEUKOCYTE ESTERASE URINE: NEGATIVE
MAGNESIUM SERPL-MCNC: 2.4 MG/DL
MICROSCOPIC-UA: NORMAL
NITRITE URINE: NEGATIVE
PARATHYROID HORMONE INTACT: 14 PG/ML
PH URINE: 6.5
PHOSPHATE SERPL-MCNC: 4.2 MG/DL
POTASSIUM SERPL-SCNC: 4.7 MMOL/L
PROT UR-MCNC: 474 MG/DL
PROTEIN URINE: 300 MG/DL
PT BLD: 12.1 SEC
RED BLOOD CELLS URINE: 6 /HPF
SODIUM SERPL-SCNC: 143 MMOL/L
SPECIFIC GRAVITY URINE: 1.02
TACROLIMUS SERPL-MCNC: <2 NG/ML
UROBILINOGEN URINE: 0.2 MG/DL
WHITE BLOOD CELLS URINE: 0 /HPF

## 2023-08-26 LAB — M PROTEIN SPEC IFE-MCNC: NORMAL

## 2023-08-28 LAB — PHOSPHOLIPASE A2 RECEPTOR ELISA: <1.8 RU/ML

## 2023-08-29 ENCOUNTER — APPOINTMENT (OUTPATIENT)
Dept: CT IMAGING | Facility: CLINIC | Age: 68
End: 2023-08-29
Payer: MEDICARE

## 2023-08-29 ENCOUNTER — OUTPATIENT (OUTPATIENT)
Dept: OUTPATIENT SERVICES | Facility: HOSPITAL | Age: 68
LOS: 1 days | End: 2023-08-29
Payer: MEDICARE

## 2023-08-29 DIAGNOSIS — N05.0 UNSPECIFIED NEPHRITIC SYNDROME WITH MINOR GLOMERULAR ABNORMALITY: ICD-10-CM

## 2023-08-29 DIAGNOSIS — Z96.651 PRESENCE OF RIGHT ARTIFICIAL KNEE JOINT: Chronic | ICD-10-CM

## 2023-08-29 LAB
M TB IFN-G BLD-IMP: NEGATIVE
QUANTIFERON TB PLUS MITOGEN MINUS NIL: 1.64 IU/ML
QUANTIFERON TB PLUS NIL: 0.01 IU/ML
QUANTIFERON TB PLUS TB1 MINUS NIL: 0 IU/ML
QUANTIFERON TB PLUS TB2 MINUS NIL: 0 IU/ML

## 2023-08-29 PROCEDURE — 70490 CT SOFT TISSUE NECK W/O DYE: CPT

## 2023-08-29 PROCEDURE — 71250 CT THORAX DX C-: CPT | Mod: 26,MH

## 2023-08-29 PROCEDURE — 74176 CT ABD & PELVIS W/O CONTRAST: CPT | Mod: 26,MH

## 2023-08-29 PROCEDURE — 74176 CT ABD & PELVIS W/O CONTRAST: CPT

## 2023-08-29 PROCEDURE — 70490 CT SOFT TISSUE NECK W/O DYE: CPT | Mod: 26,MH

## 2023-08-29 PROCEDURE — 71250 CT THORAX DX C-: CPT

## 2023-09-18 RX ORDER — DIPHENHYDRAMINE HCL 25 MG/1
25 TABLET ORAL
Refills: 0 | Status: COMPLETED | OUTPATIENT
Start: 2023-09-18 | End: 1900-01-01

## 2023-09-18 RX ORDER — RITUXIMAB 10 MG/ML
500 INJECTION, SOLUTION INTRAVENOUS
Refills: 0 | Status: COMPLETED | OUTPATIENT
Start: 2023-09-18 | End: 1900-01-01

## 2023-09-18 RX ORDER — METHYLPREDNISOLONE SODIUM SUCCINATE 125 MG/2ML
125 INJECTION, POWDER, FOR SOLUTION INTRAMUSCULAR; INTRAVENOUS
Refills: 0 | Status: COMPLETED | OUTPATIENT
Start: 2023-09-18 | End: 1900-01-01

## 2023-09-18 RX ORDER — ACETAMINOPHEN 500 MG/1
500 TABLET ORAL
Refills: 0 | Status: COMPLETED | OUTPATIENT
Start: 2023-09-18 | End: 1900-01-01

## 2023-09-26 ENCOUNTER — APPOINTMENT (OUTPATIENT)
Dept: RHEUMATOLOGY | Facility: CLINIC | Age: 68
End: 2023-09-26
Payer: MEDICARE

## 2023-09-26 VITALS
OXYGEN SATURATION: 98 % | HEART RATE: 81 BPM | DIASTOLIC BLOOD PRESSURE: 86 MMHG | SYSTOLIC BLOOD PRESSURE: 136 MMHG | TEMPERATURE: 97.8 F

## 2023-09-26 VITALS
HEART RATE: 76 BPM | DIASTOLIC BLOOD PRESSURE: 88 MMHG | OXYGEN SATURATION: 98 % | SYSTOLIC BLOOD PRESSURE: 162 MMHG | TEMPERATURE: 97.2 F

## 2023-09-26 VITALS
DIASTOLIC BLOOD PRESSURE: 94 MMHG | SYSTOLIC BLOOD PRESSURE: 149 MMHG | HEART RATE: 68 BPM | TEMPERATURE: 97.8 F | OXYGEN SATURATION: 99 %

## 2023-09-26 PROCEDURE — 96413 CHEMO IV INFUSION 1 HR: CPT | Mod: PD

## 2023-09-26 PROCEDURE — 96415 CHEMO IV INFUSION ADDL HR: CPT | Mod: PD

## 2023-09-26 PROCEDURE — 96374 THER/PROPH/DIAG INJ IV PUSH: CPT | Mod: 59,PD

## 2023-09-27 RX ORDER — METHYLPREDNISOLONE SODIUM SUCCINATE 125 MG/2ML
125 INJECTION, POWDER, FOR SOLUTION INTRAMUSCULAR; INTRAVENOUS
Qty: 0 | Refills: 0 | Status: COMPLETED
Start: 2023-09-18

## 2023-09-27 RX ORDER — RITUXIMAB 10 MG/ML
500 INJECTION, SOLUTION INTRAVENOUS
Qty: 0 | Refills: 0 | Status: COMPLETED
Start: 2023-09-18

## 2023-09-27 RX ORDER — DIPHENHYDRAMINE HCL 25 MG/1
25 TABLET ORAL
Qty: 0 | Refills: 0 | Status: COMPLETED
Start: 2023-09-18

## 2023-09-27 RX ORDER — ACETAMINOPHEN 500 MG/1
500 TABLET ORAL
Qty: 0 | Refills: 0 | Status: COMPLETED
Start: 2023-09-18

## 2023-09-29 ENCOUNTER — INPATIENT (INPATIENT)
Facility: HOSPITAL | Age: 68
LOS: 3 days | Discharge: HOME | End: 2023-10-03
Attending: INTERNAL MEDICINE | Admitting: INTERNAL MEDICINE
Payer: MEDICARE

## 2023-09-29 VITALS
RESPIRATION RATE: 16 BRPM | OXYGEN SATURATION: 96 % | HEART RATE: 82 BPM | DIASTOLIC BLOOD PRESSURE: 58 MMHG | TEMPERATURE: 98 F | SYSTOLIC BLOOD PRESSURE: 119 MMHG

## 2023-09-29 DIAGNOSIS — Z29.9 ENCOUNTER FOR PROPHYLACTIC MEASURES, UNSPECIFIED: ICD-10-CM

## 2023-09-29 DIAGNOSIS — M79.89 OTHER SPECIFIED SOFT TISSUE DISORDERS: ICD-10-CM

## 2023-09-29 DIAGNOSIS — I50.9 HEART FAILURE, UNSPECIFIED: ICD-10-CM

## 2023-09-29 DIAGNOSIS — N04.9 NEPHROTIC SYNDROME WITH UNSPECIFIED MORPHOLOGIC CHANGES: ICD-10-CM

## 2023-09-29 DIAGNOSIS — Z96.651 PRESENCE OF RIGHT ARTIFICIAL KNEE JOINT: Chronic | ICD-10-CM

## 2023-09-29 DIAGNOSIS — N17.9 ACUTE KIDNEY FAILURE, UNSPECIFIED: ICD-10-CM

## 2023-09-29 DIAGNOSIS — E11.9 TYPE 2 DIABETES MELLITUS WITHOUT COMPLICATIONS: ICD-10-CM

## 2023-09-29 DIAGNOSIS — E78.5 HYPERLIPIDEMIA, UNSPECIFIED: ICD-10-CM

## 2023-09-29 DIAGNOSIS — Z86.718 PERSONAL HISTORY OF OTHER VENOUS THROMBOSIS AND EMBOLISM: ICD-10-CM

## 2023-09-29 DIAGNOSIS — R60.1 GENERALIZED EDEMA: ICD-10-CM

## 2023-09-29 DIAGNOSIS — D64.9 ANEMIA, UNSPECIFIED: ICD-10-CM

## 2023-09-29 LAB
ALBUMIN SERPL ELPH-MCNC: 3.3 G/DL — SIGNIFICANT CHANGE UP (ref 3.3–5)
ALP SERPL-CCNC: 95 U/L — SIGNIFICANT CHANGE UP (ref 40–120)
ALT FLD-CCNC: 14 U/L — SIGNIFICANT CHANGE UP (ref 4–41)
ANION GAP SERPL CALC-SCNC: 15 MMOL/L — HIGH (ref 7–14)
APPEARANCE UR: CLEAR — SIGNIFICANT CHANGE UP
AST SERPL-CCNC: 18 U/L — SIGNIFICANT CHANGE UP (ref 4–40)
BACTERIA # UR AUTO: NEGATIVE /HPF — SIGNIFICANT CHANGE UP
BASOPHILS # BLD AUTO: 0.05 K/UL — SIGNIFICANT CHANGE UP (ref 0–0.2)
BASOPHILS NFR BLD AUTO: 0.6 % — SIGNIFICANT CHANGE UP (ref 0–2)
BILIRUB SERPL-MCNC: <0.2 MG/DL — SIGNIFICANT CHANGE UP (ref 0.2–1.2)
BILIRUB UR-MCNC: NEGATIVE — SIGNIFICANT CHANGE UP
BUN SERPL-MCNC: 104 MG/DL — HIGH (ref 7–23)
CALCIUM SERPL-MCNC: 8.4 MG/DL — SIGNIFICANT CHANGE UP (ref 8.4–10.5)
CAST: 2 /LPF — SIGNIFICANT CHANGE UP (ref 0–4)
CHLORIDE SERPL-SCNC: 103 MMOL/L — SIGNIFICANT CHANGE UP (ref 98–107)
CO2 SERPL-SCNC: 24 MMOL/L — SIGNIFICANT CHANGE UP (ref 22–31)
COLOR SPEC: YELLOW — SIGNIFICANT CHANGE UP
CREAT ?TM UR-MCNC: 89 MG/DL — SIGNIFICANT CHANGE UP
CREAT SERPL-MCNC: 2.8 MG/DL — HIGH (ref 0.5–1.3)
DIFF PNL FLD: ABNORMAL
EGFR: 24 ML/MIN/1.73M2 — LOW
EOSINOPHIL # BLD AUTO: 0.06 K/UL — SIGNIFICANT CHANGE UP (ref 0–0.5)
EOSINOPHIL NFR BLD AUTO: 0.7 % — SIGNIFICANT CHANGE UP (ref 0–6)
GLUCOSE BLDC GLUCOMTR-MCNC: 131 MG/DL — HIGH (ref 70–99)
GLUCOSE SERPL-MCNC: 117 MG/DL — HIGH (ref 70–99)
GLUCOSE UR QL: NEGATIVE MG/DL — SIGNIFICANT CHANGE UP
HCT VFR BLD CALC: 24.3 % — LOW (ref 39–50)
HGB BLD-MCNC: 7.8 G/DL — LOW (ref 13–17)
HYALINE CASTS # UR AUTO: PRESENT
IANC: 7.22 K/UL — SIGNIFICANT CHANGE UP (ref 1.8–7.4)
IMM GRANULOCYTES NFR BLD AUTO: 1 % — HIGH (ref 0–0.9)
KETONES UR-MCNC: NEGATIVE MG/DL — SIGNIFICANT CHANGE UP
LEUKOCYTE ESTERASE UR-ACNC: NEGATIVE — SIGNIFICANT CHANGE UP
LYMPHOCYTES # BLD AUTO: 0.57 K/UL — LOW (ref 1–3.3)
LYMPHOCYTES # BLD AUTO: 6.6 % — LOW (ref 13–44)
MCHC RBC-ENTMCNC: 29.8 PG — SIGNIFICANT CHANGE UP (ref 27–34)
MCHC RBC-ENTMCNC: 32.1 GM/DL — SIGNIFICANT CHANGE UP (ref 32–36)
MCV RBC AUTO: 92.7 FL — SIGNIFICANT CHANGE UP (ref 80–100)
MONOCYTES # BLD AUTO: 0.67 K/UL — SIGNIFICANT CHANGE UP (ref 0–0.9)
MONOCYTES NFR BLD AUTO: 7.7 % — SIGNIFICANT CHANGE UP (ref 2–14)
NEUTROPHILS # BLD AUTO: 7.22 K/UL — SIGNIFICANT CHANGE UP (ref 1.8–7.4)
NEUTROPHILS NFR BLD AUTO: 83.4 % — HIGH (ref 43–77)
NITRITE UR-MCNC: NEGATIVE — SIGNIFICANT CHANGE UP
NRBC # BLD: 0 /100 WBCS — SIGNIFICANT CHANGE UP (ref 0–0)
NRBC # FLD: 0 K/UL — SIGNIFICANT CHANGE UP (ref 0–0)
NT-PROBNP SERPL-SCNC: HIGH PG/ML
PH UR: 5.5 — SIGNIFICANT CHANGE UP (ref 5–8)
PLATELET # BLD AUTO: 420 K/UL — HIGH (ref 150–400)
POTASSIUM SERPL-MCNC: 4.5 MMOL/L — SIGNIFICANT CHANGE UP (ref 3.5–5.3)
POTASSIUM SERPL-SCNC: 4.5 MMOL/L — SIGNIFICANT CHANGE UP (ref 3.5–5.3)
PROT ?TM UR-MCNC: 244 MG/DL — SIGNIFICANT CHANGE UP
PROT SERPL-MCNC: 6.1 G/DL — SIGNIFICANT CHANGE UP (ref 6–8.3)
PROT UR-MCNC: 300 MG/DL
PROT/CREAT UR-RTO: 2.8 RATIO — HIGH (ref 0–0.2)
RBC # BLD: 2.62 M/UL — LOW (ref 4.2–5.8)
RBC # FLD: 15.8 % — HIGH (ref 10.3–14.5)
RBC CASTS # UR COMP ASSIST: 3 /HPF — SIGNIFICANT CHANGE UP (ref 0–4)
REVIEW: SIGNIFICANT CHANGE UP
SODIUM SERPL-SCNC: 142 MMOL/L — SIGNIFICANT CHANGE UP (ref 135–145)
SP GR SPEC: 1.02 — SIGNIFICANT CHANGE UP (ref 1–1.03)
SQUAMOUS # UR AUTO: 0 /HPF — SIGNIFICANT CHANGE UP (ref 0–5)
UROBILINOGEN FLD QL: 0.2 MG/DL — SIGNIFICANT CHANGE UP (ref 0.2–1)
WBC # BLD: 8.66 K/UL — SIGNIFICANT CHANGE UP (ref 3.8–10.5)
WBC # FLD AUTO: 8.66 K/UL — SIGNIFICANT CHANGE UP (ref 3.8–10.5)
WBC UR QL: 0 /HPF — SIGNIFICANT CHANGE UP (ref 0–5)

## 2023-09-29 PROCEDURE — 93010 ELECTROCARDIOGRAM REPORT: CPT

## 2023-09-29 PROCEDURE — 93970 EXTREMITY STUDY: CPT | Mod: 26

## 2023-09-29 PROCEDURE — 99285 EMERGENCY DEPT VISIT HI MDM: CPT | Mod: FS

## 2023-09-29 PROCEDURE — 71046 X-RAY EXAM CHEST 2 VIEWS: CPT | Mod: 26

## 2023-09-29 PROCEDURE — 99223 1ST HOSP IP/OBS HIGH 75: CPT | Mod: GC

## 2023-09-29 PROCEDURE — 99223 1ST HOSP IP/OBS HIGH 75: CPT

## 2023-09-29 RX ORDER — ACETAMINOPHEN 500 MG
650 TABLET ORAL EVERY 6 HOURS
Refills: 0 | Status: DISCONTINUED | OUTPATIENT
Start: 2023-09-29 | End: 2023-10-03

## 2023-09-29 RX ORDER — BUMETANIDE 0.25 MG/ML
1 INJECTION INTRAMUSCULAR; INTRAVENOUS
Qty: 20 | Refills: 0 | Status: DISCONTINUED | OUTPATIENT
Start: 2023-09-29 | End: 2023-10-01

## 2023-09-29 RX ORDER — BUMETANIDE 0.25 MG/ML
1 INJECTION INTRAMUSCULAR; INTRAVENOUS ONCE
Refills: 0 | Status: DISCONTINUED | OUTPATIENT
Start: 2023-09-29 | End: 2023-09-29

## 2023-09-29 RX ORDER — TACROLIMUS 5 MG/1
1 CAPSULE ORAL
Refills: 0 | Status: DISCONTINUED | OUTPATIENT
Start: 2023-09-29 | End: 2023-10-03

## 2023-09-29 RX ORDER — TACROLIMUS 5 MG/1
1 CAPSULE ORAL
Refills: 0 | DISCHARGE

## 2023-09-29 RX ORDER — HEPARIN SODIUM 5000 [USP'U]/ML
5000 INJECTION INTRAVENOUS; SUBCUTANEOUS EVERY 12 HOURS
Refills: 0 | Status: DISCONTINUED | OUTPATIENT
Start: 2023-09-29 | End: 2023-09-29

## 2023-09-29 RX ORDER — APIXABAN 2.5 MG/1
2.5 TABLET, FILM COATED ORAL EVERY 12 HOURS
Refills: 0 | Status: DISCONTINUED | OUTPATIENT
Start: 2023-09-29 | End: 2023-09-29

## 2023-09-29 RX ORDER — TACROLIMUS 5 MG/1
1 CAPSULE ORAL
Refills: 0 | Status: DISCONTINUED | OUTPATIENT
Start: 2023-09-29 | End: 2023-09-29

## 2023-09-29 RX ORDER — PANTOPRAZOLE SODIUM 20 MG/1
40 TABLET, DELAYED RELEASE ORAL
Refills: 0 | Status: DISCONTINUED | OUTPATIENT
Start: 2023-09-29 | End: 2023-10-03

## 2023-09-29 RX ORDER — VERICIGUAT 2.5 MG/1
1 TABLET, FILM COATED ORAL
Refills: 0 | DISCHARGE

## 2023-09-29 RX ORDER — METOPROLOL TARTRATE 50 MG
25 TABLET ORAL DAILY
Refills: 0 | Status: DISCONTINUED | OUTPATIENT
Start: 2023-09-29 | End: 2023-10-02

## 2023-09-29 RX ORDER — BUMETANIDE 0.25 MG/ML
2 INJECTION INTRAMUSCULAR; INTRAVENOUS ONCE
Refills: 0 | Status: COMPLETED | OUTPATIENT
Start: 2023-09-29 | End: 2023-09-29

## 2023-09-29 RX ORDER — APIXABAN 2.5 MG/1
5 TABLET, FILM COATED ORAL EVERY 12 HOURS
Refills: 0 | Status: DISCONTINUED | OUTPATIENT
Start: 2023-09-29 | End: 2023-10-03

## 2023-09-29 RX ADMIN — TACROLIMUS 1 MILLIGRAM(S): 5 CAPSULE ORAL at 22:43

## 2023-09-29 RX ADMIN — BUMETANIDE 5 MG/HR: 0.25 INJECTION INTRAMUSCULAR; INTRAVENOUS at 18:03

## 2023-09-29 RX ADMIN — APIXABAN 5 MILLIGRAM(S): 2.5 TABLET, FILM COATED ORAL at 18:03

## 2023-09-29 RX ADMIN — BUMETANIDE 2 MILLIGRAM(S): 0.25 INJECTION INTRAMUSCULAR; INTRAVENOUS at 11:21

## 2023-09-29 RX ADMIN — Medication 25 MILLIGRAM(S): at 18:03

## 2023-09-29 NOTE — PATIENT PROFILE ADULT - NSPROGENBLOODRESTRICT_GEN_A_NUR
Patients mother left voice mail message cancelling today's session because she was called into work. Asad Glasgow.  Johnathon Sarmiento MA/CCC-SLP  [phone] none

## 2023-09-29 NOTE — ED PROVIDER NOTE - OBJECTIVE STATEMENT
67 y/o pmhx right knee replacement (3/2023), DVT (Eliquis) and anasarca (Glomerulonephritis nephrotic syndrome) presenting with b/l leg swelling more on the right side. 4 days ago pt got 1 infusion of Rituxan and afterward the swelling had gotten worst. Pt reports decrease in urine output. Pt has chronic leg swelling and is seeing a nephrologist (Dr. Green) who recommend him to come to [facility] to see Dr. Loaiza. Pt had similar symptoms back in July 2023 and was admitted here at [facility]. Was given a drip that helped. Pt reports limited of motion due to the swelling and is now using a cane to walk. Denies fever, chills, n/v, dysuria, hematuria, bowel changes, abdominal pain, chest pain, SOB, dizziness, diarrhea.

## 2023-09-29 NOTE — H&P ADULT - HISTORY OF PRESENT ILLNESS
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 Patient states that swelling has worsened over the last few days. He states that  Patient was sent in by outpatient nephrologist due to concern for  worsening swelling and for further workup.    In the ED, VSS. CXR with clear lungs; no consolidations or effusions noted. BNP 01684.   Recived bumex 2mg x1. Admitted to medicine for further workup.  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 Patient states that swelling has worsened over the last few days. He states that he received Rituxan on Tuesday and subsequently patient noted increased swelling in his legs bilaterally. He states that it has been more diffucult to walk due to heavy feeling in his legs. He denies any chest pain, fevers chills, SOB or PULIDO. Patient states that she has been complaint with his medications. Dnies any recent travel or sick contacts. Patient was sent in by outpatient nephrologist, , due to concern for  worsening swelling and for further workup.    In the ED, VSS. CXR with clear lungs; no consolidations or effusions noted. BNP 51446.   Received bumex 2mg x1. Admitted to medicine for further workup.

## 2023-09-29 NOTE — H&P ADULT - PROBLEM SELECTOR PLAN 1
- presents with bilateral LE swelling to mid thigh, BNP elevated >36K, ddx flare of nephrotic syndrome vs decompensate HF   - TTE with EF 29% with severe LV systolic dysfunction  - Doppler of b/l LEs to r/o DVT; patient has been taking DOAC since March for provoked DVT s/p knee replacement

## 2023-09-29 NOTE — H&P ADULT - NSHPPHYSICALEXAM_GEN_ALL_CORE
T(C): 36.7 (09-29-23 @ 09:39), Max: 36.7 (09-29-23 @ 09:39)  HR: 82 (09-29-23 @ 09:39) (82 - 82)  BP: 119/58 (09-29-23 @ 09:39) (119/58 - 119/58)  RR: 16 (09-29-23 @ 09:39) (16 - 16)  SpO2: 96% (09-29-23 @ 09:39) (96% - 96%)    CONSTITUTIONAL: Well groomed, no apparent distress  EYES: PERRLA and symmetric, EOMI, No conjunctival or scleral injection, non-icteric  ENMT: Oral mucosa with moist membranes. Normal dentition; no pharyngeal injection or exudates             NECK: Supple, symmetric and without tracheal deviation   RESP: No respiratory distress, no use of accessory muscles; CTA b/l, no WRR  CV: RRR, +S1S2, no MRG; no JVD; no peripheral edema  GI: Soft, NT, ND, no rebound, no guarding; no palpable masses; no hepatosplenomegaly; no hernia palpated  LYMPH: No cervical LAD or tenderness; no axillary LAD or tenderness; no inguinal LAD or tenderness  MSK: Normal gait; No digital clubbing or cyanosis; examination of the (head/neck/spine/ribs/pelvis, RUE, LUE, RLE, LLE) without misalignment,            Normal ROM without pain, no spinal tenderness, normal muscle strength/tone  SKIN: No rashes or ulcers noted; no subcutaneous nodules or induration palpable  NEURO: CN II-XII intact; normal reflexes in upper and lower extremities, sensation intact in upper and lower extremities b/l to light touch   PSYCH: Appropriate insight/judgment; A+O x 3, mood and affect appropriate, recent/remote memory intact T(C): 36.7 (09-29-23 @ 09:39), Max: 36.7 (09-29-23 @ 09:39)  HR: 82 (09-29-23 @ 09:39) (82 - 82)  BP: 119/58 (09-29-23 @ 09:39) (119/58 - 119/58)  RR: 16 (09-29-23 @ 09:39) (16 - 16)  SpO2: 96% (09-29-23 @ 09:39) (96% - 96%)    CONSTITUTIONAL: Well groomed, no apparent distress  EYES: No conjunctival or scleral injection, non-icteric  ENMT: Oral mucosa with moist membranes. Normal dentition; no pharyngeal injection or exudates  RESP: No respiratory distress, no use of accessory muscles; CTA b/l, no WRR  CV: RRR, +S1S2, no MRG; ; 3+ pitting edema to mid thigh bilaterally R>L  GI: Soft, NT, ND, no rebound, no guarding; no palpable masses  MSK: Normal ROM without pain, no spinal tenderness, normal muscle strength/tone  SKIN: No rashes or ulcers noted; no subcutaneous nodules or induration palpable  NEURO: CN II-XII intact; sensation intact in upper and lower extremities b/l to light touch, 3/5 strength in right LE (limited due to hip pain), 5/5 strength in left LE,    PSYCH: Appropriate insight/judgment; A+O x 3, mood and affect appropriate, recent/remote memory intact

## 2023-09-29 NOTE — CONSULT NOTE ADULT - ATTENDING COMMENTS
Pt. with FRANDY on CKD in setting of nephrotic syndrome/MURPHY. Scr elevated at 2.8 today. Assessment and plan for FRANDY/CKD and body/UE/LE edema as outlined above. Pt. with significant edema, recommend IV Bumex infusion (as outlined above). Low salt diet and fluid restriction. Monitor labs and urine output. Avoid any potential nephrotoxins. Dose medications as per eGFR.

## 2023-09-29 NOTE — ED PROVIDER NOTE - WR ORDER ID 1
Diabetes is worsening.  A1c just above goal.  CGM shows occ nocturnal hypoglycemia and mild postprandial spikes.  Continue current treatment regimen.  But decrease overnight basal rate.  Be more aggressive with boluses.  Diabetes will be reassessed in 3 months.   [id number]

## 2023-09-29 NOTE — H&P ADULT - PROBLEM SELECTOR PLAN 6
- hx of DVT March 2023 - hx of DVT March 2023 after knee replacement in [place]   - on apixaban 5mg BID, will continue

## 2023-09-29 NOTE — H&P ADULT - NSICDXPASTMEDICALHX_GEN_ALL_CORE_FT
PAST MEDICAL HISTORY:  Chronic GERD     Hyperlipidemia     Hypertension     Type 2 diabetes mellitus

## 2023-09-29 NOTE — H&P ADULT - PROBLEM SELECTOR PLAN 5
- Cr 2.8, previous Cr 1.5-1.6, likely elevated in setting of fluid overload   - continue to monitor BMP

## 2023-09-29 NOTE — H&P ADULT - PROBLEM SELECTOR PLAN 2
- hx of minimal change disease via biopsy, received Rituxan infusion as outpatient   - Nephrology following, appreciate rec  - continue with tacrolimus, recommending renal US to r/o renal vein thrombosis   - rest of plan as stated above

## 2023-09-29 NOTE — H&P ADULT - ASSESSMENT
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 Admitted to medicine for further management.

## 2023-09-29 NOTE — ED ADULT NURSE REASSESSMENT NOTE - NS ED NURSE REASSESS COMMENT FT1
received pt in CDU 7, A+OX4, ambulatory with cane and assistance. PERRLA and facial symmetry noted. pt denies numbness, tingling, and weakness in peripheral extremities. VSS, denies chest pain and SOB, RR even and unlabored. abdomen is flat, soft, nontender; denies nausea, vomiting, diarrhea, and constipation. pt complains of pain in B/L knees with activity. +2 edema noted right leg +3 edema noted to left leg. +2 pedal pulses noted. denies pain with urination, endorses episodes of hesitancy when urinating. skin is clean dry and intact. left Ac 20g noted, flushes well. pt is stable at this time.

## 2023-09-29 NOTE — H&P ADULT - PROBLEM SELECTOR PLAN 4
- Hgb 7.8 this AM, received Rituxan infusion on Tuesday 9/26, likely 2/2 Rituxan  - no active signs of bleeding, patient hemodynamically stable   - continue to monitor with daily CBC

## 2023-09-29 NOTE — ED PROVIDER NOTE - NS ED ATTENDING STATEMENT MOD
I have seen and examined this patient and fully participated in the care of this patient as the teaching attending.  The service was shared with the UMANG.  I reviewed and verified the documentation and independently performed the documented:

## 2023-09-29 NOTE — CONSULT NOTE ADULT - SUBJECTIVE AND OBJECTIVE BOX
[facility] DIVISION OF KIDNEY DISEASES AND HYPERTENSION -- [phone]  -- INITIAL CONSULT NOTE  --------------------------------------------------------------------------------    HPI: 69 yo M with h/o nephrotic syndrome in the setting of biopsy proven (~June 2021 at [facility]) minimal change disease, CKD, DVT, and R knee replacement was sent to the ER by outpatient nephrologist for worsening BL LE edema. Nephrology was consulted for management of nephrotic syndrome. Pt. with known history of minimal change disease, was initially diagnosed on kidney biopsy performed at [facility] in 2021. Pt. with 2 relapses while on prednisone in the past. Per pt., he. was diagnosed with MURPHY a month after receiving initial COVID vaccine and relapses occurred following COVID booster vaccines. Pt. was recently hospitalized at [facility] (7/7/23-7/14/23) for volume overload which improved with IV Bumex infusion. During hospital stay, pt. was initiated on Tacrolimus therapy. Pt. was discharged on PO Lasix and Metolazone. However, 2-3 days following hospital discharge, pt. developed edema. Scr on admission was elevated at 1.60 (7/7/23), and remained elevated/stable at 1.68 on hospital discharge (7/14/23). Scr initially during current hospital admission was elevated at 2.80 earlier today. Pt. received first dose of Rituxan on 9/26/23.    Pt. was seen and evaluated with family present in the ER today. Pt. reports decreased urine output, BL UE edema, BL LE edema, weakness, and decreased PO intake. Pt. reports that symptoms worsened following Rituxan infusion on Tuesday. Pt. also endorses R hip pain and R knee pain. Outpatient nephrologist is Dr. Leroy Quintero. Pt. was seen by nephrologist Dr. Oliva for initial evaluation on 8/24/23. Pt. denies any headaches, fevers/chills, chest pain, SOB, and abdominal pain.      PAST HISTORY  --------------------------------------------------------------------------------  PAST MEDICAL & SURGICAL HISTORY:  Hypertension  Hyperlipidemia  Chronic GERD  Type 2 diabetes mellitus    Status post right knee replacement      FAMILY HISTORY:  FH: hyperlipidemia (Father)      PAST SOCIAL HISTORY:    ALLERGIES & MEDICATIONS  --------------------------------------------------------------------------------  Allergies    No Known Allergies    Intolerances      Standing Inpatient Medications    PRN Inpatient Medications  acetaminophen     Tablet .. 650 milliGRAM(s) Oral every 6 hours PRN      REVIEW OF SYSTEMS  --------------------------------------------------------------------------------  Gen: +Generalized weakness  Skin: No rashes  Head/Eyes/Ears: No headache   Respiratory: No dyspnea, cough  CV: No chest pain  GI: +Decreased PO intake  : +Decreased urine output  MSK: +BL UE and LE edema, R knee pain, and R hip pain  Heme: No easy bruising or bleeding    All other systems were reviewed and are negative, except as noted.    VITALS/PHYSICAL EXAM  --------------------------------------------------------------------------------  T(C): 36.5 (09-29-23 @ 14:54), Max: 36.7 (09-29-23 @ 09:39)  HR: 78 (09-29-23 @ 14:54) (78 - 82)  BP: 137/89 (09-29-23 @ 14:54) (119/58 - 137/89)  RR: 16 (09-29-23 @ 14:54) (16 - 16)  SpO2: 98% (09-29-23 @ 14:54) (96% - 98%)  Wt(kg): --    Physical Exam:  Gen: NAD  HEENT: MMM  Pulm: CTA B/L  CV: S1S2  Abd: Soft, +BS   Ext: +BL UE and LE edema  Neuro: Awake and alert  Skin: Warm and dry  Vascular access: Peripheral IV line    LABS/STUDIES  --------------------------------------------------------------------------------              7.8    8.66  >-----------<  420      [09-29-23 @ 10:45]              24.3     142  |  103  |  104  ----------------------------<  117      [09-29-23 @ 10:45]  4.5   |  24  |  2.80        Ca     8.4     [09-29-23 @ 10:45]    TPro  6.1  /  Alb  3.3  /  TBili  <0.2  /  DBili  x   /  AST  18  /  ALT  14  /  AlkPhos  95  [09-29-23 @ 10:45]    Creatinine Trend:  SCr 2.80 [09-29 @ 10:45]    Urine Creatinine 89      [09-29-23 @ 10:45]  Urine Protein 244      [09-29-23 @ 10:45]    Iron 30, TIBC 150, %sat 20      [07-08-23 @ 06:40]  Ferritin 300      [07-08-23 @ 06:40]  Lipid: chol 319, , HDL 95, LDL --      [07-10-23 @ 05:50]    HCV 0.07, Nonreact      [07-08-23 @ 06:40] [facility] DIVISION OF KIDNEY DISEASES AND HYPERTENSION -- [phone]  -- INITIAL CONSULT NOTE  --------------------------------------------------------------------------------  HPI: 67 yo M with history of nephrotic syndrome from minimal change disease (MURPHY), CKD, DVT, and right knee replacement was sent to the ER by outpatient nephrologist Dr. Feliciano Oliva for increasing B/L LE edema. Pt. with known history of MURPHY, was diagnosed on kidney biopsy performed at [facility] in 2021. Pt. with 2 relapses while on prednisone in the past. Per pt., he. was diagnosed with MURPHY a month after receiving first COVID-19 vaccine and relapses occurred following 2nd and 3rd third COVID-19 shots. Pt. was hospitalized at [facility] in [place] 2023 (7/7/23-7/14/23) for volume overload which improved with IV Bumex infusion. During hospital stay, pt. was initiated on Tacrolimus therapy. Pt. was discharged on PO Lasix and Metolazone. Scr on admission was elevated at 1.60 (7/7/23), and remained elevated/stable at 1.68 on hospital discharge (7/14/23). Scr was 1.86 on 8/24/23, increased to 2.80 earlier today. Pt. received first dose of Rituxan on 9/26/23.    Pt. seen and evaluated with family present in the ER today. Pt. reports decreased urine output, B/L UE and LE edema, generalized weakness, and decreased PO intake. Pt. reports that symptoms worsened following Rituxan infusion on Tuesday. Pt. also endorses right hip and right knee pain. No fever, chest pain, SOB or abdominal pain.    PAST HISTORY  --------------------------------------------------------------------------------  PAST MEDICAL & SURGICAL HISTORY:  Hypertension  Hyperlipidemia  Chronic GERD  Type 2 diabetes mellitus    Status post right knee replacement    FAMILY HISTORY:  FH: hyperlipidemia (Father)    PAST SOCIAL HISTORY:    ALLERGIES & MEDICATIONS  --------------------------------------------------------------------------------  Allergies    No Known Allergies    Intolerances    Standing Inpatient Medications    PRN Inpatient Medications  acetaminophen     Tablet .. 650 milliGRAM(s) Oral every 6 hours PRN    REVIEW OF SYSTEMS  --------------------------------------------------------------------------------  Gen: +Generalized weakness  Skin: No rash  Head/Eyes/Ears: No headache   Respiratory: No dyspnea, cough  CV: No chest pain  GI: +Decreased PO intake  : +Decreased urine output  MSK: +B/L UE and LE edema, +R knee pain, +R hip pain  Heme: No easy bruising or bleeding    All other systems were reviewed and are negative, except as noted.    VITALS/PHYSICAL EXAM  --------------------------------------------------------------------------------  T(C): 36.5 (09-29-23 @ 14:54), Max: 36.7 (09-29-23 @ 09:39)  HR: 78 (09-29-23 @ 14:54) (78 - 82)  BP: 137/89 (09-29-23 @ 14:54) (119/58 - 137/89)  RR: 16 (09-29-23 @ 14:54) (16 - 16)  SpO2: 98% (09-29-23 @ 14:54) (96% - 98%)  Wt(kg): --    Physical Exam:  Gen: resting, NAD  HEENT: MMM  Pulm: CTA B/L  CV: S1S2+  Abd: Soft, +BS   Ext: +B/L UE and LE edema  Neuro: Awake and alert  Skin: Warm and dry  Vascular access: Peripheral IV line    LABS/STUDIES  --------------------------------------------------------------------------------              7.8    8.66  >-----------<  420      [09-29-23 @ 10:45]              24.3     142  |  103  |  104  ----------------------------<  117      [09-29-23 @ 10:45]  4.5   |  24  |  2.80        Ca     8.4     [09-29-23 @ 10:45]    TPro  6.1  /  Alb  3.3  /  TBili  <0.2  /  DBili  x   /  AST  18  /  ALT  14  /  AlkPhos  95  [09-29-23 @ 10:45]    Creatinine Trend:  SCr 2.80 [09-29 @ 10:45]    Urine Creatinine 89      [09-29-23 @ 10:45]  Urine Protein 244      [09-29-23 @ 10:45]    Iron 30, TIBC 150, %sat 20      [07-08-23 @ 06:40]  Ferritin 300      [07-08-23 @ 06:40]  Lipid: chol 319, , HDL 95, LDL --      [07-10-23 @ 05:50]    HCV 0.07, Nonreact      [07-08-23 @ 06:40]

## 2023-09-29 NOTE — H&P ADULT - NSHPLABSRESULTS_GEN_ALL_CORE
142  |  103  |  104<H>  ----------------------------<  117<H>  4.5   |  24  |  2.80<H>    Ca    8.4      29 Sep 2023 10:45    TPro  6.1  /  Alb  3.3  /  TBili  <0.2  /  DBili  x   /  AST  18  /  ALT  14  /  AlkPhos  95                      Urinalysis Basic - ( 29 Sep 2023 10:45 )    Color: Yellow / Appearance: Clear / S.017 / pH: x  Gluc: 117 mg/dL / Ketone: Negative mg/dL  / Bili: Negative / Urobili: 0.2 mg/dL   Blood: x / Protein: 300 mg/dL / Nitrite: Negative   Leuk Esterase: Negative / RBC: 3 /HPF / WBC 0 /HPF   Sq Epi: x / Non Sq Epi: 0 /HPF / Bacteria: Negative /HPF                              7.8    8.66  )-----------( 420      ( 29 Sep 2023 10:45 )             24.3     CAPILLARY BLOOD GLUCOSE        < from: Xray Chest 2 Views PA/Lat (23 @ 11:13) >      FINDINGS:  The lungs are clear. The heart is not enlarged and there is no effusion   or pneumothorax.        COMPARISON: 2023        IMPRESSION: Clear lungs.    < end of copied text >

## 2023-09-29 NOTE — H&P ADULT - NSHPREVIEWOFSYSTEMS_GEN_ALL_CORE
CONSTITUTIONAL: No fever, no chills  EYES: No eye pain, no acute blindness  Mouth: no pain in mouth, no cuts  RESPIRATORY: No cough, No sob  CARDIOVASCULAR: No CP, no palpitations  GASTROINTESTINAL: no abdominal pain, no n/v/d  GENITOURINARY: No dysuria, no hematuria  Heme: No easy bruising, no swelling of neck  NEUROLOGICAL: No seizure, No acute paralysis  SKIN: No itching, no rashes  MUSCULOSKELETAL: No acute joint pain, no joint swelling CONSTITUTIONAL: No fever, no chills  EYES: No eye pain, no acute blindness  Mouth: no pain in mouth, no cuts  RESPIRATORY: No cough, No sob  CARDIOVASCULAR: No CP, no palpitations, no LE edema   GASTROINTESTINAL: no abdominal pain, no n/v/d  GENITOURINARY: No dysuria, no hematuria  Heme: No easy bruising, no swelling of neck  NEUROLOGICAL: No seizure, No acute paralysis  SKIN: No itching, no rashes  MUSCULOSKELETAL: No acute joint pain, no joint swelling

## 2023-09-29 NOTE — ED PROVIDER NOTE - PROGRESS NOTE DETAILS
MATTHIAS Mendoza: Spoke to Nephrology, whom states that they will come and see the patient. Patient to be admitted to Dr. Bonilla. Patient given bumex, patient has a history of DVT, has right leg swelling on a DOAC.

## 2023-09-29 NOTE — ED ADULT NURSE NOTE - CHIEF COMPLAINT QUOTE
sent to ER for admission under MD Loaiza, pt c/o pain and swelling to right leg states needs fluid drained again, phx anasarca

## 2023-09-29 NOTE — ED PROVIDER NOTE - CLINICAL SUMMARY MEDICAL DECISION MAKING FREE TEXT BOX
69 y/o pmhx right knee replacement (3/2023), DVT (Eliquis) and anasarca (Glomerulonephritis nephrotic syndrome) presenting with b/l leg swelling more on the right side. 4 days ago pt got 1 infusion of Rituxan and afterward the swelling had gotten worst. Pt reports decrease in urine output.   Plan: CBC, CMP, UA, Bumex, consult nephrologist

## 2023-09-29 NOTE — CONSULT NOTE ADULT - PROBLEM SELECTOR RECOMMENDATION 9
Pt. with FRANDY on CKD in the setting of volume overload. Pt. with known h/o biopsy proven minimal change disease. Pt. was recently hospitalized at [facility] (7/7/23-7/14/23) for volume overload which improved with IV Bumex infusion. During hospital stay, pt. was initiated on Tacrolimus therapy. Pt. was discharged on PO Lasix and Metolazone. However, 2-3 days following hospital discharge, pt. developed edema. Scr on admission was elevated at 1.60 (7/7/23), and remained elevated/stable at 1.68 on hospital discharge (7/14/23). Scr initially during current hospital admission was elevated at 2.80 earlier today. Pt. received first dose of Rituxan on 9/26/23. UA shows proteinuria. UPCR elevated at 2.8 (improved from 6.5 on 8/24/23). Pt. is hypervolemic on exam. Labs reviewed. Serum albumin WNL at 3.3 today. Recommend IV Bumex infusion at 1 mg/hr for a total of 10 hours. Continue with Tacrolimus at current dose. Pt. to receive Rituxan in ~2 weeks as outpatient. Obtain renal US with doppler to evaluate for renal vein thrombosis. Monitor labs and urine output. Avoid nephrotoxins. Dose medications as per eGFR.     If you have any questions, please feel free to contact me  Alfonzo Argueta  Nephrology Fellow  [phone] / [employer] Teams(Preferred)  (After 5pm or on weekends please page the on-call fellow) Pt. with FRANDY on CKD in the setting of nephrotic syndrome/MURPHY. Pt. with known history of nephrotic syndrome from MURPHY (since 2021). Pt. was hospitalized at [facility] in July 2023 (7/7/23-7/14/23) for volume overload that improved with IV Bumex infusion. During hospital stay, pt. was initiated on Tacrolimus therapy. Pt. was discharged on PO Lasix and Metolazone. Scr on admission was elevated at 1.60 (7/7/23), and remained elevated/stable at 1.68 on hospital discharge (7/14/23). Scr was 1.86 on outpatient labs on 8/24/23, increased to 2.80 earlier today (9/29/23). Pt. received first dose of Rituxan on 9/26/23. UPCR elevated at 2.8 today (9/29/23). Of note, UPCR was elevated at 6.5 on 8/24/23. Serum albumin in low normal range at 3.3 today. Pt. with significant body/UE/LE edema on exam. Recommend IV Bumex infusion at 1 mg/hr for 10 hours. Continue with Tacrolimus at current dose. Pt. to receive Rituxan in ~2 weeks as outpatient. Obtain renal US with doppler to evaluate for renal vein thrombosis. Monitor labs and urine output. Avoid nephrotoxins. Dose medications as per eGFR.     If you have any questions, please feel free to contact me  Alfonzo Argueta  Nephrology Fellow  [phone] / [employer] Teams(Preferred)  (After 5pm or on weekends please page the on-call fellow)

## 2023-09-29 NOTE — PATIENT PROFILE ADULT - NSPROPASSIVESMOKEEXPOSURE_GEN_A_NUR
Additional Notes: - Continue eucrisa once daily as needed to affected areas of rash. \\n- Can continue Betamethasone lotion to affected areas on ears twice daily until resolved but no more than 14 days per month for flares. Detail Level: Simple Render Risk Assessment In Note?: no Unknown

## 2023-09-29 NOTE — H&P ADULT - TIME BILLING
Unknown Time-based billing (NON-critical care).     80 minutes spent on total encounter. The necessity of the time spent during the encounter on this date of service was due to:     Time spent on review of vitals, physical exam, documentation, and discussion of plan of care with patient and patient family.

## 2023-09-29 NOTE — ED PROVIDER NOTE - ATTENDING CONTRIBUTION TO CARE
Patient with complex medical history including heart failure, nephrotic syndrome received his first infusion of Rituxan 4 days ago, the next day started noticing increasing swelling, decreased urine output but still urinating, was referred to the emergency department by his physician for concern for fluid overload.  Nephrology consulted, patient is pending labs, chest x-ray, will give Bumex in the ED, and no current respiratory distress, speaking full sentences, no tachypnea or hypoxia, overall well-appearing 3+ bilateral pitting edema to lower extremities.

## 2023-09-29 NOTE — ED ADULT NURSE NOTE - OBJECTIVE STATEMENT
Pt a&ox3, in ED for swelling to b/l feet, legs and thighs, no SOB, unlabored breathing, equal rise & fall, able to speak in full sentences, no N/V/D, c/o right hip and right knee pain secondary to arthritis.

## 2023-09-29 NOTE — H&P ADULT - PROBLEM SELECTOR PLAN 7
- was previously on atorvastatin 20 mg, patient states no longer on this medications   - lipid profile in AM

## 2023-09-29 NOTE — PATIENT PROFILE ADULT - FALL HARM RISK - HARM RISK INTERVENTIONS

## 2023-09-30 DIAGNOSIS — E87.6 HYPOKALEMIA: ICD-10-CM

## 2023-09-30 LAB
ALBUMIN SERPL ELPH-MCNC: 3 G/DL — LOW (ref 3.3–5)
ALP SERPL-CCNC: 96 U/L — SIGNIFICANT CHANGE UP (ref 40–120)
ALT FLD-CCNC: 11 U/L — SIGNIFICANT CHANGE UP (ref 4–41)
ANION GAP SERPL CALC-SCNC: 14 MMOL/L — SIGNIFICANT CHANGE UP (ref 7–14)
AST SERPL-CCNC: 16 U/L — SIGNIFICANT CHANGE UP (ref 4–40)
BILIRUB SERPL-MCNC: 0.2 MG/DL — SIGNIFICANT CHANGE UP (ref 0.2–1.2)
BUN SERPL-MCNC: 98 MG/DL — HIGH (ref 7–23)
CALCIUM SERPL-MCNC: 9.3 MG/DL — SIGNIFICANT CHANGE UP (ref 8.4–10.5)
CHLORIDE SERPL-SCNC: 98 MMOL/L — SIGNIFICANT CHANGE UP (ref 98–107)
CHOLEST SERPL-MCNC: 231 MG/DL — HIGH
CO2 SERPL-SCNC: 28 MMOL/L — SIGNIFICANT CHANGE UP (ref 22–31)
CREAT SERPL-MCNC: 2.47 MG/DL — HIGH (ref 0.5–1.3)
CULTURE RESULTS: NO GROWTH — SIGNIFICANT CHANGE UP
EGFR: 28 ML/MIN/1.73M2 — LOW
GLUCOSE BLDC GLUCOMTR-MCNC: 135 MG/DL — HIGH (ref 70–99)
GLUCOSE SERPL-MCNC: 104 MG/DL — HIGH (ref 70–99)
HCT VFR BLD CALC: 27.4 % — LOW (ref 39–50)
HDLC SERPL-MCNC: 57 MG/DL — SIGNIFICANT CHANGE UP
HGB BLD-MCNC: 8.9 G/DL — LOW (ref 13–17)
LIPID PNL WITH DIRECT LDL SERPL: 141 MG/DL — HIGH
MCHC RBC-ENTMCNC: 30 PG — SIGNIFICANT CHANGE UP (ref 27–34)
MCHC RBC-ENTMCNC: 32.5 GM/DL — SIGNIFICANT CHANGE UP (ref 32–36)
MCV RBC AUTO: 92.3 FL — SIGNIFICANT CHANGE UP (ref 80–100)
MRSA PCR RESULT.: SIGNIFICANT CHANGE UP
NON HDL CHOLESTEROL: 174 MG/DL — HIGH
NRBC # BLD: 0 /100 WBCS — SIGNIFICANT CHANGE UP (ref 0–0)
NRBC # FLD: 0 K/UL — SIGNIFICANT CHANGE UP (ref 0–0)
PLATELET # BLD AUTO: 480 K/UL — HIGH (ref 150–400)
POTASSIUM SERPL-MCNC: 3.4 MMOL/L — LOW (ref 3.5–5.3)
POTASSIUM SERPL-SCNC: 3.4 MMOL/L — LOW (ref 3.5–5.3)
PROT SERPL-MCNC: 6.4 G/DL — SIGNIFICANT CHANGE UP (ref 6–8.3)
RBC # BLD: 2.97 M/UL — LOW (ref 4.2–5.8)
RBC # FLD: 15.7 % — HIGH (ref 10.3–14.5)
S AUREUS DNA NOSE QL NAA+PROBE: DETECTED
SODIUM SERPL-SCNC: 140 MMOL/L — SIGNIFICANT CHANGE UP (ref 135–145)
SPECIMEN SOURCE: SIGNIFICANT CHANGE UP
TACROLIMUS SERPL-MCNC: 3.6 NG/ML — SIGNIFICANT CHANGE UP
TRIGL SERPL-MCNC: 165 MG/DL — HIGH
WBC # BLD: 7.84 K/UL — SIGNIFICANT CHANGE UP (ref 3.8–10.5)
WBC # FLD AUTO: 7.84 K/UL — SIGNIFICANT CHANGE UP (ref 3.8–10.5)

## 2023-09-30 PROCEDURE — 99232 SBSQ HOSP IP/OBS MODERATE 35: CPT | Mod: GC

## 2023-09-30 RX ORDER — SODIUM CHLORIDE 9 MG/ML
1000 INJECTION, SOLUTION INTRAVENOUS
Refills: 0 | Status: DISCONTINUED | OUTPATIENT
Start: 2023-09-30 | End: 2023-10-03

## 2023-09-30 RX ORDER — DEXTROSE 50 % IN WATER 50 %
25 SYRINGE (ML) INTRAVENOUS ONCE
Refills: 0 | Status: DISCONTINUED | OUTPATIENT
Start: 2023-09-30 | End: 2023-10-03

## 2023-09-30 RX ORDER — MUPIROCIN 20 MG/G
1 OINTMENT TOPICAL
Refills: 0 | Status: DISCONTINUED | OUTPATIENT
Start: 2023-09-30 | End: 2023-10-03

## 2023-09-30 RX ORDER — INSULIN LISPRO 100/ML
VIAL (ML) SUBCUTANEOUS AT BEDTIME
Refills: 0 | Status: DISCONTINUED | OUTPATIENT
Start: 2023-09-30 | End: 2023-09-30

## 2023-09-30 RX ORDER — GLUCAGON INJECTION, SOLUTION 0.5 MG/.1ML
1 INJECTION, SOLUTION SUBCUTANEOUS ONCE
Refills: 0 | Status: DISCONTINUED | OUTPATIENT
Start: 2023-09-30 | End: 2023-10-03

## 2023-09-30 RX ORDER — DEXTROSE 50 % IN WATER 50 %
15 SYRINGE (ML) INTRAVENOUS ONCE
Refills: 0 | Status: DISCONTINUED | OUTPATIENT
Start: 2023-09-30 | End: 2023-10-03

## 2023-09-30 RX ORDER — CHLORHEXIDINE GLUCONATE 213 G/1000ML
1 SOLUTION TOPICAL DAILY
Refills: 0 | Status: DISCONTINUED | OUTPATIENT
Start: 2023-09-30 | End: 2023-10-03

## 2023-09-30 RX ORDER — INSULIN LISPRO 100/ML
VIAL (ML) SUBCUTANEOUS
Refills: 0 | Status: DISCONTINUED | OUTPATIENT
Start: 2023-09-30 | End: 2023-09-30

## 2023-09-30 RX ORDER — DEXTROSE 50 % IN WATER 50 %
12.5 SYRINGE (ML) INTRAVENOUS ONCE
Refills: 0 | Status: DISCONTINUED | OUTPATIENT
Start: 2023-09-30 | End: 2023-10-03

## 2023-09-30 RX ORDER — POTASSIUM CHLORIDE 20 MEQ
40 PACKET (EA) ORAL ONCE
Refills: 0 | Status: COMPLETED | OUTPATIENT
Start: 2023-09-30 | End: 2023-09-30

## 2023-09-30 RX ADMIN — TACROLIMUS 1 MILLIGRAM(S): 5 CAPSULE ORAL at 17:27

## 2023-09-30 RX ADMIN — Medication 650 MILLIGRAM(S): at 10:09

## 2023-09-30 RX ADMIN — CHLORHEXIDINE GLUCONATE 1 APPLICATION(S): 213 SOLUTION TOPICAL at 17:30

## 2023-09-30 RX ADMIN — APIXABAN 5 MILLIGRAM(S): 2.5 TABLET, FILM COATED ORAL at 17:28

## 2023-09-30 RX ADMIN — TACROLIMUS 1 MILLIGRAM(S): 5 CAPSULE ORAL at 06:32

## 2023-09-30 RX ADMIN — Medication 650 MILLIGRAM(S): at 11:09

## 2023-09-30 RX ADMIN — MUPIROCIN 1 APPLICATION(S): 20 OINTMENT TOPICAL at 17:28

## 2023-09-30 RX ADMIN — PANTOPRAZOLE SODIUM 40 MILLIGRAM(S): 20 TABLET, DELAYED RELEASE ORAL at 06:31

## 2023-09-30 RX ADMIN — APIXABAN 5 MILLIGRAM(S): 2.5 TABLET, FILM COATED ORAL at 06:32

## 2023-09-30 RX ADMIN — Medication 25 MILLIGRAM(S): at 06:32

## 2023-09-30 RX ADMIN — Medication 40 MILLIEQUIVALENT(S): at 17:27

## 2023-09-30 NOTE — CONSULT NOTE ADULT - SUBJECTIVE AND OBJECTIVE BOX
Andrea Otero MD  Interventional Cardiology / Endovascular Specialist  [place] Office : [phone] [place] [address] [place] [address]  Tel:   [place] Office [facility] [phone] [facility] [place] 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 Patient states that swelling has worsened over the last few days. He states that he received Rituxan on Tuesday and subsequently patient noted increased swelling in his legs bilaterally. He states that it has been more diffucult to walk due to heavy feeling in his legs. He denies any chest pain, fevers chills, SOB or PULIDO. Patient states that she has been complaint with his medications. Dnies any recent travel or sick contacts. Patient was sent in by outpatient nephrologist, , due to concern for  worsening swelling and for further workup.    In the ED, VSS. CXR with clear lungs; no consolidations or effusions noted. BNP 67273.   Received bumex 2mg x1. Admitted to medicine for further workup.       PAST MEDICAL & SURGICAL HISTORY:  Hypertension      Hyperlipidemia      Chronic GERD      Type 2 diabetes mellitus      Status post right knee replacement          SOCIAL HISTORY: Substance Use (street drugs): ( x ) never used  (  ) other:    FAMILY HISTORY:  FH: hyperlipidemia (Father)        REVIEW OF SYSTEMS:  CONSTITUTIONAL: No fever, weight loss, or fatigue  EYES: No eye pain, visual disturbances, or discharge  ENMT:  No difficulty hearing, tinnitus, vertigo; No sinus or throat pain  BREASTS: No pain, masses, or nipple discharge  GASTROINTESTINAL: No abdominal or epigastric pain. No nausea, vomiting, or hematemesis; No diarrhea or constipation. No melena or hematochezia.  GENITOURINARY: No dysuria, frequency, hematuria, or incontinence  NEUROLOGICAL: No headaches, memory loss, loss of strength, numbness, or tremors  ENDOCRINE: No heat or cold intolerance; No hair loss  MUSCULOSKELETAL: No joint pain or swelling; No muscle, back, or extremity pain  PSYCHIATRIC: No depression, anxiety, mood swings, or difficulty sleeping  HEME/LYMPH: No easy bruising, or bleeding gums  All others negative    MEDICATIONS:  apixaban 5 milliGRAM(s) Oral every 12 hours  buMETAnide Infusion 1 mG/Hr IV Continuous <Continuous>  metoprolol succinate ER 25 milliGRAM(s) Oral daily        acetaminophen     Tablet .. 650 milliGRAM(s) Oral every 6 hours PRN    pantoprazole    Tablet 40 milliGRAM(s) Oral before breakfast    dextrose 50% Injectable 12.5 Gram(s) IV Push once  dextrose 50% Injectable 25 Gram(s) IV Push once  dextrose 50% Injectable 25 Gram(s) IV Push once  dextrose Oral Gel 15 Gram(s) Oral once PRN  glucagon  Injectable 1 milliGRAM(s) IntraMuscular once    chlorhexidine 2% Cloths 1 Application(s) Topical daily  dextrose 5%. 1000 milliLiter(s) IV Continuous <Continuous>  dextrose 5%. 1000 milliLiter(s) IV Continuous <Continuous>  mupirocin 2% Ointment 1 Application(s) Topical two times a day  tacrolimus 1 milliGRAM(s) Oral two times a day      FAMILY HISTORY:  FH: hyperlipidemia (Father)          Allergies    No Known Allergies    Intolerances    	      PHYSICAL EXAM:  T(C): 36.9 (10-01-23 @ 05:50), Max: 37 (09-30-23 @ 19:45)  HR: 73 (10-01-23 @ 05:50) (69 - 73)  BP: 130/95 (10-01-23 @ 05:50) (127/83 - 130/95)  RR: 17 (10-01-23 @ 05:50) (16 - 18)  SpO2: 100% (10-01-23 @ 05:50) (100% - 100%)  Wt(kg): --  I&O's Summary    30 Sep 2023 07:01  -  01 Oct 2023 07:00  --------------------------------------------------------  IN: 200 mL / OUT: 2050 mL / NET: -1850 mL        GENERAL: NAD  EYES: conjunctiva and sclera clear  ENMT: No tonsillar erythema, exudates, or enlargement  Cardiovascular: Normal S1 S2, No JVD, No murmurs, No edema  Respiratory: Lungs clear to auscultation	  Gastrointestinal:  Soft, Non-tender, + BS	  Extremities: 1+ edema b/l         LABS:	 	    CARDIAC MARKERS:                                  9.0    7.05  )-----------( 493      ( 01 Oct 2023 04:40 )             29.0     10-01    143  |  99  |  82<H>  ----------------------------<  93  4.4   |  29  |  2.03<H>    Ca    9.3      01 Oct 2023 04:40    TPro  6.2  /  Alb  2.8<L>  /  TBili  <0.2  /  DBili  x   /  AST  13  /  ALT  9   /  AlkPhos  93  10-01    proBNP:   Lipid Profile:   HgA1c:   TSH:     Consultant(s) Notes Reviewed:  [x ] YES  [ ] NO    Care Discussed with Consultants/Other Providers [ x] YES  [ ] NO    Imaging Personally Reviewed independently:  [x] YES  [ ] NO    All labs, radiologic studies, vitals, orders and medications list reviewed. Patient is seen and examined at bedside. Case discussed with medical team.    ASSESSMENT/PLAN:

## 2023-09-30 NOTE — CONSULT NOTE ADULT - ASSESSMENT
Assessment and Plan     1) Acute on chronic CHF c/w Bumex drip , can switch to 2 IV BID from tomorrow repeat echo , if LV <35 EP eval for aicd     2) CKD f/u renal recs     3)DVT PPX lovenox

## 2023-10-01 LAB
A1C WITH ESTIMATED AVERAGE GLUCOSE RESULT: 5.2 % — SIGNIFICANT CHANGE UP (ref 4–5.6)
ALBUMIN SERPL ELPH-MCNC: 2.8 G/DL — LOW (ref 3.3–5)
ALP SERPL-CCNC: 93 U/L — SIGNIFICANT CHANGE UP (ref 40–120)
ALT FLD-CCNC: 9 U/L — SIGNIFICANT CHANGE UP (ref 4–41)
ANION GAP SERPL CALC-SCNC: 15 MMOL/L — HIGH (ref 7–14)
ANION GAP SERPL CALC-SCNC: 8 MMOL/L — SIGNIFICANT CHANGE UP (ref 7–14)
AST SERPL-CCNC: 13 U/L — SIGNIFICANT CHANGE UP (ref 4–40)
BILIRUB SERPL-MCNC: <0.2 MG/DL — SIGNIFICANT CHANGE UP (ref 0.2–1.2)
BUN SERPL-MCNC: 78 MG/DL — HIGH (ref 7–23)
BUN SERPL-MCNC: 82 MG/DL — HIGH (ref 7–23)
CALCIUM SERPL-MCNC: 9.3 MG/DL — SIGNIFICANT CHANGE UP (ref 8.4–10.5)
CALCIUM SERPL-MCNC: 9.3 MG/DL — SIGNIFICANT CHANGE UP (ref 8.4–10.5)
CHLORIDE SERPL-SCNC: 102 MMOL/L — SIGNIFICANT CHANGE UP (ref 98–107)
CHLORIDE SERPL-SCNC: 99 MMOL/L — SIGNIFICANT CHANGE UP (ref 98–107)
CO2 SERPL-SCNC: 29 MMOL/L — SIGNIFICANT CHANGE UP (ref 22–31)
CO2 SERPL-SCNC: 32 MMOL/L — HIGH (ref 22–31)
CREAT SERPL-MCNC: 2.03 MG/DL — HIGH (ref 0.5–1.3)
CREAT SERPL-MCNC: 2.04 MG/DL — HIGH (ref 0.5–1.3)
EGFR: 35 ML/MIN/1.73M2 — LOW
EGFR: 35 ML/MIN/1.73M2 — LOW
ESTIMATED AVERAGE GLUCOSE: 103 — SIGNIFICANT CHANGE UP
GLUCOSE SERPL-MCNC: 104 MG/DL — HIGH (ref 70–99)
GLUCOSE SERPL-MCNC: 93 MG/DL — SIGNIFICANT CHANGE UP (ref 70–99)
HCT VFR BLD CALC: 29 % — LOW (ref 39–50)
HGB BLD-MCNC: 9 G/DL — LOW (ref 13–17)
MAGNESIUM SERPL-MCNC: 2.7 MG/DL — HIGH (ref 1.6–2.6)
MCHC RBC-ENTMCNC: 28.9 PG — SIGNIFICANT CHANGE UP (ref 27–34)
MCHC RBC-ENTMCNC: 31 GM/DL — LOW (ref 32–36)
MCV RBC AUTO: 93.2 FL — SIGNIFICANT CHANGE UP (ref 80–100)
NRBC # BLD: 0 /100 WBCS — SIGNIFICANT CHANGE UP (ref 0–0)
NRBC # FLD: 0 K/UL — SIGNIFICANT CHANGE UP (ref 0–0)
PHOSPHATE SERPL-MCNC: 4.4 MG/DL — SIGNIFICANT CHANGE UP (ref 2.5–4.5)
PLATELET # BLD AUTO: 493 K/UL — HIGH (ref 150–400)
POTASSIUM SERPL-MCNC: 4.4 MMOL/L — SIGNIFICANT CHANGE UP (ref 3.5–5.3)
POTASSIUM SERPL-MCNC: 5.1 MMOL/L — SIGNIFICANT CHANGE UP (ref 3.5–5.3)
POTASSIUM SERPL-SCNC: 4.4 MMOL/L — SIGNIFICANT CHANGE UP (ref 3.5–5.3)
POTASSIUM SERPL-SCNC: 5.1 MMOL/L — SIGNIFICANT CHANGE UP (ref 3.5–5.3)
PROT SERPL-MCNC: 6.2 G/DL — SIGNIFICANT CHANGE UP (ref 6–8.3)
RBC # BLD: 3.11 M/UL — LOW (ref 4.2–5.8)
RBC # FLD: 15.9 % — HIGH (ref 10.3–14.5)
SODIUM SERPL-SCNC: 142 MMOL/L — SIGNIFICANT CHANGE UP (ref 135–145)
SODIUM SERPL-SCNC: 143 MMOL/L — SIGNIFICANT CHANGE UP (ref 135–145)
WBC # BLD: 7.05 K/UL — SIGNIFICANT CHANGE UP (ref 3.8–10.5)
WBC # FLD AUTO: 7.05 K/UL — SIGNIFICANT CHANGE UP (ref 3.8–10.5)

## 2023-10-01 PROCEDURE — 99232 SBSQ HOSP IP/OBS MODERATE 35: CPT | Mod: GC

## 2023-10-01 RX ORDER — BUMETANIDE 0.25 MG/ML
2 INJECTION INTRAMUSCULAR; INTRAVENOUS
Refills: 0 | Status: DISCONTINUED | OUTPATIENT
Start: 2023-10-01 | End: 2023-10-02

## 2023-10-01 RX ADMIN — APIXABAN 5 MILLIGRAM(S): 2.5 TABLET, FILM COATED ORAL at 06:55

## 2023-10-01 RX ADMIN — BUMETANIDE 2 MILLIGRAM(S): 0.25 INJECTION INTRAMUSCULAR; INTRAVENOUS at 19:58

## 2023-10-01 RX ADMIN — MUPIROCIN 1 APPLICATION(S): 20 OINTMENT TOPICAL at 06:56

## 2023-10-01 RX ADMIN — TACROLIMUS 1 MILLIGRAM(S): 5 CAPSULE ORAL at 06:55

## 2023-10-01 RX ADMIN — Medication 25 MILLIGRAM(S): at 06:55

## 2023-10-01 RX ADMIN — MUPIROCIN 1 APPLICATION(S): 20 OINTMENT TOPICAL at 17:19

## 2023-10-01 RX ADMIN — TACROLIMUS 1 MILLIGRAM(S): 5 CAPSULE ORAL at 17:19

## 2023-10-01 RX ADMIN — PANTOPRAZOLE SODIUM 40 MILLIGRAM(S): 20 TABLET, DELAYED RELEASE ORAL at 06:55

## 2023-10-01 RX ADMIN — APIXABAN 5 MILLIGRAM(S): 2.5 TABLET, FILM COATED ORAL at 17:19

## 2023-10-01 RX ADMIN — Medication 650 MILLIGRAM(S): at 21:31

## 2023-10-01 RX ADMIN — Medication 650 MILLIGRAM(S): at 22:05

## 2023-10-02 LAB
ALBUMIN SERPL ELPH-MCNC: 3.2 G/DL — LOW (ref 3.3–5)
ALP SERPL-CCNC: 92 U/L — SIGNIFICANT CHANGE UP (ref 40–120)
ALT FLD-CCNC: 9 U/L — SIGNIFICANT CHANGE UP (ref 4–41)
ANION GAP SERPL CALC-SCNC: 10 MMOL/L — SIGNIFICANT CHANGE UP (ref 7–14)
AST SERPL-CCNC: 13 U/L — SIGNIFICANT CHANGE UP (ref 4–40)
BILIRUB SERPL-MCNC: 0.2 MG/DL — SIGNIFICANT CHANGE UP (ref 0.2–1.2)
BUN SERPL-MCNC: 74 MG/DL — HIGH (ref 7–23)
CALCIUM SERPL-MCNC: 9.4 MG/DL — SIGNIFICANT CHANGE UP (ref 8.4–10.5)
CHLORIDE SERPL-SCNC: 99 MMOL/L — SIGNIFICANT CHANGE UP (ref 98–107)
CO2 SERPL-SCNC: 31 MMOL/L — SIGNIFICANT CHANGE UP (ref 22–31)
CREAT SERPL-MCNC: 2.06 MG/DL — HIGH (ref 0.5–1.3)
EGFR: 35 ML/MIN/1.73M2 — LOW
GLUCOSE SERPL-MCNC: 98 MG/DL — SIGNIFICANT CHANGE UP (ref 70–99)
HCT VFR BLD CALC: 31.1 % — LOW (ref 39–50)
HGB BLD-MCNC: 9.7 G/DL — LOW (ref 13–17)
MAGNESIUM SERPL-MCNC: 2.7 MG/DL — HIGH (ref 1.6–2.6)
MCHC RBC-ENTMCNC: 29.5 PG — SIGNIFICANT CHANGE UP (ref 27–34)
MCHC RBC-ENTMCNC: 31.2 GM/DL — LOW (ref 32–36)
MCV RBC AUTO: 94.5 FL — SIGNIFICANT CHANGE UP (ref 80–100)
NRBC # BLD: 0 /100 WBCS — SIGNIFICANT CHANGE UP (ref 0–0)
NRBC # FLD: 0 K/UL — SIGNIFICANT CHANGE UP (ref 0–0)
PLATELET # BLD AUTO: 513 K/UL — HIGH (ref 150–400)
POTASSIUM SERPL-MCNC: 3.8 MMOL/L — SIGNIFICANT CHANGE UP (ref 3.5–5.3)
POTASSIUM SERPL-SCNC: 3.8 MMOL/L — SIGNIFICANT CHANGE UP (ref 3.5–5.3)
PROT SERPL-MCNC: 6.4 G/DL — SIGNIFICANT CHANGE UP (ref 6–8.3)
RBC # BLD: 3.29 M/UL — LOW (ref 4.2–5.8)
RBC # FLD: 15.8 % — HIGH (ref 10.3–14.5)
SODIUM SERPL-SCNC: 140 MMOL/L — SIGNIFICANT CHANGE UP (ref 135–145)
WBC # BLD: 8.16 K/UL — SIGNIFICANT CHANGE UP (ref 3.8–10.5)
WBC # FLD AUTO: 8.16 K/UL — SIGNIFICANT CHANGE UP (ref 3.8–10.5)

## 2023-10-02 PROCEDURE — 99232 SBSQ HOSP IP/OBS MODERATE 35: CPT | Mod: GC

## 2023-10-02 PROCEDURE — 93975 VASCULAR STUDY: CPT | Mod: 26

## 2023-10-02 RX ORDER — BUMETANIDE 0.25 MG/ML
1 INJECTION INTRAMUSCULAR; INTRAVENOUS
Refills: 0 | Status: DISCONTINUED | OUTPATIENT
Start: 2023-10-02 | End: 2023-10-03

## 2023-10-02 RX ORDER — METOPROLOL TARTRATE 50 MG
50 TABLET ORAL DAILY
Refills: 0 | Status: DISCONTINUED | OUTPATIENT
Start: 2023-10-03 | End: 2023-10-03

## 2023-10-02 RX ADMIN — APIXABAN 5 MILLIGRAM(S): 2.5 TABLET, FILM COATED ORAL at 06:47

## 2023-10-02 RX ADMIN — TACROLIMUS 1 MILLIGRAM(S): 5 CAPSULE ORAL at 06:47

## 2023-10-02 RX ADMIN — BUMETANIDE 1 MILLIGRAM(S): 0.25 INJECTION INTRAMUSCULAR; INTRAVENOUS at 16:00

## 2023-10-02 RX ADMIN — TACROLIMUS 1 MILLIGRAM(S): 5 CAPSULE ORAL at 17:42

## 2023-10-02 RX ADMIN — Medication 25 MILLIGRAM(S): at 06:47

## 2023-10-02 RX ADMIN — APIXABAN 5 MILLIGRAM(S): 2.5 TABLET, FILM COATED ORAL at 17:42

## 2023-10-02 RX ADMIN — MUPIROCIN 1 APPLICATION(S): 20 OINTMENT TOPICAL at 16:00

## 2023-10-02 RX ADMIN — MUPIROCIN 1 APPLICATION(S): 20 OINTMENT TOPICAL at 07:00

## 2023-10-02 RX ADMIN — PANTOPRAZOLE SODIUM 40 MILLIGRAM(S): 20 TABLET, DELAYED RELEASE ORAL at 06:48

## 2023-10-02 RX ADMIN — BUMETANIDE 2 MILLIGRAM(S): 0.25 INJECTION INTRAMUSCULAR; INTRAVENOUS at 06:47

## 2023-10-02 NOTE — PROGRESS NOTE ADULT - PROBLEM SELECTOR PLAN 3
- EF 29% 7/2023 with severe LV systolic dysfunction, on Lasix 40 mg BID at home; noted to have 3+ pitting edema to mid thigh, BNP >36K (previously >14K)  - diuresis as baove

## 2023-10-02 NOTE — PROGRESS NOTE ADULT - PROBLEM SELECTOR PLAN 2
Hypokalemia in the setting of IV diuretic use. Serum K 3.4 today. Received potassium repletion. Continue to monitor labs    Final recommendations pending attending signature.    If you have any questions, please feel free to contact me  Nathalia Gutierrez  Nephrology Fellow  Gangkr/Page [phone]  (After 5pm or on weekends please page the on-call fellow)
Resolvd. Continue to monitor labs      If you have any questions, please feel free to contact me.  Venkat Nickerson  Nephrology Fellow  [id number] / [employer] Teams (Preferred)  (After 4pm or on weekends, please call the on-call Fellow)
-  Pt. with FRANDY on CKD in the setting of nephrotic syndrome/MURPHY. Pt. with known history of nephrotic syndrome from MURPHY (since 2021). Pt. was hospitalized at [facility] in July 2023 (7/7/23-7/14/23) for volume overload that improved with IV Bumex infusion. During hospital stay, pt. was initiated on Tacrolimus therapy. Pt. was discharged on PO Lasix and Metolazone. Scr on admission was elevated at 1.60 (7/7/23), and remained elevated/stable at 1.68 on hospital discharge (7/14/23)  cont bumex as per Renal
-  Pt. with FRANDY on CKD in the setting of nephrotic syndrome/MURPHY. Pt. with known history of nephrotic syndrome from MURPHY (since 2021). Pt. was hospitalized at [facility] in July 2023 (7/7/23-7/14/23) for volume overload that improved with IV Bumex infusion. During hospital stay, pt. was initiated on Tacrolimus therapy. Pt. was discharged on PO Lasix and Metolazone. Scr on admission was elevated at 1.60 (7/7/23), and remained elevated/stable at 1.68 on hospital discharge (7/14/23)  cont bumex as per Renal  improving volume status
Hypokalemia in the setting of IV diuretic use. Now resolved. Serum K 4.4 today. Continue to monitor labs    Final recommendations pending attending signature.    If you have any questions, please feel free to contact me  Nathalia Gutierrez  Nephrology Fellow  Spott[place]/Page [phone]  (After 5pm or on weekends please page the on-call fellow)
-  Pt. with FRANDY on CKD in the setting of nephrotic syndrome/MURPHY. Pt. with known history of nephrotic syndrome from MURPHY (since 2021). Pt. was hospitalized at [facility] in July 2023 (7/7/23-7/14/23) for volume overload that improved with IV Bumex infusion. During hospital stay, pt. was initiated on Tacrolimus therapy. Pt. was discharged on PO Lasix and Metolazone. Scr on admission was elevated at 1.60 (7/7/23), and remained elevated/stable at 1.68 on hospital discharge (7/14/23)  cont bumex as per Renal

## 2023-10-02 NOTE — PROGRESS NOTE ADULT - ATTENDING COMMENTS
1. FRANDY on CKD - patient with MURPHY, received Rituxan and on Tacrolimus with trough as noted above.  Creatinine improved to 2.47.  Urine protein 2.8 gm/day.  Continue to monitor daily labs.  2. Fluid overload - continue with Bumex (see above)  3. Hypokalemia - oral repletion ordered.  Add magnesium level to 10/1 am labs.  4. Anemia - hemoglobin somewhat improved with diuresis and hemoconcentration.  Repeat CBC 10/1.
1. FRANDY on CKD - creatinine improved as noted above.    2. Fluid overload - Bumex can be changed to 2 mg IV BID as noted above.  Can consider change to oral when clinically appropriate.   3. Hypokalemia - improved.  4. Anemia - monitor hemoglobin levels.  See above recommendations.
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  Feeling improved with diuresis  1.  Minimal change disease-- rx including prograf prograf.  Check levels for 3 toxicity    2.  Volume overload--appreciate cardio input.  Likely renal venous congestion improved by robust diuresis.  Trend BNP  3.  ARF--diuresis.  Would avoid RAASi at present.  Check renal venous dopplers    discussed with med team  Pepe Billings MD  contact me on TEAMS

## 2023-10-02 NOTE — PROGRESS NOTE ADULT - PROBLEM SELECTOR PLAN 1
Pt. with FRANDY on CKD in the setting of nephrotic syndrome/MURPHY. Pt. with known history of nephrotic syndrome from MURPHY (since 2021). Pt. was hospitalized at [facility] in July 2023 (7/7/23-7/14/23) for volume overload that improved with IV Bumex infusion. During hospital stay, pt. was initiated on Tacrolimus therapy. Pt. was discharged on PO Lasix and Metolazone. Scr on admission was elevated at 1.60 (7/7/23), and remained elevated/stable at 1.68 on hospital discharge (7/14/23). Scr was 1.86 on outpatient labs on 8/24/23, increased to 2.80 earlier today (9/29/23). Pt. received first dose of Rituxan on 9/26/23. UPCR elevated at 2.8 on 9/29/23. Of note, UPCR was elevated at 6.5 on 8/24/23. Serum albumin in low normal range at 3.0 today. Pt. with body/UE/LE edema on exam. Recommend to continue IV Bumex infusion at 1 mg/hr today. Continue with Tacrolimus at current dose (last tacrolimus trough 3.6 on 9/30/23). Pt. to receive Rituxan in ~2 weeks as outpatient. Obtain renal US with doppler to evaluate for renal vein thrombosis. Monitor labs and urine output. Avoid nephrotoxins. Dose medications as per eGFR.
- presents with bilateral LE swelling to mid thigh, BNP elevated >36K, ddx flare of nephrotic syndrome vs decompensate HF   - TTE with EF 29% with severe LV systolic dysfunction  - < from: US Duplex Venous Lower Ext Complete, Bilateral (09.29.23 @ 17:05) >    No evidence of deep venous thrombosis in either lower extremity.    Increased lower extremity venous pulsatility, which may be seen with   elevated right heart pressure.
Pt. with FRANDY on CKD in the setting of nephrotic syndrome/MURPHY. Pt. with known history of nephrotic syndrome from MURPHY (since 2021). Pt. was hospitalized at [facility] in July 2023 (7/7/23-7/14/23) for volume overload that improved with IV Bumex infusion. During hospital stay, pt. was initiated on Tacrolimus therapy. Pt. was discharged on PO Lasix and Metolazone. Scr on admission was elevated at 1.60 (7/7/23), and remained elevated/stable at 1.68 on hospital discharge (7/14/23). Scr was 1.86 on outpatient labs on 8/24/23, increased to 2.80 (9/29/23). Today Scr improved to 2.03. Pt. received first dose of Rituxan on 9/26/23. UPCR elevated at 2.8 on 9/29/23. Of note, UPCR was elevated at 6.5 on 8/24/23. Serum albumin in low at 2.8 today. Can switch to Bumex 2mg IV BID as per cardiology. Continue with Tacrolimus at current dose (last tacrolimus trough 3.6 on 9/30/23). Pt. to receive Rituxan in ~2 weeks as outpatient. Obtain renal US with doppler to evaluate for renal vein thrombosis. Monitor labs and urine output. Avoid nephrotoxins. Dose medications as per eGFR.
Pt. with FRANDY on CKD in the setting of nephrotic syndrome/MURPHY. Pt. with known history of nephrotic syndrome from MURPHY (since 2021). Pt. was hospitalized at [facility] in July 2023 (7/7/23-7/14/23) for volume overload that improved with IV Bumex infusion. During hospital stay, pt. was initiated on Tacrolimus therapy. Pt. was discharged on PO Lasix and Metolazone. Scr on admission was elevated at 1.60 (7/7/23), and remained elevated/stable at 1.68 on hospital discharge (7/14/23). Scr was 1.86 on outpatient labs on 8/24/23, increased to 2.80 (9/29/23). Scr elevated/stable at 2.06 (baseline ~1.7). Pt. received first dose of Rituxan on 9/26/23. UPCR elevated at 2.8 on 9/29/23. Of note, UPCR was elevated at 6.5 on 8/24/23. Serum albumin low at 3.2 today. On Bumex 2mg IV BID as per cardiology. Continue with Tacrolimus at current dose (last tacrolimus trough 3.6 on 9/30/23). Pt. to receive Rituxan in ~2 weeks as outpatient. Obtain renal US with doppler to evaluate for renal vein thrombosis. Monitor labs and urine output. Avoid nephrotoxins. Dose medications as per eGFR.
- presents with bilateral LE swelling to mid thigh, BNP elevated >36K, ddx flare of nephrotic syndrome vs decompensate HF   - TTE with EF 29% with severe LV systolic dysfunction  - < from: US Duplex Venous Lower Ext Complete, Bilateral (09.29.23 @ 17:05) >    No evidence of deep venous thrombosis in either lower extremity.    Increased lower extremity venous pulsatility, which may be seen with   elevated right heart pressure.
- presents with bilateral LE swelling to mid thigh, BNP elevated >36K, ddx flare of nephrotic syndrome vs decompensate HF   - TTE with EF 29% with severe LV systolic dysfunction  - < from: US Duplex Venous Lower Ext Complete, Bilateral (09.29.23 @ 17:05) >    No evidence of deep venous thrombosis in either lower extremity.    Increased lower extremity venous pulsatility, which may be seen with   elevated right heart pressure.

## 2023-10-02 NOTE — PROGRESS NOTE ADULT - PROBLEM SELECTOR PLAN 7
- was previously on atorvastatin 20 mg, patient states no longer on this medications   -

## 2023-10-02 NOTE — PROGRESS NOTE ADULT - PROBLEM SELECTOR PROBLEM 1
Acute kidney injury superimposed on CKD
Swelling of lower extremity

## 2023-10-02 NOTE — PROGRESS NOTE ADULT - PROBLEM SELECTOR PLAN 6
- hx of DVT March 2023 after knee replacement in [place]   - on apixaban 5mg BID, will continue

## 2023-10-02 NOTE — PROGRESS NOTE ADULT - PROBLEM SELECTOR PROBLEM 6
History of DVT of lower extremity

## 2023-10-03 ENCOUNTER — TRANSCRIPTION ENCOUNTER (OUTPATIENT)
Age: 68
End: 2023-10-03

## 2023-10-03 VITALS
TEMPERATURE: 98 F | SYSTOLIC BLOOD PRESSURE: 117 MMHG | RESPIRATION RATE: 18 BRPM | HEART RATE: 74 BPM | OXYGEN SATURATION: 100 % | DIASTOLIC BLOOD PRESSURE: 72 MMHG

## 2023-10-03 LAB
ANION GAP SERPL CALC-SCNC: 12 MMOL/L — SIGNIFICANT CHANGE UP (ref 7–14)
BUN SERPL-MCNC: 68 MG/DL — HIGH (ref 7–23)
CALCIUM SERPL-MCNC: 9.4 MG/DL — SIGNIFICANT CHANGE UP (ref 8.4–10.5)
CHLORIDE SERPL-SCNC: 101 MMOL/L — SIGNIFICANT CHANGE UP (ref 98–107)
CO2 SERPL-SCNC: 29 MMOL/L — SIGNIFICANT CHANGE UP (ref 22–31)
CREAT SERPL-MCNC: 1.92 MG/DL — HIGH (ref 0.5–1.3)
EGFR: 38 ML/MIN/1.73M2 — LOW
GLUCOSE SERPL-MCNC: 99 MG/DL — SIGNIFICANT CHANGE UP (ref 70–99)
HCT VFR BLD CALC: 29.9 % — LOW (ref 39–50)
HGB BLD-MCNC: 9.4 G/DL — LOW (ref 13–17)
MAGNESIUM SERPL-MCNC: 2.5 MG/DL — SIGNIFICANT CHANGE UP (ref 1.6–2.6)
MCHC RBC-ENTMCNC: 29 PG — SIGNIFICANT CHANGE UP (ref 27–34)
MCHC RBC-ENTMCNC: 31.4 GM/DL — LOW (ref 32–36)
MCV RBC AUTO: 92.3 FL — SIGNIFICANT CHANGE UP (ref 80–100)
NRBC # BLD: 0 /100 WBCS — SIGNIFICANT CHANGE UP (ref 0–0)
NRBC # FLD: 0 K/UL — SIGNIFICANT CHANGE UP (ref 0–0)
PHOSPHATE SERPL-MCNC: 3.8 MG/DL — SIGNIFICANT CHANGE UP (ref 2.5–4.5)
PLATELET # BLD AUTO: 488 K/UL — HIGH (ref 150–400)
POTASSIUM SERPL-MCNC: 4 MMOL/L — SIGNIFICANT CHANGE UP (ref 3.5–5.3)
POTASSIUM SERPL-SCNC: 4 MMOL/L — SIGNIFICANT CHANGE UP (ref 3.5–5.3)
RBC # BLD: 3.24 M/UL — LOW (ref 4.2–5.8)
RBC # FLD: 15.6 % — HIGH (ref 10.3–14.5)
SODIUM SERPL-SCNC: 142 MMOL/L — SIGNIFICANT CHANGE UP (ref 135–145)
TACROLIMUS SERPL-MCNC: 2.2 NG/ML — SIGNIFICANT CHANGE UP
WBC # BLD: 6.94 K/UL — SIGNIFICANT CHANGE UP (ref 3.8–10.5)
WBC # FLD AUTO: 6.94 K/UL — SIGNIFICANT CHANGE UP (ref 3.8–10.5)

## 2023-10-03 PROCEDURE — 93306 TTE W/DOPPLER COMPLETE: CPT | Mod: 26

## 2023-10-03 RX ORDER — ATORVASTATIN CALCIUM 80 MG/1
20 TABLET, FILM COATED ORAL AT BEDTIME
Refills: 0 | Status: DISCONTINUED | OUTPATIENT
Start: 2023-10-03 | End: 2023-10-03

## 2023-10-03 RX ORDER — METOLAZONE 5 MG/1
1 TABLET ORAL
Refills: 0 | DISCHARGE

## 2023-10-03 RX ORDER — BUMETANIDE 0.25 MG/ML
1 INJECTION INTRAMUSCULAR; INTRAVENOUS
Qty: 60 | Refills: 0
Start: 2023-10-03 | End: 2023-11-01

## 2023-10-03 RX ORDER — ATORVASTATIN CALCIUM 80 MG/1
1 TABLET, FILM COATED ORAL
Qty: 30 | Refills: 0
Start: 2023-10-03 | End: 2023-11-01

## 2023-10-03 RX ORDER — METOPROLOL TARTRATE 50 MG
1 TABLET ORAL
Qty: 30 | Refills: 0
Start: 2023-10-03 | End: 2023-11-01

## 2023-10-03 RX ORDER — HYDRALAZINE HCL 50 MG
25 TABLET ORAL EVERY 8 HOURS
Refills: 0 | Status: DISCONTINUED | OUTPATIENT
Start: 2023-10-03 | End: 2023-10-03

## 2023-10-03 RX ORDER — MUPIROCIN 20 MG/G
1 OINTMENT TOPICAL
Qty: 1 | Refills: 0
Start: 2023-10-03 | End: 2023-10-04

## 2023-10-03 RX ORDER — FUROSEMIDE 40 MG
1 TABLET ORAL
Refills: 0 | DISCHARGE

## 2023-10-03 RX ADMIN — CHLORHEXIDINE GLUCONATE 1 APPLICATION(S): 213 SOLUTION TOPICAL at 12:19

## 2023-10-03 RX ADMIN — TACROLIMUS 1 MILLIGRAM(S): 5 CAPSULE ORAL at 05:21

## 2023-10-03 RX ADMIN — PANTOPRAZOLE SODIUM 40 MILLIGRAM(S): 20 TABLET, DELAYED RELEASE ORAL at 06:08

## 2023-10-03 RX ADMIN — BUMETANIDE 1 MILLIGRAM(S): 0.25 INJECTION INTRAMUSCULAR; INTRAVENOUS at 13:37

## 2023-10-03 RX ADMIN — Medication 50 MILLIGRAM(S): at 05:21

## 2023-10-03 RX ADMIN — BUMETANIDE 1 MILLIGRAM(S): 0.25 INJECTION INTRAMUSCULAR; INTRAVENOUS at 05:21

## 2023-10-03 RX ADMIN — APIXABAN 5 MILLIGRAM(S): 2.5 TABLET, FILM COATED ORAL at 05:21

## 2023-10-03 NOTE — DISCHARGE NOTE PROVIDER - NSFOLLOWUPCLINICS_GEN_ALL_ED_FT
[facility]  Internal Medicine  [address] [place] [address]  Phone: [phone]  Fax: [phone]

## 2023-10-03 NOTE — PROGRESS NOTE ADULT - SUBJECTIVE AND OBJECTIVE BOX
Andrea Otero MD  Interventional Cardiology / Endovascular Specialist  [place] Office : [address] [place] [address] [place] [address]  Tel:   [place] Office : [address] [place] [facility] [place] [address]  Tel: [phone]    Pt lying in bed in [facility]   	  MEDICATIONS:  apixaban 5 milliGRAM(s) Oral every 12 hours  buMETAnide Injectable 2 milliGRAM(s) IV Push two times a day  metoprolol succinate ER 25 milliGRAM(s) Oral daily        acetaminophen     Tablet .. 650 milliGRAM(s) Oral every 6 hours PRN    pantoprazole    Tablet 40 milliGRAM(s) Oral before breakfast    dextrose 50% Injectable 12.5 Gram(s) IV Push once  dextrose 50% Injectable 25 Gram(s) IV Push once  dextrose 50% Injectable 25 Gram(s) IV Push once  dextrose Oral Gel 15 Gram(s) Oral once PRN  glucagon  Injectable 1 milliGRAM(s) IntraMuscular once    chlorhexidine 2% Cloths 1 Application(s) Topical daily  dextrose 5%. 1000 milliLiter(s) IV Continuous <Continuous>  dextrose 5%. 1000 milliLiter(s) IV Continuous <Continuous>  mupirocin 2% Ointment 1 Application(s) Topical two times a day  tacrolimus 1 milliGRAM(s) Oral two times a day      PAST MEDICAL/SURGICAL HISTORY  PAST MEDICAL & SURGICAL HISTORY:  Hypertension      Hyperlipidemia      Chronic GERD      Type 2 diabetes mellitus      Status post right knee replacement          SOCIAL HISTORY: Substance Use (street drugs): ( x ) never used  (  ) other:    FAMILY HISTORY:  FH: hyperlipidemia (Father)        REVIEW OF SYSTEMS:  CONSTITUTIONAL: No fever, weight loss, or fatigue  EYES: No eye pain, visual disturbances, or discharge  ENMT:  No difficulty hearing, tinnitus, vertigo; No sinus or throat pain  BREASTS: No pain, masses, or nipple discharge  GASTROINTESTINAL: No abdominal or epigastric pain. No nausea, vomiting, or hematemesis; No diarrhea or constipation. No melena or hematochezia.  GENITOURINARY: No dysuria, frequency, hematuria, or incontinence  NEUROLOGICAL: No headaches, memory loss, loss of strength, numbness, or tremors  ENDOCRINE: No heat or cold intolerance; No hair loss  MUSCULOSKELETAL: No joint pain or swelling; No muscle, back, or extremity pain  PSYCHIATRIC: No depression, anxiety, mood swings, or difficulty sleeping  HEME/LYMPH: No easy bruising, or bleeding gums  All others negative    PHYSICAL EXAM:  T(C): 36.9 (10-01-23 @ 21:30), Max: 36.9 (10-01-23 @ 05:50)  HR: 73 (10-01-23 @ 21:30) (69 - 73)  BP: 131/84 (10-01-23 @ 21:30) (130/95 - 131/86)  RR: 17 (10-01-23 @ 21:30) (17 - 17)  SpO2: 100% (10-01-23 @ 21:30) (100% - 100%)  Wt(kg): --  I&O's Summary    30 Sep 2023 07:01  -  01 Oct 2023 07:00  --------------------------------------------------------  IN: 200 mL / OUT: 2050 mL / NET: -1850 mL      GENERAL: NAD  EYES: conjunctiva and sclera clear  ENMT: No tonsillar erythema, exudates, or enlargement  Cardiovascular: Normal S1 S2, No JVD, No murmurs, No edema  Respiratory: Lungs clear to auscultation	  Gastrointestinal:  Soft, Non-tender, + BS	  Extremities: 1+ edema b/l                                 9.0    7.05  )-----------( 493      ( 01 Oct 2023 04:40 )             29.0     10-01    142  |  102  |  78<H>  ----------------------------<  104<H>  5.1   |  32<H>  |  2.04<H>    Ca    9.3      01 Oct 2023 17:13  Phos  4.4     10-01  Mg     2.70     10-01    TPro  6.2  /  Alb  2.8<L>  /  TBili  <0.2  /  DBili  x   /  AST  13  /  ALT  9   /  AlkPhos  93  10-01    proBNP:   Lipid Profile:   HgA1c:   TSH:     Consultant(s) Notes Reviewed:  [x ] YES  [ ] NO    Care Discussed with Consultants/Other Providers [ x] YES  [ ] NO    Imaging Personally Reviewed independently:  [x] YES  [ ] NO    All labs, radiologic studies, vitals, orders and medications list reviewed. Patient is seen and examined at bedside. Case discussed with medical team.              
Andrea Otero MD  Interventional Cardiology / Advance Heart Failure and Cardiac Transplant Specialist  [place] Office : [address] [place] [address] [facility] [address]  Tel:   [place] Office : [address] [facility] [place] [address]  Tel: [phone]       Pt is lying in bed comfortable not in distress, no chest pains no SOB no palpitations  	  MEDICATIONS:  apixaban 5 milliGRAM(s) Oral every 12 hours  buMETAnide 1 milliGRAM(s) Oral two times a day        acetaminophen     Tablet .. 650 milliGRAM(s) Oral every 6 hours PRN    pantoprazole    Tablet 40 milliGRAM(s) Oral before breakfast    dextrose 50% Injectable 25 Gram(s) IV Push once  dextrose 50% Injectable 12.5 Gram(s) IV Push once  dextrose 50% Injectable 25 Gram(s) IV Push once  dextrose Oral Gel 15 Gram(s) Oral once PRN  glucagon  Injectable 1 milliGRAM(s) IntraMuscular once    chlorhexidine 2% Cloths 1 Application(s) Topical daily  dextrose 5%. 1000 milliLiter(s) IV Continuous <Continuous>  dextrose 5%. 1000 milliLiter(s) IV Continuous <Continuous>  mupirocin 2% Ointment 1 Application(s) Topical two times a day  tacrolimus 1 milliGRAM(s) Oral two times a day      PAST MEDICAL/SURGICAL HISTORY  PAST MEDICAL & SURGICAL HISTORY:  Hypertension      Hyperlipidemia      Chronic GERD      Type 2 diabetes mellitus      Status post right knee replacement          SOCIAL HISTORY: Substance Use (street drugs): ( x ) never used  (  ) other:    FAMILY HISTORY:  FH: hyperlipidemia (Father)       g gums       PHYSICAL EXAM:  T(C): 36.8 (10-02-23 @ 17:20), Max: 36.9 (10-01-23 @ 21:30)  HR: 71 (10-02-23 @ 17:20) (71 - 80)  BP: 125/70 (10-02-23 @ 17:20) (119/86 - 135/91)  RR: 18 (10-02-23 @ 17:20) (17 - 18)  SpO2: 100% (10-02-23 @ 17:20) (100% - 100%)  Wt(kg): --  I&O's Summary    02 Oct 2023 07:01  -  02 Oct 2023 21:04  --------------------------------------------------------  IN: 0 mL / OUT: 4100 mL / NET: -4100 mL          GENERAL: NAD  EYES:   PERRLA   ENMT:   Moist mucous membranes, Good dentition, No lesions  Cardiovascular: Normal S1 S2, No JVD, No murmurs, No edema  Respiratory: Lungs clear to auscultation	  Gastrointestinal:  Soft, Non-tender, + BS	  Extremities: no edema                                    9.7    8.16  )-----------( 513      ( 02 Oct 2023 05:12 )             31.1     10-02    140  |  99  |  74<H>  ----------------------------<  98  3.8   |  31  |  2.06<H>    Ca    9.4      02 Oct 2023 05:12  Phos  4.4     10-01  Mg     2.70     10-02    TPro  6.4  /  Alb  3.2<L>  /  TBili  0.2  /  DBili  x   /  AST  13  /  ALT  9   /  AlkPhos  92  10-02    proBNP:   Lipid Profile:   HgA1c:   TSH:     Consultant(s) Notes Reviewed:  [x ] YES  [ ] NO    Care Discussed with Consultants/Other Providers [ x] YES  [ ] NO    Imaging Personally Reviewed independently:  [x] YES  [ ] NO    All labs, radiologic studies, vitals, orders and medications list reviewed. Patient is seen and examined at bedside. Case discussed with medical team.        
Andrea Otero MD  Interventional Cardiology / Advance Heart Failure and Cardiac Transplant Specialist  [place] Office : [address] [place] [address] [place] [address]  Tel:   [place] Office : [address] [facility] [place] [address]  Tel: [phone]       pt is lying in bed comfortable not in distress  	  MEDICATIONS:  apixaban 5 milliGRAM(s) Oral every 12 hours  buMETAnide 1 milliGRAM(s) Oral two times a day  metoprolol succinate ER 50 milliGRAM(s) Oral daily        acetaminophen     Tablet .. 650 milliGRAM(s) Oral every 6 hours PRN    pantoprazole    Tablet 40 milliGRAM(s) Oral before breakfast    atorvastatin 20 milliGRAM(s) Oral at bedtime  dextrose 50% Injectable 25 Gram(s) IV Push once  dextrose 50% Injectable 12.5 Gram(s) IV Push once  dextrose 50% Injectable 25 Gram(s) IV Push once  dextrose Oral Gel 15 Gram(s) Oral once PRN  glucagon  Injectable 1 milliGRAM(s) IntraMuscular once    chlorhexidine 2% Cloths 1 Application(s) Topical daily  dextrose 5%. 1000 milliLiter(s) IV Continuous <Continuous>  dextrose 5%. 1000 milliLiter(s) IV Continuous <Continuous>  mupirocin 2% Ointment 1 Application(s) Topical two times a day  tacrolimus 1 milliGRAM(s) Oral two times a day      PAST MEDICAL/SURGICAL HISTORY  PAST MEDICAL & SURGICAL HISTORY:  Hypertension      Hyperlipidemia      Chronic GERD      Type 2 diabetes mellitus      Status post right knee replacement          SOCIAL HISTORY: Substance Use (street drugs): ( x ) never used  (  ) other:    FAMILY HISTORY:  FH: hyperlipidemia (Father)           PHYSICAL EXAM:  T(C): 36.8 (10-03-23 @ 17:21), Max: 36.8 (10-03-23 @ 05:10)  HR: 74 (10-03-23 @ 17:21) (70 - 74)  BP: 117/72 (10-03-23 @ 17:21) (117/72 - 135/85)  RR: 18 (10-03-23 @ 17:21) (18 - 18)  SpO2: 100% (10-03-23 @ 17:21) (100% - 100%)  Wt(kg): --  I&O's Summary    02 Oct 2023 07:01  -  03 Oct 2023 07:00  --------------------------------------------------------  IN: 0 mL / OUT: 4900 mL / NET: -4900 mL    03 Oct 2023 07:01  -  03 Oct 2023 22:23  --------------------------------------------------------  IN: 0 mL / OUT: 500 mL / NET: -500 mL          GENERAL: NAD   EYES: EOMI, PERRLA, conjunctiva and sclera clear  ENMT: No tonsillar erythema, exudates, or enlargement; Moist mucous membranes, Good dentition, No lesions  Cardiovascular: Normal S1 S2, No JVD, No murmurs, No edema  Respiratory: Lungs clear to auscultation	  Gastrointestinal:  Soft, Non-tender, + BS	  Extremities: no edema                                    9.4    6.94  )-----------( 488      ( 03 Oct 2023 06:00 )             29.9     10-03    142  |  101  |  68<H>  ----------------------------<  99  4.0   |  29  |  1.92<H>    Ca    9.4      03 Oct 2023 06:00  Phos  3.8     10-03  Mg     2.50     10-03    TPro  6.4  /  Alb  3.2<L>  /  TBili  0.2  /  DBili  x   /  AST  13  /  ALT  9   /  AlkPhos  92  10-02    proBNP:   Lipid Profile:   HgA1c:   TSH:     Consultant(s) Notes Reviewed:  [x ] YES  [ ] NO    Care Discussed with Consultants/Other Providers [ x] YES  [ ] NO    Imaging Personally Reviewed independently:  [x] YES  [ ] NO    All labs, radiologic studies, vitals, orders and medications list reviewed. Patient is seen and examined at bedside. Case discussed with medical team.        
pt was evaluated by hospitalist earlier during the day 
 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 Reports doing well and improving LE edema with good urine output. Otherwise no acute complaints.    PAST HISTORY  --------------------------------------------------------------------------------  No significant changes to PMH, PSH, FHx, SHx, unless otherwise noted    ALLERGIES & MEDICATIONS  --------------------------------------------------------------------------------  Allergies  No Known Allergies    Intolerances    Standing Inpatient Medications  apixaban 5 milliGRAM(s) Oral every 12 hours  buMETAnide Injectable 2 milliGRAM(s) IV Push two times a day  chlorhexidine 2% Cloths 1 Application(s) Topical daily  dextrose 5%. 1000 milliLiter(s) IV Continuous <Continuous>  dextrose 5%. 1000 milliLiter(s) IV Continuous <Continuous>  dextrose 50% Injectable 12.5 Gram(s) IV Push once  dextrose 50% Injectable 25 Gram(s) IV Push once  dextrose 50% Injectable 25 Gram(s) IV Push once  glucagon  Injectable 1 milliGRAM(s) IntraMuscular once  metoprolol succinate ER 25 milliGRAM(s) Oral daily  mupirocin 2% Ointment 1 Application(s) Topical two times a day  pantoprazole    Tablet 40 milliGRAM(s) Oral before breakfast  tacrolimus 1 milliGRAM(s) Oral two times a day    PRN Inpatient Medications  acetaminophen     Tablet .. 650 milliGRAM(s) Oral every 6 hours PRN  dextrose Oral Gel 15 Gram(s) Oral once PRN    REVIEW OF SYSTEMS  --------------------------------------------------------------------------------  As per HPI    VITALS/PHYSICAL EXAM  --------------------------------------------------------------------------------  T(C): 36.7 (10-02-23 @ 09:45), Max: 36.9 (10-01-23 @ 21:30)  HR: 71 (10-02-23 @ 09:45) (71 - 80)  BP: 119/86 (10-02-23 @ 09:45) (119/86 - 135/91)  RR: 17 (10-02-23 @ 09:45) (17 - 17)  SpO2: 100% (10-02-23 @ 09:45) (100% - 100%)  Wt(kg): --    10-02-23 @ 07:01  -  10-02-23 @ 13:51  --------------------------------------------------------  IN: 0 mL / OUT: 2000 mL / NET: -2000 mL    Physical Exam:  Gen: resting, NAD  HEENT: MMM  Pulm: CTA B/L  CV: S1S2+  Abd: Soft, +BS   Ext: trace RLE edema  Neuro: Awake and alert  Skin: Warm and dry    LABS/STUDIES  --------------------------------------------------------------------------------              9.7    8.16  >-----------<  513      [10-02-23 @ 05:12]              31.1     140  |  99  |  74  ----------------------------<  98      [10-02-23 @ 05:12]  3.8   |  31  |  2.06        Ca     9.4     [10-02-23 @ 05:12]      Mg     2.70     [10-02-23 @ 05:12]      Phos  4.4     [10-01-23 @ 17:13]    TPro  6.4  /  Alb  3.2  /  TBili  0.2  /  DBili  x   /  AST  13  /  ALT  9   /  AlkPhos  92  [10-02-23 @ 05:12]    Creatinine Trend:  SCr 2.06 [10-02 @ 05:12]  SCr 2.04 [10-01 @ 17:13]  SCr 2.03 [10-01 @ 04:40]  SCr 2.47 [09-30 @ 05:50]  SCr 2.80 [09-29 @ 10:45]    Urinalysis - [10-02-23 @ 05:12]      Color  / Appearance  / SG  / pH       Gluc 98 / Ketone   / Bili  / Urobili        Blood  / Protein  / Leuk Est  / Nitrite       RBC  / WBC  / Hyaline  / Gran  / Sq Epi  / Non Sq Epi  / Bacteria     Urine Creatinine 89      [09-29-23 @ 10:45]  Urine Protein 244      [09-29-23 @ 10:45]    Lipid: chol 231, , HDL 57, LDL --      [09-30-23 @ 05:50]
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 Reports doing well. States that he has been urinating a lot and notes decreasing LE edema. Otherwise no acute complaints. s/p Bumex infusion 10hrs with UOP 2L in 24hrs.     PAST HISTORY  --------------------------------------------------------------------------------  No significant changes to PMH, PSH, FHx, SHx, unless otherwise noted    ALLERGIES & MEDICATIONS  --------------------------------------------------------------------------------  Allergies  No Known Allergies  Intolerances    Standing Inpatient Medications  apixaban 5 milliGRAM(s) Oral every 12 hours  buMETAnide Infusion 1 mG/Hr IV Continuous <Continuous>  chlorhexidine 2% Cloths 1 Application(s) Topical daily  metoprolol succinate ER 25 milliGRAM(s) Oral daily  pantoprazole    Tablet 40 milliGRAM(s) Oral before breakfast  potassium chloride    Tablet ER 40 milliEquivalent(s) Oral once  tacrolimus 1 milliGRAM(s) Oral two times a day    PRN Inpatient Medications  acetaminophen     Tablet .. 650 milliGRAM(s) Oral every 6 hours PRN      REVIEW OF SYSTEMS  --------------------------------------------------------------------------------  Gen: +Generalized weakness  Skin: No rash  Head/Eyes/Ears: No headache   Respiratory: No dyspnea, cough  CV: No chest pain  GI: no n/v  : increased urine output  MSK: improving B/L UE and LE edema, +R knee pain, +R hip pain  Heme: No easy bruising or bleeding    All other systems were reviewed and are negative, except as noted.    VITALS/PHYSICAL EXAM  --------------------------------------------------------------------------------  T(C): 36.3 (09-30-23 @ 06:43), Max: 36.8 (09-29-23 @ 21:04)  HR: 88 (09-30-23 @ 06:43) (72 - 88)  BP: 132/79 (09-30-23 @ 06:43) (132/79 - 137/89)  RR: 18 (09-30-23 @ 06:43) (16 - 18)  SpO2: 100% (09-30-23 @ 06:43) (98% - 100%)  Wt(kg): --  Height (cm): 177.5 (09-29-23 @ 20:57)  Weight (kg): 83.4 (09-29-23 @ 20:57)  BMI (kg/m2): 26.5 (09-29-23 @ 20:57)  BSA (m2): 2.01 (09-29-23 @ 20:57)    09-29-23 @ 07:01  -  09-30-23 @ 07:00  --------------------------------------------------------  IN: 0 mL / OUT: 2000 mL / NET: -2000 mL      Physical Exam:  Gen: resting, NAD  HEENT: MMM  Pulm: CTA B/L  CV: S1S2+  Abd: Soft, +BS   Ext: +B/L UE and LE edema  Neuro: Awake and alert  Skin: Warm and dry  Vascular access: Peripheral IV line    LABS/STUDIES  --------------------------------------------------------------------------------              8.9    7.84  >-----------<  480      [09-30-23 @ 05:50]              27.4     140  |  98  |  98  ----------------------------<  104      [09-30-23 @ 05:50]  3.4   |  28  |  2.47        Ca     9.3     [09-30-23 @ 05:50]    TPro  6.4  /  Alb  3.0  /  TBili  0.2  /  DBili  x   /  AST  16  /  ALT  11  /  AlkPhos  96  [09-30-23 @ 05:50]    Creatinine Trend:  SCr 2.47 [09-30 @ 05:50]  SCr 2.80 [09-29 @ 10:45]    Urine Creatinine 89      [09-29-23 @ 10:45]  Urine Protein 244      [09-29-23 @ 10:45]    Lipid: chol 231, , HDL 57, LDL --      [09-30-23 @ 05:50]      
    SUBJECTIVE / OVERNIGHT EVENTS:pt seen and examined  10-1-23    MEDICATIONS  (STANDING):  apixaban 5 milliGRAM(s) Oral every 12 hours  buMETAnide Injectable 2 milliGRAM(s) IV Push two times a day  chlorhexidine 2% Cloths 1 Application(s) Topical daily  dextrose 5%. 1000 milliLiter(s) (100 mL/Hr) IV Continuous <Continuous>  dextrose 5%. 1000 milliLiter(s) (50 mL/Hr) IV Continuous <Continuous>  dextrose 50% Injectable 12.5 Gram(s) IV Push once  dextrose 50% Injectable 25 Gram(s) IV Push once  dextrose 50% Injectable 25 Gram(s) IV Push once  glucagon  Injectable 1 milliGRAM(s) IntraMuscular once  metoprolol succinate ER 25 milliGRAM(s) Oral daily  mupirocin 2% Ointment 1 Application(s) Topical two times a day  pantoprazole    Tablet 40 milliGRAM(s) Oral before breakfast  tacrolimus 1 milliGRAM(s) Oral two times a day    MEDICATIONS  (PRN):  acetaminophen     Tablet .. 650 milliGRAM(s) Oral every 6 hours PRN Temp greater or equal to 38C (100.4F), Mild Pain (1 - 3)  dextrose Oral Gel 15 Gram(s) Oral once PRN Blood Glucose LESS THAN 70 milliGRAM(s)/deciliter    Vital Signs Last 24 Hrs  T(C): 36.9 (10-01-23 @ 21:30), Max: 36.9 (10-01-23 @ 05:50)  T(F): 98.4 (10-01-23 @ 21:30), Max: 98.4 (10-01-23 @ 05:50)  HR: 73 (10-01-23 @ 21:30) (69 - 73)  BP: 131/84 (10-01-23 @ 21:30) (130/95 - 131/86)  BP(mean): --  RR: 17 (10-01-23 @ 21:30) (17 - 17)  SpO2: 100% (10-01-23 @ 21:30) (100% - 100%)          Constitutional: No fever, fatigue  Skin: No rash.  Eyes: No recent vision problems or eye pain.  ENT: No congestion, ear pain, or sore throat.  Cardiovascular: No chest pain or palpation.  Respiratory: No cough, shortness of breath, congestion, or wheezing.  Gastrointestinal: No abdominal pain, nausea, vomiting, or diarrhea.  Genitourinary: No dysuria.  Musculoskeletal: No joint swelling.  Neurologic: No headache.    PHYSICAL EXAM:  GENERAL: NAD  EYES: EOMI, PERRLA  NECK: Supple, No JVD  CHEST/LUNG: dec breath sounds at bases  HEART:  S1 , S2 +  ABDOMEN: soft , bs+  EXTREMITIES:  edema+  NEUROLOGY:alert awake    LABS:  10-01    142  |  102  |  78<H>  ----------------------------<  104<H>  5.1   |  32<H>  |  2.04<H>    Ca    9.3      01 Oct 2023 17:13  Phos  4.4     10-01  Mg     2.70     10-01    TPro  6.2  /  Alb  2.8<L>  /  TBili  <0.2  /  DBili      /  AST  13  /  ALT  9   /  AlkPhos  93  10-01    Creatinine Trend: 2.04 <--, 2.03 <--, 2.47 <--, 2.80 <--                        9.0    7.05  )-----------( 493      ( 01 Oct 2023 04:40 )             29.0     Urine Studies:  Urinalysis Basic - ( 01 Oct 2023 17:13 )    Color:  / Appearance:  / SG:  / pH:   Gluc: 104 mg/dL / Ketone:   / Bili:  / Urobili:    Blood:  / Protein:  / Nitrite:    Leuk Esterase:  / RBC:  / WBC    Sq Epi:  / Non Sq Epi:  / Bacteria:       Creatinine, Random Urine: 89 mg/dL (09-29 @ 10:45)  Protein/Creatinine Ratio Calculation: 2.8 Ratio (09-29 @ 10:45)          LIVER FUNCTIONS - ( 01 Oct 2023 04:40 )  Alb: 2.8 g/dL / Pro: 6.2 g/dL / ALK PHOS: 93 U/L / ALT: 9 U/L / AST: 13 U/L / GGT: x                    RADIOLOGY & ADDITIONAL TESTS:    Imaging Personally Reviewed:    Consultant(s) Notes Reviewed:      Care Discussed with Consultants/Other Providers:  
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 Reports doing well and improving LE edema, Continues to urinate a lot. Otherwise no acute complaints. s/p Bumex infusion 9/29 and 9/20. UOP 2L in 24hrs.       PAST HISTORY  --------------------------------------------------------------------------------  No significant changes to PMH, PSH, FHx, SHx, unless otherwise noted    ALLERGIES & MEDICATIONS  --------------------------------------------------------------------------------  Allergies  No Known Allergies  Intolerances    Standing Inpatient Medications  apixaban 5 milliGRAM(s) Oral every 12 hours  buMETAnide Infusion 1 mG/Hr IV Continuous <Continuous>  chlorhexidine 2% Cloths 1 Application(s) Topical daily  dextrose 5%. 1000 milliLiter(s) IV Continuous <Continuous>  dextrose 5%. 1000 milliLiter(s) IV Continuous <Continuous>  dextrose 50% Injectable 12.5 Gram(s) IV Push once  dextrose 50% Injectable 25 Gram(s) IV Push once  dextrose 50% Injectable 25 Gram(s) IV Push once  glucagon  Injectable 1 milliGRAM(s) IntraMuscular once  metoprolol succinate ER 25 milliGRAM(s) Oral daily  mupirocin 2% Ointment 1 Application(s) Topical two times a day  pantoprazole    Tablet 40 milliGRAM(s) Oral before breakfast  tacrolimus 1 milliGRAM(s) Oral two times a day    PRN Inpatient Medications  acetaminophen     Tablet .. 650 milliGRAM(s) Oral every 6 hours PRN  dextrose Oral Gel 15 Gram(s) Oral once PRN    REVIEW OF SYSTEMS  --------------------------------------------------------------------------------  as above    VITALS/PHYSICAL EXAM  --------------------------------------------------------------------------------  T(C): 36.9 (10-01-23 @ 09:34), Max: 37 (09-30-23 @ 19:45)  HR: 69 (10-01-23 @ 09:34) (69 - 73)  BP: 131/86 (10-01-23 @ 09:34) (129/85 - 131/86)  RR: 17 (10-01-23 @ 09:34) (16 - 18)  SpO2: 100% (10-01-23 @ 09:34) (100% - 100%)  Wt(kg): --  Height (cm): 177.5 (09-29-23 @ 20:57)  Weight (kg): 83.4 (09-29-23 @ 20:57)  BMI (kg/m2): 26.5 (09-29-23 @ 20:57)  BSA (m2): 2.01 (09-29-23 @ 20:57)      09-30-23 @ 07:01  -  10-01-23 @ 07:00  --------------------------------------------------------  IN: 200 mL / OUT: 2050 mL / NET: -1850 mL    Physical Exam:  Gen: resting, NAD  HEENT: MMM  Pulm: CTA B/L  CV: S1S2+  Abd: Soft, +BS   Ext: trace B/L UE and LE edema  Neuro: Awake and alert  Skin: Warm and dry  Vascular access: Peripheral IV line    LABS/STUDIES  --------------------------------------------------------------------------------              9.0    7.05  >-----------<  493      [10-01-23 @ 04:40]              29.0     143  |  99  |  82  ----------------------------<  93      [10-01-23 @ 04:40]  4.4   |  29  |  2.03        Ca     9.3     [10-01-23 @ 04:40]    TPro  6.2  /  Alb  2.8  /  TBili  <0.2  /  DBili  x   /  AST  13  /  ALT  9   /  AlkPhos  93  [10-01-23 @ 04:40]    Creatinine Trend:  SCr 2.03 [10-01 @ 04:40]  SCr 2.47 [09-30 @ 05:50]  SCr 2.80 [09-29 @ 10:45]  Urine Creatinine 89      [09-29-23 @ 10:45]  Urine Protein 244      [09-29-23 @ 10:45]    Lipid: chol 231, , HDL 57, LDL --      [09-30-23 @ 05:50]    
    SUBJECTIVE / OVERNIGHT EVENTS:pt seen and examined  09-30-23    MEDICATIONS  (STANDING):  apixaban 5 milliGRAM(s) Oral every 12 hours  buMETAnide Infusion 1 mG/Hr (5 mL/Hr) IV Continuous <Continuous>  chlorhexidine 2% Cloths 1 Application(s) Topical daily  metoprolol succinate ER 25 milliGRAM(s) Oral daily  mupirocin 2% Ointment 1 Application(s) Topical two times a day  pantoprazole    Tablet 40 milliGRAM(s) Oral before breakfast  potassium chloride    Tablet ER 40 milliEquivalent(s) Oral once  tacrolimus 1 milliGRAM(s) Oral two times a day    MEDICATIONS  (PRN):  acetaminophen     Tablet .. 650 milliGRAM(s) Oral every 6 hours PRN Temp greater or equal to 38C (100.4F), Mild Pain (1 - 3)    T(C): 36.7 (09-30-23 @ 12:00), Max: 36.8 (09-29-23 @ 21:04)  HR: 70 (09-30-23 @ 12:00) (70 - 88)  BP: 127/83 (09-30-23 @ 12:00) (127/83 - 136/86)  RR: 17 (09-30-23 @ 12:00) (17 - 18)  SpO2: 100% (09-30-23 @ 12:00) (100% - 100%)    CAPILLARY BLOOD GLUCOSE      POCT Blood Glucose.: 131 mg/dL (29 Sep 2023 17:53)    I&O's Summary    29 Sep 2023 07:01  -  30 Sep 2023 07:00  --------------------------------------------------------  IN: 0 mL / OUT: 2000 mL / NET: -2000 mL        Constitutional: No fever, fatigue  Skin: No rash.  Eyes: No recent vision problems or eye pain.  ENT: No congestion, ear pain, or sore throat.  Cardiovascular: No chest pain or palpation.  Respiratory: No cough, shortness of breath, congestion, or wheezing.  Gastrointestinal: No abdominal pain, nausea, vomiting, or diarrhea.  Genitourinary: No dysuria.  Musculoskeletal: No joint swelling.  Neurologic: No headache.    PHYSICAL EXAM:  GENERAL: NAD  EYES: EOMI, PERRLA  NECK: Supple, No JVD  CHEST/LUNG: dec breath sounds at bases  HEART:  S1 , S2 +  ABDOMEN: soft , bs+  EXTREMITIES:  edema+  NEUROLOGY:alert awake      LABS:                        8.9    7.84  )-----------( 480      ( 30 Sep 2023 05:50 )             27.4     09-30    140  |  98  |  98<H>  ----------------------------<  104<H>  3.4<L>   |  28  |  2.47<H>    Ca    9.3      30 Sep 2023 05:50    TPro  6.4  /  Alb  3.0<L>  /  TBili  0.2  /  DBili  x   /  AST  16  /  ALT  11  /  AlkPhos  96  09-30          Urinalysis Basic - ( 30 Sep 2023 05:50 )    Color: x / Appearance: x / SG: x / pH: x  Gluc: 104 mg/dL / Ketone: x  / Bili: x / Urobili: x   Blood: x / Protein: x / Nitrite: x   Leuk Esterase: x / RBC: x / WBC x   Sq Epi: x / Non Sq Epi: x / Bacteria: x        RADIOLOGY & ADDITIONAL TESTS:    Imaging Personally Reviewed:    Consultant(s) Notes Reviewed:      Care Discussed with Consultants/Other Providers:  
    SUBJECTIVE / OVERNIGHT EVENTS:pt seen and examined  10-2-23    MEDICATIONS  (STANDING):  apixaban 5 milliGRAM(s) Oral every 12 hours  buMETAnide 1 milliGRAM(s) Oral two times a day  chlorhexidine 2% Cloths 1 Application(s) Topical daily  dextrose 5%. 1000 milliLiter(s) (100 mL/Hr) IV Continuous <Continuous>  dextrose 5%. 1000 milliLiter(s) (50 mL/Hr) IV Continuous <Continuous>  dextrose 50% Injectable 12.5 Gram(s) IV Push once  dextrose 50% Injectable 25 Gram(s) IV Push once  dextrose 50% Injectable 25 Gram(s) IV Push once  glucagon  Injectable 1 milliGRAM(s) IntraMuscular once  mupirocin 2% Ointment 1 Application(s) Topical two times a day  pantoprazole    Tablet 40 milliGRAM(s) Oral before breakfast  tacrolimus 1 milliGRAM(s) Oral two times a day    MEDICATIONS  (PRN):  acetaminophen     Tablet .. 650 milliGRAM(s) Oral every 6 hours PRN Temp greater or equal to 38C (100.4F), Mild Pain (1 - 3)  dextrose Oral Gel 15 Gram(s) Oral once PRN Blood Glucose LESS THAN 70 milliGRAM(s)/deciliter    Vital Signs Last 24 Hrs  T(C): 36.8 (10-02-23 @ 17:20), Max: 36.8 (10-02-23 @ 17:20)  T(F): 98.2 (10-02-23 @ 17:20), Max: 98.2 (10-02-23 @ 17:20)  HR: 71 (10-02-23 @ 17:20) (71 - 80)  BP: 125/70 (10-02-23 @ 17:20) (119/86 - 135/91)  BP(mean): --  RR: 18 (10-02-23 @ 17:20) (17 - 18)  SpO2: 100% (10-02-23 @ 17:20) (100% - 100%)        Constitutional: No fever, fatigue  Skin: No rash.  Eyes: No recent vision problems or eye pain.  ENT: No congestion, ear pain, or sore throat.  Cardiovascular: No chest pain or palpation.  Respiratory: No cough, shortness of breath, congestion, or wheezing.  Gastrointestinal: No abdominal pain, nausea, vomiting, or diarrhea.  Genitourinary: No dysuria.  Musculoskeletal: No joint swelling.  Neurologic: No headache.    PHYSICAL EXAM:  GENERAL: NAD  EYES: EOMI, PERRLA  NECK: Supple, No JVD  CHEST/LUNG: dec breath sounds at bases  HEART:  S1 , S2 +  ABDOMEN: soft , bs+  EXTREMITIES:  dec edema+  NEUROLOGY:alert awake    LABS:  10-02    140  |  99  |  74<H>  ----------------------------<  98  3.8   |  31  |  2.06<H>    Ca    9.4      02 Oct 2023 05:12  Phos  4.4     10-01  Mg     2.70     10-02    TPro  6.4  /  Alb  3.2<L>  /  TBili  0.2  /  DBili      /  AST  13  /  ALT  9   /  AlkPhos  92  10-02    Creatinine Trend: 2.06 <--, 2.04 <--, 2.03 <--, 2.47 <--, 2.80 <--                        9.7    8.16  )-----------( 513      ( 02 Oct 2023 05:12 )             31.1     Urine Studies:  Urinalysis Basic - ( 02 Oct 2023 05:12 )    Color:  / Appearance:  / SG:  / pH:   Gluc: 98 mg/dL / Ketone:   / Bili:  / Urobili:    Blood:  / Protein:  / Nitrite:    Leuk Esterase:  / RBC:  / WBC    Sq Epi:  / Non Sq Epi:  / Bacteria:       Creatinine, Random Urine: 89 mg/dL (09-29 @ 10:45)  Protein/Creatinine Ratio Calculation: 2.8 Ratio (09-29 @ 10:45)          LIVER FUNCTIONS - ( 02 Oct 2023 05:12 )  Alb: 3.2 g/dL / Pro: 6.4 g/dL / ALK PHOS: 92 U/L / ALT: 9 U/L / AST: 13 U/L / GGT: x                 LIVER FUNCTIONS - ( 01 Oct 2023 04:40 )  Alb: 2.8 g/dL / Pro: 6.2 g/dL / ALK PHOS: 93 U/L / ALT: 9 U/L / AST: 13 U/L / GGT: x                    RADIOLOGY & ADDITIONAL TESTS:    Imaging Personally Reviewed:    Consultant(s) Notes Reviewed:      Care Discussed with Consultants/Other Providers:

## 2023-10-03 NOTE — DISCHARGE NOTE PROVIDER - NSDCFUADDINST_GEN_ALL_CORE_FT
Your hemoglobin level was mildly reduced and your platelets were mildly elevated, please have repeat CBC checked at next office visit follow up within 1-2 weeks.     Your urinalysis showed a microscopic amount of blood, please have repeat urinalysis checked at next office visit.

## 2023-10-03 NOTE — DISCHARGE NOTE PROVIDER - NSDCFUSCHEDAPPT_GEN_ALL_CORE_FT
[facility]  [address]  Scheduled Appointment: 10/13/2023    [facility]  [address]  Scheduled Appointment: 10/20/2023    Feliciano Oliva  [facility]  [address]  Scheduled Appointment: 10/30/2023    [facility]  [address]  Scheduled Appointment: 11/08/2023

## 2023-10-03 NOTE — PROGRESS NOTE ADULT - PROVIDER SPECIALTY LIST ADULT
Internal Medicine
Cardiology
Internal Medicine
Cardiology
Cardiology
Nephrology
Internal Medicine
Internal Medicine

## 2023-10-03 NOTE — DISCHARGE NOTE PROVIDER - NSDCMRMEDTOKEN_GEN_ALL_CORE_FT
apixaban 5 mg oral tablet: 1 tab(s) orally 2 times a day  furosemide 40 mg oral tablet: 1 tab(s) orally 2 times a day  metOLazone 5 mg oral tablet: 1 tab(s) orally once a day  metoprolol succinate 25 mg oral tablet, extended release: 1 tab(s) orally once a day  pantoprazole 40 mg oral delayed release tablet: 1 tab(s) orally once a day (before a meal)  tacrolimus 1 mg oral capsule: 1 cap(s) orally 2 times a day  Verquvo 2.5 mg oral tablet: 1 tab(s) orally 2 times a day   apixaban 5 mg oral tablet: 1 tab(s) orally 2 times a day  atorvastatin 20 mg oral tablet: 1 tab(s) orally once a day (at bedtime)  bumetanide 1 mg oral tablet: 1 tab(s) orally 2 times a day  metoprolol succinate 50 mg oral tablet, extended release: 1 tab(s) orally once a day  mupirocin 2% topical ointment: Apply topically to affected area 2 times a day - apply to nares for 2 more days then stop  pantoprazole 40 mg oral delayed release tablet: 1 tab(s) orally once a day (before a meal)  tacrolimus 1 mg oral capsule: 1 cap(s) orally 2 times a day  Verquvo 2.5 mg oral tablet: 1 tab(s) orally 2 times a day

## 2023-10-03 NOTE — DISCHARGE NOTE PROVIDER - CARE PROVIDERS DIRECT ADDRESSES
,[email or web address]_Unknown,[email or web address]

## 2023-10-03 NOTE — DISCHARGE NOTE PROVIDER - NSDCCPCAREPLAN_GEN_ALL_CORE_FT
PRINCIPAL DISCHARGE DIAGNOSIS  Diagnosis: Congestive heart failure  Assessment and Plan of Treatment: Your ECHO on 10/3/23 showed ejection fraction of  27%  1. Left ventricular cavity is severely dilated. Left ventricular systolic function is severely decreased with an ejection fraction of 27 % by Barnes's method of disks. 2. There is increased LV mass and eccentric hypertrophy. 3. There is mild (grade 1) left ventricular diastolic dysfunction. 4. Normal right ventricular cavity size and systolic function. 5. The left atrium is moderately dilated in size. 6. Trace mitral regurgitation. Your medicantions were adjusted. Your lasix and metolazone were discontinued and you were started on bumetanide.  continue home verquvo upon discharge. outpatient follow up with Dr. De Dios scheduled Tuesday 10/10 at 2pm; plan for Dr. De Dios to refer for you for outpatient electrophysiology evaluation as you may need an ICD device for your heart. Continue recommended medication regimen, fluid restriction (Less than 1.5 Liters per day). Monitor for signs/symptoms of fluid overload and electrolyte abnormalities, such as, shortness of breath, cough, swelling, chest discomfort, changes in heart rate, dizziness, fainting, or changes in mental status. Keep track of your weight and call your outpatient physician if there are abrupt changes in weight. Follow-up with your outpatient provider after you've been discharged from the hospital for further care/recommendations.      SECONDARY DISCHARGE DIAGNOSES  Diagnosis: Swelling of lower extremity  Assessment and Plan of Treatment: Your ultrasound showed no signs of blood clots. Your swelling improved with diuresis. Continue your oral diuretic bumex upon discharge.  You have a  cardiology appointment arranged with Dr. Tejas De Dios for Tuesday 10/10 at 2pm, please follow up as scheduled.    Diagnosis: Nephrotic syndrome  Assessment and Plan of Treatment: Your renal function was abnormal on admission but improving gradually. On day of discharge your creatinine trended down to 1.92. Continue your tacrolimus as prescribed. You will need outpatient follow up with Dr. Oliva nephrology within 2 weeks for further management. Please have repeat basic metabolic panel checked within 1 week to ensure kidney function is stable.    Diagnosis: History of DVT of lower extremity  Assessment and Plan of Treatment: Continue your eliquis as prescribed and follow up with your primary care provider.    Diagnosis: Anemia  Assessment and Plan of Treatment: Follow up with your primary care provider within 1-2 weeks for repeat labs and continued monitoring as outpatient.    Diagnosis: Hyperlipidemia  Assessment and Plan of Treatment: Your total cholesterol and LDL cholesterol were elevated. You were restarted on your atorvastatin medicine. Please have repeat lipid panel checked at next office visit and maintain healthy low fat diet.

## 2023-10-03 NOTE — DISCHARGE NOTE NURSING/CASE MANAGEMENT/SOCIAL WORK - PATIENT PORTAL LINK FT
You can access the FollowMyHealth Patient Portal offered by [facility] by registering at the following website: [email or web address]. By joining [employer]’s FollowMyHealth portal, you will also be able to view your health information using other applications (apps) compatible with our system.

## 2023-10-03 NOTE — DISCHARGE NOTE PROVIDER - HOSPITAL COURSE
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 Admitted to medicine for further management.     Swelling of lower extremity:   - presents with bilateral LE swelling to mid thigh, BNP elevated >36K, ddx flare of nephrotic syndrome vs decompensate HF   - TTE from July 2023 with EF 29% with severe LV systolic dysfunction  -  LE duplex: No evidence of deep venous thrombosis in either lower extremity.  - Much improved with diuresis , will plan for discharge on PO bumex with outpatient cardiology follow up   - patient has cardiology appointment arranged with Dr. Tejas De Dios for Tuesday 10/10 at 2pm    Nephrotic syndrome:  -  Pt. with FRANDY on CKD in the setting of nephrotic syndrome/MURPHY. Pt. with known history of nephrotic syndrome from MURPHY (since 2021). Pt. was hospitalized at [facility] in July 2023 (7/7/23-7/14/23) for volume overload that improved with IV Bumex infusion. During hospital stay, pt. was initiated on Tacrolimus therapy. Pt. was discharged on PO Lasix and Metolazone. Scr on admission was elevated at 1.60 (7/7/23), and remained elevated/stable at 1.68 on hospital discharge (7/14/23)  - FRANDY is improving, on day of discharge trended down to 1.92 with improving volume status  - discussed with nephrology team on day of discharge - cleared for discharge from nephrology perspective   - Tacrolimus level 2.2 on 10/3 - discussed with nephrology team - continue current dose of tacrolimus 1mg PO BID   - outpatient follow up with Dr. Oliva nephrology within 2 weeks recommended     Congestive heart failure.   - EF 29% 7/2023 with severe LV systolic dysfunction, on Lasix 40 mg BID at home; noted to have 3+ pitting edema to mid thigh, BNP >36K (previously >14K)  - Repeat ECHO on 10/3/23 shows EF 27%  1. Left ventricular cavity is severely dilated. Left ventricular systolic function is severely decreased with an ejection fraction of 27 % by Barnes's method of disks. 2. There is increased LV mass and eccentric hypertrophy. 3. There is mild (grade 1) left ventricular diastolic dysfunction. 4. Normal right ventricular cavity size and systolic function. 5. The left atrium is moderately dilated in size. 6. Trace mitral regurgitation.  - patient much improved, continue Toprol at bumex at current doses on discharge as per cardiology   - will start hydralazine 25 PO TID upon discharge  - will continue home verquvo upon discharge  - outpatient follow up with Dr. De Dios scheduled as above ; Tuesday 10/10 at 2pm; plan for Dr. De Dios to refer for outpatient EP evaluation     Anemia:  - received Rituxan infusion on Tuesday 9/26, likely 2/2 Rituxan  - no active signs of bleeding, patient hemodynamically stable   - continue to monitor with daily CBC.  - hemoglobin remained stable, outpatient follow up recommended     Acute kidney injury superimposed on CKD.   - Cr 2.8, previous Cr 1.5-1.6, likely elevated in setting of fluid overload   - Creatinine much improved by day of discharge to 1.92  - will need repeat BMP check within 1 week of discharge   - close follow up with nephrology within 1-2 weeks     History of DVT of lower extremity.   - hx of DVT March 2023 after knee replacement in [place]   - on apixaban 5mg BID, will continue.    Hyperlipidemia.   - was previously on atorvastatin 20 mg, patient states no longer on this medications   - outpatient follow up recommended     On 10/3/23 this case was reviewed with Dr. Bonilla, the patient is medically stable and optimized for discharge. All medications were reviewed and prescriptions were sent to mutually agreed upon pharmacy.    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 Admitted to medicine for further management.     Swelling of lower extremity:   - presents with bilateral LE swelling to mid thigh, BNP elevated >36K, ddx flare of nephrotic syndrome vs decompensate HF   - TTE from July 2023 with EF 29% with severe LV systolic dysfunction  -  LE duplex: No evidence of deep venous thrombosis in either lower extremity.  - Much improved with diuresis , will plan for discharge on PO bumex with outpatient cardiology follow up   - patient has cardiology appointment arranged with Dr. Teajs De Dios for Tuesday 10/10 at 2pm    Nephrotic syndrome:  -  Pt. with FRANDY on CKD in the setting of nephrotic syndrome/MURPHY. Pt. with known history of nephrotic syndrome from MURPHY (since 2021). Pt. was hospitalized at [facility] in July 2023 (7/7/23-7/14/23) for volume overload that improved with IV Bumex infusion. During hospital stay, pt. was initiated on Tacrolimus therapy. Pt. was discharged on PO Lasix and Metolazone. Scr on admission was elevated at 1.60 (7/7/23), and remained elevated/stable at 1.68 on hospital discharge (7/14/23)  - FRANDY is improving, on day of discharge trended down to 1.92 with improving volume status  - discussed with nephrology team on day of discharge - cleared for discharge from nephrology perspective   - Tacrolimus level 2.2 on 10/3 - discussed with nephrology team - continue current dose of tacrolimus 1mg PO BID   - outpatient follow up with Dr. Oliva nephrology within 2 weeks recommended     Congestive heart failure.   - EF 29% 7/2023 with severe LV systolic dysfunction, on Lasix 40 mg BID at home; noted to have 3+ pitting edema to mid thigh, BNP >36K (previously >14K)  - Repeat ECHO on 10/3/23 shows EF 27%  1. Left ventricular cavity is severely dilated. Left ventricular systolic function is severely decreased with an ejection fraction of 27 % by Barnes's method of disks. 2. There is increased LV mass and eccentric hypertrophy. 3. There is mild (grade 1) left ventricular diastolic dysfunction. 4. Normal right ventricular cavity size and systolic function. 5. The left atrium is moderately dilated in size. 6. Trace mitral regurgitation.  - patient much improved, continue Toprol at bumex at current doses on discharge as per cardiology   - Discussed with patients outpatient cardiologist Dr. De Dios - will continue home verquvo upon discharge (vasodilator); hold off on starting hydralazine in addition to this upon discharge, he will evaluate in the office about starting hydralazine if appropriate.  - outpatient follow up with Dr. De Dios scheduled as above ; Tuesday 10/10 at 2pm; plan for Dr. De Dios to refer for outpatient EP evaluation     Anemia:  - received Rituxan infusion on Tuesday 9/26, likely 2/2 Rituxan  - no active signs of bleeding, patient hemodynamically stable   - continue to monitor with daily CBC.  - hemoglobin remained stable, outpatient follow up recommended     Acute kidney injury superimposed on CKD.   - Cr 2.8, previous Cr 1.5-1.6, likely elevated in setting of fluid overload   - Creatinine much improved by day of discharge to 1.92  - will need repeat BMP check within 1 week of discharge   - close follow up with nephrology within 1-2 weeks     History of DVT of lower extremity.   - hx of DVT March 2023 after knee replacement in [place]   - on apixaban 5mg BID, will continue.    Hyperlipidemia.   - was previously on atorvastatin 20 mg, states he ran out of this after 1 month and stopped taking it  -  - will restart atorvastatin 20mg upon discharge   - outpatient follow up recommended     On 10/3/23 this case was reviewed with Dr. Bonilla, the patient is medically stable and optimized for discharge. All medications were reviewed and prescriptions were sent to mutually agreed upon pharmacy.

## 2023-10-03 NOTE — DISCHARGE NOTE NURSING/CASE MANAGEMENT/SOCIAL WORK - NSDCPEFALRISK_GEN_ALL_CORE
For information on Fall & Injury Prevention, visit: [email or web address] OR  [email or web address] OR  https://www.cdc.gov/steadi/patient.html

## 2023-10-03 NOTE — DISCHARGE NOTE PROVIDER - CARE PROVIDER_API CALL
Tejas De Dios  Cardiology  [address] [place] [address]  Phone: [phone]  Fax: [phone]  Follow Up Time:     Feliciano Oliva  Nephrology  [address] [place] [address], [address] [place] [address]  Phone: [phone]  Fax: [phone]  Follow Up Time:

## 2023-10-03 NOTE — DISCHARGE NOTE PROVIDER - NSDCFUADDAPPT_GEN_ALL_CORE_FT
Follow up with your primary care provider Dr. Kashmir Brown within 1-2 weeks of discharge.     Follow up with Dr. De Dios cardiology on Tuesday 10/10 at 2pm as scheduled.     Follow up with your nephrologist Dr. Oliva within 2 weeks of discharge, please call for an earlier appointment then Oct 30 at 2pm.

## 2023-10-03 NOTE — PROGRESS NOTE ADULT - ASSESSMENT
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 Admitted to medicine for further management. 
Assessment and Plan     1) Acute on chronic CHF c/w Bumex  2 IV BID repeat echo , if LV <35 EP eval for aicd     2) CKD f/u renal recs     3)DVT PPX lovenox 
Assessment and Plan     1) Acute on chronic CHF c/w Bumex switch to PO 1 mg BID, LV EF still reduced , only 2 months of GMDT , pt to followup  with Dr Va Crowley. start entresto and aldactone as out pt     2) CKD f/u renal recs     3)DVT PPX lovenox 
Assessment and Plan     1) Acute on chronic CHF c/w Bumex switch to PO 1 mg BID repeat echo , if LV <35 EP eval for aicd     2) CKD f/u renal recs     3)DVT PPX lovenox 
Pt. 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 Admitted to medicine for further management. 
Pt. 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
Pt. 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 Admitted to medicine for further management.

## 2023-10-04 ENCOUNTER — TRANSCRIPTION ENCOUNTER (OUTPATIENT)
Age: 68
End: 2023-10-04

## 2023-10-04 DIAGNOSIS — I50.9 HEART FAILURE, UNSPECIFIED: ICD-10-CM

## 2023-10-04 RX ORDER — FUROSEMIDE 40 MG/1
40 TABLET ORAL
Qty: 270 | Refills: 0 | Status: DISCONTINUED | COMMUNITY
Start: 2022-09-07 | End: 2023-10-04

## 2023-10-11 ENCOUNTER — APPOINTMENT (OUTPATIENT)
Age: 68
End: 2023-10-11

## 2023-10-11 PROBLEM — E78.5 HYPERLIPIDEMIA, UNSPECIFIED: Chronic | Status: ACTIVE | Noted: 2023-09-29

## 2023-10-11 PROBLEM — E11.9 TYPE 2 DIABETES MELLITUS WITHOUT COMPLICATIONS: Chronic | Status: ACTIVE | Noted: 2023-09-29

## 2023-10-11 PROBLEM — K21.9 GASTRO-ESOPHAGEAL REFLUX DISEASE WITHOUT ESOPHAGITIS: Chronic | Status: ACTIVE | Noted: 2023-09-29

## 2023-10-11 PROBLEM — I10 ESSENTIAL (PRIMARY) HYPERTENSION: Chronic | Status: ACTIVE | Noted: 2023-09-29

## 2023-10-12 ENCOUNTER — TRANSCRIPTION ENCOUNTER (OUTPATIENT)
Age: 68
End: 2023-10-12

## 2023-10-12 ENCOUNTER — APPOINTMENT (OUTPATIENT)
Dept: NEPHROLOGY | Facility: CLINIC | Age: 68
End: 2023-10-12
Payer: MEDICARE

## 2023-10-12 VITALS
SYSTOLIC BLOOD PRESSURE: 115 MMHG | TEMPERATURE: 97.5 F | DIASTOLIC BLOOD PRESSURE: 67 MMHG | WEIGHT: 180 LBS | BODY MASS INDEX: 22.38 KG/M2 | OXYGEN SATURATION: 98 % | HEIGHT: 75 IN | HEART RATE: 73 BPM

## 2023-10-12 VITALS — WEIGHT: 176.37 LBS | BODY MASS INDEX: 22.04 KG/M2

## 2023-10-12 PROCEDURE — 99214 OFFICE O/P EST MOD 30 MIN: CPT | Mod: GC

## 2023-10-12 RX ORDER — APIXABAN 5 MG/1
5 TABLET, FILM COATED ORAL
Qty: 180 | Refills: 0 | Status: DISCONTINUED | COMMUNITY
Start: 2023-07-20 | End: 2023-10-12

## 2023-10-12 RX ORDER — PANTOPRAZOLE 40 MG/1
40 TABLET, DELAYED RELEASE ORAL
Refills: 0 | Status: DISCONTINUED | COMMUNITY
Start: 2023-10-04 | End: 2023-10-12

## 2023-10-12 RX ORDER — TACROLIMUS 5 MG/1
5 CAPSULE ORAL
Qty: 120 | Refills: 0 | Status: DISCONTINUED | COMMUNITY
Start: 2023-08-25 | End: 2023-10-12

## 2023-10-13 ENCOUNTER — APPOINTMENT (OUTPATIENT)
Dept: RHEUMATOLOGY | Facility: CLINIC | Age: 68
End: 2023-10-13
Payer: MEDICARE

## 2023-10-13 VITALS
RESPIRATION RATE: 16 BRPM | HEART RATE: 65 BPM | OXYGEN SATURATION: 96 % | DIASTOLIC BLOOD PRESSURE: 76 MMHG | TEMPERATURE: 98.4 F | SYSTOLIC BLOOD PRESSURE: 137 MMHG

## 2023-10-13 VITALS
HEART RATE: 74 BPM | RESPIRATION RATE: 16 BRPM | SYSTOLIC BLOOD PRESSURE: 156 MMHG | OXYGEN SATURATION: 95 % | TEMPERATURE: 97.7 F | DIASTOLIC BLOOD PRESSURE: 72 MMHG

## 2023-10-13 VITALS
RESPIRATION RATE: 16 BRPM | OXYGEN SATURATION: 96 % | TEMPERATURE: 98.2 F | DIASTOLIC BLOOD PRESSURE: 70 MMHG | HEART RATE: 69 BPM | SYSTOLIC BLOOD PRESSURE: 135 MMHG

## 2023-10-13 VITALS
DIASTOLIC BLOOD PRESSURE: 79 MMHG | OXYGEN SATURATION: 97 % | TEMPERATURE: 98.3 F | SYSTOLIC BLOOD PRESSURE: 142 MMHG | RESPIRATION RATE: 16 BRPM | HEART RATE: 66 BPM

## 2023-10-13 VITALS
DIASTOLIC BLOOD PRESSURE: 79 MMHG | RESPIRATION RATE: 16 BRPM | OXYGEN SATURATION: 97 % | SYSTOLIC BLOOD PRESSURE: 146 MMHG | HEART RATE: 78 BPM

## 2023-10-13 VITALS
SYSTOLIC BLOOD PRESSURE: 130 MMHG | OXYGEN SATURATION: 98 % | RESPIRATION RATE: 16 BRPM | TEMPERATURE: 98.2 F | HEART RATE: 88 BPM | DIASTOLIC BLOOD PRESSURE: 83 MMHG

## 2023-10-13 LAB
ALBUMIN SERPL ELPH-MCNC: 3.2 G/DL
ANION GAP SERPL CALC-SCNC: 16 MMOL/L
BASOPHILS # BLD AUTO: 0.07 K/UL
BASOPHILS NFR BLD AUTO: 0.9 %
BUN SERPL-MCNC: 80 MG/DL
CALCIUM SERPL-MCNC: 9 MG/DL
CALCIUM SERPL-MCNC: 9 MG/DL
CHLORIDE SERPL-SCNC: 103 MMOL/L
CO2 SERPL-SCNC: 22 MMOL/L
CREAT SERPL-MCNC: 2.1 MG/DL
EGFR: 34 ML/MIN/1.73M2
EOSINOPHIL # BLD AUTO: 0.09 K/UL
EOSINOPHIL NFR BLD AUTO: 1.2 %
GLUCOSE SERPL-MCNC: 95 MG/DL
HCT VFR BLD CALC: 24.8 %
HGB BLD-MCNC: 7.2 G/DL
IMM GRANULOCYTES NFR BLD AUTO: 0.4 %
LYMPHOCYTES # BLD AUTO: 1.02 K/UL
LYMPHOCYTES NFR BLD AUTO: 13.4 %
MAN DIFF?: NORMAL
MCHC RBC-ENTMCNC: 28.9 PG
MCHC RBC-ENTMCNC: 29 GM/DL
MCV RBC AUTO: 99.6 FL
MONOCYTES # BLD AUTO: 0.91 K/UL
MONOCYTES NFR BLD AUTO: 12 %
NEUTROPHILS # BLD AUTO: 5.49 K/UL
NEUTROPHILS NFR BLD AUTO: 72.1 %
PARATHYROID HORMONE INTACT: 17 PG/ML
PHOSPHATE SERPL-MCNC: 4.5 MG/DL
PLATELET # BLD AUTO: 413 K/UL
POTASSIUM SERPL-SCNC: 4.9 MMOL/L
RBC # BLD: 2.49 M/UL
RBC # FLD: 15.6 %
SODIUM SERPL-SCNC: 141 MMOL/L
TACROLIMUS SERPL-MCNC: <2 NG/ML
WBC # FLD AUTO: 7.61 K/UL

## 2023-10-13 PROCEDURE — 96374 THER/PROPH/DIAG INJ IV PUSH: CPT | Mod: 59

## 2023-10-13 PROCEDURE — 96415 CHEMO IV INFUSION ADDL HR: CPT

## 2023-10-13 PROCEDURE — 96413 CHEMO IV INFUSION 1 HR: CPT

## 2023-10-13 RX ORDER — DIPHENHYDRAMINE HCL 25 MG/1
25 TABLET ORAL
Qty: 0 | Refills: 0 | Status: COMPLETED
Start: 2023-09-18

## 2023-10-13 RX ORDER — ACETAMINOPHEN 500 MG/1
500 TABLET ORAL
Qty: 0 | Refills: 0 | Status: COMPLETED
Start: 2023-09-18

## 2023-10-13 RX ORDER — RITUXIMAB 10 MG/ML
500 INJECTION, SOLUTION INTRAVENOUS
Qty: 0 | Refills: 0 | Status: COMPLETED
Start: 2023-09-18

## 2023-10-13 RX ORDER — METHYLPREDNISOLONE SODIUM SUCCINATE 125 MG/2ML
125 INJECTION, POWDER, FOR SOLUTION INTRAMUSCULAR; INTRAVENOUS
Qty: 0 | Refills: 0 | Status: COMPLETED
Start: 2023-09-18

## 2023-10-15 NOTE — H&P ADULT - ASSESSMENT
Patient is a 66yo M with PMH significant for anasarca who presented with complaint of worsening bilateral LE edema. 
rolling walker

## 2023-10-18 ENCOUNTER — NON-APPOINTMENT (OUTPATIENT)
Age: 68
End: 2023-10-18

## 2023-10-20 ENCOUNTER — APPOINTMENT (OUTPATIENT)
Dept: RHEUMATOLOGY | Facility: CLINIC | Age: 68
End: 2023-10-20

## 2023-10-20 LAB
ALBUMIN SERPL ELPH-MCNC: 3.2 G/DL
ALP BLD-CCNC: 103 U/L
ALT SERPL-CCNC: 14 U/L
ANION GAP SERPL CALC-SCNC: 15 MMOL/L
AST SERPL-CCNC: 17 U/L
BILIRUB SERPL-MCNC: <0.2 MG/DL
BUN SERPL-MCNC: 82 MG/DL
CALCIUM SERPL-MCNC: 9.2 MG/DL
CHLORIDE SERPL-SCNC: 106 MMOL/L
CO2 SERPL-SCNC: 23 MMOL/L
CREAT SERPL-MCNC: 2.32 MG/DL
EGFR: 30 ML/MIN/1.73M2
FERRITIN SERPL-MCNC: 298 NG/ML
FOLATE SERPL-MCNC: 13.2 NG/ML
GLUCOSE SERPL-MCNC: 91 MG/DL
HCT VFR BLD CALC: 25.1 %
HGB BLD-MCNC: 7.7 G/DL
IRON SATN MFR SERPL: 10 %
IRON SERPL-MCNC: 21 UG/DL
MCHC RBC-ENTMCNC: 29.5 PG
MCHC RBC-ENTMCNC: 30.7 GM/DL
MCV RBC AUTO: 96.2 FL
PLATELET # BLD AUTO: 446 K/UL
POTASSIUM SERPL-SCNC: 5.1 MMOL/L
PROT SERPL-MCNC: 5.5 G/DL
RBC # BLD: 2.61 M/UL
RBC # FLD: 15.5 %
SODIUM SERPL-SCNC: 144 MMOL/L
TIBC SERPL-MCNC: 207 UG/DL
TSH SERPL-ACNC: 1.57 UIU/ML
UIBC SERPL-MCNC: 185 UG/DL
VIT B12 SERPL-MCNC: 435 PG/ML
WBC # FLD AUTO: 8.81 K/UL

## 2023-10-26 ENCOUNTER — APPOINTMENT (OUTPATIENT)
Dept: NEPHROLOGY | Facility: CLINIC | Age: 68
End: 2023-10-26
Payer: MEDICARE

## 2023-10-26 VITALS
WEIGHT: 180 LBS | DIASTOLIC BLOOD PRESSURE: 56 MMHG | HEIGHT: 75 IN | HEART RATE: 94 BPM | TEMPERATURE: 97.5 F | OXYGEN SATURATION: 96 % | SYSTOLIC BLOOD PRESSURE: 116 MMHG | BODY MASS INDEX: 22.38 KG/M2

## 2023-10-26 PROCEDURE — 99214 OFFICE O/P EST MOD 30 MIN: CPT

## 2023-10-26 RX ORDER — APIXABAN 5 MG/1
5 TABLET, FILM COATED ORAL
Qty: 180 | Refills: 2 | Status: ACTIVE | COMMUNITY
Start: 2023-10-26

## 2023-10-26 RX ORDER — BUMETANIDE 1 MG/1
1 TABLET ORAL TWICE DAILY
Refills: 0 | Status: DISCONTINUED | COMMUNITY
Start: 2023-10-04 | End: 2023-10-26

## 2023-10-27 LAB
ALBUMIN SERPL ELPH-MCNC: 3.1 G/DL
ANION GAP SERPL CALC-SCNC: 12 MMOL/L
APPEARANCE: CLEAR
BACTERIA: NEGATIVE /HPF
BILIRUBIN URINE: NEGATIVE
BLOOD URINE: ABNORMAL
BUN SERPL-MCNC: 59 MG/DL
CALCIUM SERPL-MCNC: 8.8 MG/DL
CAST: 2 /LPF
CHLORIDE SERPL-SCNC: 106 MMOL/L
CO2 SERPL-SCNC: 26 MMOL/L
COLOR: YELLOW
CREAT SERPL-MCNC: 2.45 MG/DL
CREAT SPEC-SCNC: 47 MG/DL
CREAT/PROT UR: 3.2 RATIO
EGFR: 28 ML/MIN/1.73M2
EPITHELIAL CELLS: 0 /HPF
GLUCOSE QUALITATIVE U: NEGATIVE MG/DL
GLUCOSE SERPL-MCNC: 95 MG/DL
KETONES URINE: NEGATIVE MG/DL
LEUKOCYTE ESTERASE URINE: NEGATIVE
MICROSCOPIC-UA: NORMAL
NITRITE URINE: NEGATIVE
PH URINE: 6
PHOSPHATE SERPL-MCNC: 4.3 MG/DL
POTASSIUM SERPL-SCNC: 4.9 MMOL/L
PROT UR-MCNC: 152 MG/DL
PROTEIN URINE: 300 MG/DL
RED BLOOD CELLS URINE: 1 /HPF
REVIEW: NORMAL
SODIUM SERPL-SCNC: 144 MMOL/L
SPECIFIC GRAVITY URINE: 1.01
UROBILINOGEN URINE: 0.2 MG/DL
WHITE BLOOD CELLS URINE: 0 /HPF

## 2023-10-30 RX ORDER — FERUMOXYTOL 510 MG/17ML
510 INJECTION INTRAVENOUS
Refills: 0 | Status: ACTIVE | OUTPATIENT
Start: 2023-10-17 | End: 1900-01-01

## 2023-11-02 ENCOUNTER — TRANSCRIPTION ENCOUNTER (OUTPATIENT)
Age: 68
End: 2023-11-02

## 2023-11-08 ENCOUNTER — APPOINTMENT (OUTPATIENT)
Dept: RHEUMATOLOGY | Facility: CLINIC | Age: 68
End: 2023-11-08

## 2023-11-14 NOTE — DISCHARGE NOTE PROVIDER - NSDCFUADDAPPT_GEN_ALL_CORE_FT
Merced Rocha NP was notified of BP of 85/64. RN awaiting orders.    Follow up with Dr. D eDios   [address] [place] [address]  [phone]  On Wednesday-7/19 at 1PM   Follow up with Dr. De Dios   [address] [place] [address]  [phone]  On Wednesday-7/19 at 1PM    Outpt nephrologist or Dr. Quintero who saw you at [facility] within 1 week.

## 2023-11-22 ENCOUNTER — APPOINTMENT (OUTPATIENT)
Dept: NEPHROLOGY | Facility: CLINIC | Age: 68
End: 2023-11-22
Payer: MEDICARE

## 2023-11-22 VITALS
TEMPERATURE: 98.2 F | OXYGEN SATURATION: 96 % | HEART RATE: 72 BPM | SYSTOLIC BLOOD PRESSURE: 97 MMHG | HEIGHT: 75 IN | DIASTOLIC BLOOD PRESSURE: 56 MMHG

## 2023-11-22 VITALS — WEIGHT: 163.14 LBS | BODY MASS INDEX: 20.39 KG/M2

## 2023-11-22 PROCEDURE — 99214 OFFICE O/P EST MOD 30 MIN: CPT | Mod: GC

## 2023-11-22 RX ORDER — METOPROLOL SUCCINATE 50 MG/1
50 TABLET, EXTENDED RELEASE ORAL
Refills: 0 | Status: DISCONTINUED | COMMUNITY
Start: 2023-08-10 | End: 2023-11-22

## 2023-11-22 RX ORDER — METOLAZONE 2.5 MG/1
2.5 TABLET ORAL DAILY
Qty: 120 | Refills: 0 | Status: DISCONTINUED | COMMUNITY
Start: 2023-10-23 | End: 2023-11-22

## 2023-11-22 RX ORDER — TACROLIMUS 1 MG/1
1 CAPSULE ORAL
Qty: 270 | Refills: 2 | Status: DISCONTINUED | COMMUNITY
Start: 2023-07-20 | End: 2023-11-22

## 2023-11-22 RX ORDER — FUROSEMIDE 80 MG/1
80 TABLET ORAL
Qty: 180 | Refills: 0 | Status: DISCONTINUED | COMMUNITY
Start: 2023-10-26 | End: 2023-11-22

## 2023-11-27 LAB
ALBUMIN SERPL ELPH-MCNC: 3.3 G/DL
ANION GAP SERPL CALC-SCNC: 16 MMOL/L
APPEARANCE: CLEAR
BACTERIA: NEGATIVE /HPF
BILIRUBIN URINE: NEGATIVE
BLOOD URINE: ABNORMAL
BUN SERPL-MCNC: 73 MG/DL
CALCIUM SERPL-MCNC: 9.1 MG/DL
CAST: 8 /LPF
CHLORIDE SERPL-SCNC: 97 MMOL/L
CO2 SERPL-SCNC: 27 MMOL/L
COLOR: YELLOW
CREAT SERPL-MCNC: 2.16 MG/DL
CREAT SPEC-SCNC: 122 MG/DL
CREAT/PROT UR: 2.7 RATIO
EGFR: 33 ML/MIN/1.73M2
EPITHELIAL CELLS: 1 /HPF
GLUCOSE QUALITATIVE U: NEGATIVE MG/DL
GLUCOSE SERPL-MCNC: 84 MG/DL
HYALINE CASTS: PRESENT
KETONES URINE: ABNORMAL MG/DL
LEUKOCYTE ESTERASE URINE: NEGATIVE
MICROSCOPIC-UA: NORMAL
NITRITE URINE: NEGATIVE
PH URINE: 5.5
PHOSPHATE SERPL-MCNC: 5 MG/DL
POTASSIUM SERPL-SCNC: 5.3 MMOL/L
PROT UR-MCNC: 324 MG/DL
PROTEIN URINE: 300 MG/DL
RED BLOOD CELLS URINE: 1 /HPF
REVIEW: NORMAL
SODIUM SERPL-SCNC: 140 MMOL/L
SPECIFIC GRAVITY URINE: 1.02
UROBILINOGEN URINE: 1 MG/DL
WHITE BLOOD CELLS URINE: 1 /HPF

## 2023-12-06 ENCOUNTER — APPOINTMENT (OUTPATIENT)
Dept: RHEUMATOLOGY | Facility: CLINIC | Age: 68
End: 2023-12-06
Payer: MEDICARE

## 2023-12-06 VITALS
OXYGEN SATURATION: 97 % | HEART RATE: 78 BPM | WEIGHT: 180 LBS | BODY MASS INDEX: 22.5 KG/M2 | WEIGHT: 180 LBS | TEMPERATURE: 97.9 F | BODY MASS INDEX: 22.5 KG/M2 | DIASTOLIC BLOOD PRESSURE: 68 MMHG | DIASTOLIC BLOOD PRESSURE: 68 MMHG | OXYGEN SATURATION: 97 % | SYSTOLIC BLOOD PRESSURE: 137 MMHG | HEART RATE: 78 BPM | TEMPERATURE: 97.9 F | RESPIRATION RATE: 16 BRPM | SYSTOLIC BLOOD PRESSURE: 137 MMHG

## 2023-12-06 VITALS — HEART RATE: 81 BPM | DIASTOLIC BLOOD PRESSURE: 80 MMHG | SYSTOLIC BLOOD PRESSURE: 152 MMHG | OXYGEN SATURATION: 97 %

## 2023-12-06 PROCEDURE — 96365 THER/PROPH/DIAG IV INF INIT: CPT

## 2023-12-07 RX ORDER — FERUMOXYTOL 510 MG/17ML
510 INJECTION INTRAVENOUS
Qty: 0 | Refills: 0 | Status: COMPLETED
Start: 2023-10-17

## 2023-12-08 ENCOUNTER — APPOINTMENT (OUTPATIENT)
Dept: NEPHROLOGY | Facility: CLINIC | Age: 68
End: 2023-12-08
Payer: MEDICARE

## 2023-12-08 ENCOUNTER — LABORATORY RESULT (OUTPATIENT)
Age: 68
End: 2023-12-08

## 2023-12-08 VITALS
OXYGEN SATURATION: 99 % | DIASTOLIC BLOOD PRESSURE: 68 MMHG | HEART RATE: 75 BPM | WEIGHT: 178.57 LBS | TEMPERATURE: 97.6 F | BODY MASS INDEX: 22.2 KG/M2 | HEIGHT: 75 IN | SYSTOLIC BLOOD PRESSURE: 134 MMHG

## 2023-12-08 DIAGNOSIS — N04.9 NEPHROTIC SYNDROME WITH UNSPECIFIED MORPHOLOGIC CHANGES: ICD-10-CM

## 2023-12-08 PROCEDURE — 99214 OFFICE O/P EST MOD 30 MIN: CPT

## 2023-12-11 ENCOUNTER — APPOINTMENT (OUTPATIENT)
Dept: RHEUMATOLOGY | Facility: CLINIC | Age: 68
End: 2023-12-11

## 2023-12-11 PROBLEM — N04.9 ANASARCA ASSOCIATED WITH DISORDER OF KIDNEY: Status: ACTIVE | Noted: 2023-10-26

## 2023-12-11 RX ORDER — OLMESARTAN MEDOXOMIL 5 MG/1
5 TABLET, FILM COATED ORAL DAILY
Qty: 120 | Refills: 0 | Status: DISCONTINUED | COMMUNITY
Start: 2023-12-11 | End: 2023-12-11

## 2023-12-12 LAB
ALBUMIN SERPL ELPH-MCNC: 3.1 G/DL
ALP BLD-CCNC: 91 U/L
ALT SERPL-CCNC: 7 U/L
ANION GAP SERPL CALC-SCNC: 13 MMOL/L
APPEARANCE: CLEAR
AST SERPL-CCNC: 16 U/L
BILIRUB SERPL-MCNC: <0.2 MG/DL
BILIRUBIN URINE: NEGATIVE
BLOOD URINE: ABNORMAL
BUN SERPL-MCNC: 59 MG/DL
CALCIUM SERPL-MCNC: 9.4 MG/DL
CHLORIDE SERPL-SCNC: 107 MMOL/L
CO2 SERPL-SCNC: 25 MMOL/L
COLOR: YELLOW
CREAT SERPL-MCNC: 1.94 MG/DL
CREAT SPEC-SCNC: 93 MG/DL
CREAT/PROT UR: 5.2 RATIO
EGFR: 37 ML/MIN/1.73M2
GLUCOSE QUALITATIVE U: NEGATIVE MG/DL
GLUCOSE SERPL-MCNC: 94 MG/DL
HCT VFR BLD CALC: 24.9 %
HGB BLD-MCNC: 7.5 G/DL
KETONES URINE: NEGATIVE MG/DL
LEUKOCYTE ESTERASE URINE: NEGATIVE
MCHC RBC-ENTMCNC: 29.3 PG
MCHC RBC-ENTMCNC: 30.1 GM/DL
MCV RBC AUTO: 97.3 FL
NITRITE URINE: NEGATIVE
PH URINE: 5.5
PLATELET # BLD AUTO: 369 K/UL
POTASSIUM SERPL-SCNC: 4.3 MMOL/L
PROT SERPL-MCNC: 5.4 G/DL
PROT UR-MCNC: 482 MG/DL
PROTEIN URINE: 300 MG/DL
RBC # BLD: 2.56 M/UL
RBC # FLD: 17 %
SODIUM SERPL-SCNC: 144 MMOL/L
SPECIFIC GRAVITY URINE: 1.02
UROBILINOGEN URINE: 0.2 MG/DL
WBC # FLD AUTO: 8.02 K/UL

## 2023-12-18 RX ORDER — VERICIGUAT 2.5 MG/1
2.5 TABLET, FILM COATED ORAL TWICE DAILY
Qty: 60 | Refills: 0 | Status: DISCONTINUED | COMMUNITY
Start: 2023-10-04 | End: 2023-12-18

## 2024-02-02 ENCOUNTER — APPOINTMENT (OUTPATIENT)
Dept: NEPHROLOGY | Facility: CLINIC | Age: 69
End: 2024-02-02

## 2024-05-14 ENCOUNTER — APPOINTMENT (OUTPATIENT)
Dept: NEPHROLOGY | Facility: CLINIC | Age: 69
End: 2024-05-14

## 2024-06-12 ENCOUNTER — APPOINTMENT (OUTPATIENT)
Dept: NEPHROLOGY | Facility: CLINIC | Age: 69
End: 2024-06-12
Payer: MEDICARE

## 2024-06-12 VITALS
WEIGHT: 182 LBS | HEIGHT: 75 IN | TEMPERATURE: 97.8 F | BODY MASS INDEX: 22.63 KG/M2 | DIASTOLIC BLOOD PRESSURE: 89 MMHG | SYSTOLIC BLOOD PRESSURE: 158 MMHG | OXYGEN SATURATION: 99 % | HEART RATE: 69 BPM

## 2024-06-12 PROCEDURE — 99214 OFFICE O/P EST MOD 30 MIN: CPT

## 2024-06-12 PROCEDURE — G2211 COMPLEX E/M VISIT ADD ON: CPT

## 2024-06-12 RX ORDER — METOPROLOL SUCCINATE 50 MG/1
50 TABLET, EXTENDED RELEASE ORAL
Qty: 90 | Refills: 2 | Status: DISCONTINUED | COMMUNITY
Start: 2023-10-12 | End: 2024-06-12

## 2024-06-12 RX ORDER — RITUXIMAB 10 MG/ML
500 INJECTION, SOLUTION INTRAVENOUS
Qty: 1 | Refills: 0 | Status: ACTIVE | OUTPATIENT
Start: 2024-06-12

## 2024-06-12 RX ORDER — CHLORHEXIDINE GLUCONATE 4 %
325 (65 FE) LIQUID (ML) TOPICAL TWICE DAILY
Qty: 180 | Refills: 3 | Status: DISCONTINUED | COMMUNITY
Start: 2023-10-26 | End: 2024-06-12

## 2024-06-12 RX ORDER — OLMESARTAN MEDOXOMIL 5 MG/1
5 TABLET, FILM COATED ORAL DAILY
Qty: 120 | Refills: 0 | Status: DISCONTINUED | COMMUNITY
Start: 2023-12-11 | End: 2024-06-12

## 2024-06-12 RX ORDER — METOLAZONE 2.5 MG/1
2.5 TABLET ORAL
Refills: 0 | Status: DISCONTINUED | COMMUNITY
Start: 2023-07-24 | End: 2024-06-12

## 2024-06-12 NOTE — ASSESSMENT
[FreeTextEntry1] : 68-year-old gentleman with NS, with bx proven MURPHY here acute visit. Cancer screening negative. No thymoma or hematologic malignancy. Received 1st dose of rituximab on 9/26 and 2nd dose 10/13. Since last visit, slow but steady improvement of edema and proteinuria. as discussed with him, sometimes anti B cell therapy takes time.  Cont diuretic ( only bumex) Eliquis can be stopped today if albumin >3.0 Check CBC and anemia workup Plan for maintained rituxan in next 1-2 months, labs sent today- 1gm X 1 for now d/w with pt plan for this year  fu 6 months

## 2024-06-12 NOTE — REVIEW OF SYSTEMS
[Feeling Poorly] : feeling poorly [Feeling Tired] : feeling tired [As noted in HPI] : as noted in HPI [Lower Ext Edema] : lower extremity edema [As Noted in HPI] : as noted in HPI [Limb Swelling] : limb swelling [Negative] : Heme/Lymph

## 2024-06-12 NOTE — PHYSICAL EXAM
[General Appearance - Alert] : alert [General Appearance - In No Acute Distress] : in no acute distress [FreeTextEntry1] : ill-looking [Sclera] : the sclera and conjunctiva were normal [Outer Ear] : the ears and nose were normal in appearance [Neck Appearance] : the appearance of the neck was normal [Respiration, Rhythm And Depth] : normal respiratory rhythm and effort [Auscultation Breath Sounds / Voice Sounds] : lungs were clear to auscultation bilaterally [Heart Rate And Rhythm] : heart rate was normal and rhythm regular [Heart Sounds] : normal S1 and S2 [Murmurs] : no murmurs [___ +] : bilateral [unfilled]+ pitting edema to the knees [Abdomen Soft] : soft [Involuntary Movements] : no involuntary movements were seen [] : no rash [No Focal Deficits] : no focal deficits [Oriented To Time, Place, And Person] : oriented to person, place, and time [Impaired Insight] : insight and judgment were intact [Affect] : the affect was normal

## 2024-06-12 NOTE — HISTORY OF PRESENT ILLNESS
[FreeTextEntry1] : 68-year-old gentleman here for MURPHY follow up. He initially started having swelling around April 2021, 1 month after her received his first shot of the COVID vaccine. He underwent kidney biopsy at [facility] but is unsure of the diagnosis. From review of outside nephrology notes and pathology report-- diagnosis was MURPHY. He was initially treated with prednisone taper but unfortunately experienced 2 relapses, which he relates temporally to his 2nd and 3rd doses of the COVID vaccine. He was treated with courses of prednisone for both relapses. In July 2023, he was admitted for anasarca and required IV diuresis. He had FRANDY with creat up to 1.8 (from baseline of 1.5). Renal US did not show hydronephrosis and renal vein dopplers were negative for RVT. UA showed hematuria and nephrotic range proteinuria. UPCR was 9.7g/g. He was also diagnosed with HFrEF. He was started on low-dose tacrolimus for his NS (2mg BID) and discharged on lasix 40mg OD and metolazone 2.5mg OD. With regards to cancer screening, his last colonoscopy was around 5 years ago and normal per his account. he has never had prostate issues. He had a chest CT during hospitalization which showed: No interstitial lung disease. Emphysema. Recommend annual lung cancer screening with low-dose chest CT if the patient meets the criteria. He was noted to be slightly anemic with hgb of 12.5 g/dL on admission. He denies any recent infections or vaccines other than COVID. He is an ex-smoker. he smoked for a couple of years but quit 40 years ago. He was very active (played tennis, swimming, gym 5 times a week) until a couple of months ago when he started having difficulty ambulating. he was told he needs a hip replacement but everything is currently on hold given kidney disease.  10/12/23: Presenting following hospitalization for sever LE edema. Also had FRANDY with creat up to 1.9. Received IV diuresis and 1 dose of rituximab on 9/26. Continued on low-dose tac 1mg BID. TTE showed EF of 29%. Weight has come down (from > 180 pounds to 176 pounds). He has continued to take prograf 1mg BID. Planned for second dose of rituximab soon. Feels lower limb edema is starting to improve. Last labs done in hospital show decrease in proteinuria from > 6g/g to 2.6 g/g. Albumin also improving.  11/22/23: pedal edema resolved suddenly 1-2 weeks ago. BP has been in the 120s at home but in the 90s in clinic (115/75mmhg on repeat). Excellent urine output but urine no longer foamy.  Continues to take bumex/metolazone but has been off prograf for almost a month. He is leaving to [place] in Dec and is potentially having hip surgery in Jan.  12/8/23: All diuretics were stopped after the last visit. Swelling returned and pt was told on 12/5 to resume Torsemide 1mg and repeat labs. He started Torsemide 1mg but still feels significant BLLE swelling. No labs done.   JUne 2024- here after 6 months in [place]. Got his ortho procedure, had infection but did ok. He eventually improved with edema. His labs from [place] showing proteinuria down to 3gm and albumin 3.2 in March 2024. Last few weeks, still foamy urine but very minimal and no edema compared to 2023. He feels so much better.

## 2024-06-13 LAB
25(OH)D3 SERPL-MCNC: 31.3 NG/ML
ALBUMIN SERPL ELPH-MCNC: 3.9 G/DL
ANION GAP SERPL CALC-SCNC: 11 MMOL/L
APPEARANCE: CLEAR
BACTERIA: NEGATIVE /HPF
BASOPHILS # BLD AUTO: 0.07 K/UL
BASOPHILS NFR BLD AUTO: 0.8 %
BILIRUBIN URINE: NEGATIVE
BLOOD URINE: ABNORMAL
BUN SERPL-MCNC: 42 MG/DL
CALCIUM SERPL-MCNC: 9.3 MG/DL
CALCIUM SERPL-MCNC: 9.3 MG/DL
CAST: 0 /LPF
CD3 CELLS # BLD: 1032 CELLS/UL
CD3 CELLS NFR BLD: 70 %
CD3+CD4+ CELLS # BLD: 862 CELLS/UL
CD3+CD4+ CELLS NFR BLD: 58 %
CD3+CD4+ CELLS/CD3+CD8+ CLL SPEC: 4.95 RATIO
CD3+CD8+ CELLS # SPEC: 174 CELLS/UL
CD3+CD8+ CELLS NFR BLD: 12 %
CHLORIDE SERPL-SCNC: 108 MMOL/L
CO2 SERPL-SCNC: 25 MMOL/L
COLOR: YELLOW
CREAT SERPL-MCNC: 1.55 MG/DL
CREAT SPEC-SCNC: 79 MG/DL
CREAT/PROT UR: 3.2 RATIO
EGFR: 49 ML/MIN/1.73M2
EOSINOPHIL # BLD AUTO: 0.31 K/UL
EOSINOPHIL NFR BLD AUTO: 3.7 %
EPITHELIAL CELLS: 0 /HPF
FERRITIN SERPL-MCNC: 202 NG/ML
GLUCOSE QUALITATIVE U: NEGATIVE MG/DL
GLUCOSE SERPL-MCNC: 94 MG/DL
HCT VFR BLD CALC: 32.3 %
HGB BLD-MCNC: 10 G/DL
IMM GRANULOCYTES NFR BLD AUTO: 0.4 %
IRON SATN MFR SERPL: 16 %
IRON SERPL-MCNC: 35 UG/DL
KETONES URINE: NEGATIVE MG/DL
LEUKOCYTE ESTERASE URINE: NEGATIVE
LYMPHOCYTES # BLD AUTO: 1.31 K/UL
LYMPHOCYTES NFR BLD AUTO: 15.7 %
MAGNESIUM SERPL-MCNC: 2.3 MG/DL
MAN DIFF?: NORMAL
MCHC RBC-ENTMCNC: 29.7 PG
MCHC RBC-ENTMCNC: 31 GM/DL
MCV RBC AUTO: 95.8 FL
MICROSCOPIC-UA: NORMAL
MONOCYTES # BLD AUTO: 0.87 K/UL
MONOCYTES NFR BLD AUTO: 10.4 %
NEUTROPHILS # BLD AUTO: 5.77 K/UL
NEUTROPHILS NFR BLD AUTO: 69 %
NITRITE URINE: NEGATIVE
PARATHYROID HORMONE INTACT: 13 PG/ML
PH URINE: 6.5
PHOSPHATE SERPL-MCNC: 3.8 MG/DL
PLATELET # BLD AUTO: 316 K/UL
POTASSIUM SERPL-SCNC: 4.9 MMOL/L
PROT UR-MCNC: 254 MG/DL
PROTEIN URINE: 300 MG/DL
RBC # BLD: 3.37 M/UL
RBC # FLD: 16.6 %
RED BLOOD CELLS URINE: 6 /HPF
SODIUM SERPL-SCNC: 145 MMOL/L
SPECIFIC GRAVITY URINE: 1.02
TIBC SERPL-MCNC: 220 UG/DL
UIBC SERPL-MCNC: 184 UG/DL
UROBILINOGEN URINE: 0.2 MG/DL
WBC # FLD AUTO: 8.36 K/UL
WHITE BLOOD CELLS URINE: 0 /HPF

## 2024-06-13 RX ORDER — BUMETANIDE 1 MG/1
1 TABLET ORAL
Qty: 60 | Refills: 5 | Status: ACTIVE | COMMUNITY
Start: 2023-11-16

## 2024-06-13 RX ORDER — OLMESARTAN MEDOXOMIL 5 MG/1
5 TABLET, FILM COATED ORAL DAILY
Qty: 90 | Refills: 3 | Status: ACTIVE | COMMUNITY
Start: 2024-06-13 | End: 1900-01-01

## 2024-06-14 LAB
HBV CORE IGG+IGM SER QL: NONREACTIVE
HBV CORE IGM SER QL: NONREACTIVE
HBV SURFACE AB SER QL: NONREACTIVE
HBV SURFACE AB SERPL IA-ACNC: <3 MIU/ML
HBV SURFACE AG SER QL: NONREACTIVE
HCV AB SER QL: NONREACTIVE
HCV S/CO RATIO: 0.34 S/CO
M TB IFN-G BLD-IMP: ABNORMAL
QUANTIFERON TB PLUS MITOGEN MINUS NIL: 0 IU/ML
QUANTIFERON TB PLUS NIL: 0.03 IU/ML
QUANTIFERON TB PLUS TB1 MINUS NIL: 0 IU/ML
QUANTIFERON TB PLUS TB2 MINUS NIL: 0 IU/ML

## 2024-06-19 ENCOUNTER — APPOINTMENT (OUTPATIENT)
Dept: GASTROENTEROLOGY | Facility: CLINIC | Age: 69
End: 2024-06-19
Payer: MEDICARE

## 2024-06-19 VITALS
DIASTOLIC BLOOD PRESSURE: 78 MMHG | SYSTOLIC BLOOD PRESSURE: 130 MMHG | BODY MASS INDEX: 22.38 KG/M2 | HEIGHT: 75 IN | RESPIRATION RATE: 16 BRPM | HEART RATE: 67 BPM | OXYGEN SATURATION: 98 % | WEIGHT: 180 LBS

## 2024-06-19 DIAGNOSIS — Z12.11 ENCOUNTER FOR SCREENING FOR MALIGNANT NEOPLASM OF COLON: ICD-10-CM

## 2024-06-19 DIAGNOSIS — D50.9 IRON DEFICIENCY ANEMIA, UNSPECIFIED: ICD-10-CM

## 2024-06-19 DIAGNOSIS — Z78.9 OTHER SPECIFIED HEALTH STATUS: ICD-10-CM

## 2024-06-19 DIAGNOSIS — N05.0 UNSPECIFIED NEPHRITIC SYNDROME WITH MINOR GLOMERULAR ABNORMALITY: ICD-10-CM

## 2024-06-19 PROCEDURE — 99204 OFFICE O/P NEW MOD 45 MIN: CPT

## 2024-06-19 RX ORDER — PEG-3350, SODIUM SULFATE, SODIUM CHLORIDE, POTASSIUM CHLORIDE, SODIUM ASCORBATE AND ASCORBIC ACID 7.5-2.691G
100 KIT ORAL
Qty: 1 | Refills: 0 | Status: ACTIVE | COMMUNITY
Start: 2024-06-19 | End: 1900-01-01

## 2024-06-19 NOTE — ASSESSMENT
[FreeTextEntry1] : 67 yo male, hx of FRANDY, minimal change glomerulopahy, initially treated with prednisone, changed to prograf, and then rituxan, with improved proteinuria to 3 gm /d and albumin 3.2 in March. Referred for colonoscopy; Last colonoscopy in 2017 with two dim tubular adenoma. He is currently with spacer in right hip in [facility]. Pending permanent hip replacement at end of Summer and left knee in winter. . Has regular bms, no rectal bleeding. Denies cp or sob. Previously very active playing tennis. Still working. No family hx of colon cancer. WRT rituxan, bsag, hcore igm neg, hcv neg.   IMP: 1. colon cancer screening 2. hx of dim TA x 2 2017 3. MCG 4. pending THR PLAN: 1. He was advised to undergo colonoscopy to which he  agreed. The procedure will be performed in [facility] with the assistance of an anesthesiologist. The procedure was explained in detail and he understood the risks of the procedure not limited  to infection, bleeding, perforation, risk of anesthesia and risk of IV site problems,emergency surgery, missed lesions  or non-diagnosis of colon cancer. He  was advised that he could not drive home alone, if the patient chooses to receive sedation. Further diagnostic and treatment recommendations will be based upon the procedure and any biopsies, if they are taken. as d/w pt, July/august appropriate interval for colonoscopy and certainly prior to eventual TKR. Detail Level: Zone

## 2024-06-25 RX ORDER — RITUXIMAB 10 MG/ML
500 INJECTION, SOLUTION INTRAVENOUS
Qty: 2 | Refills: 0 | Status: COMPLETED | OUTPATIENT
Start: 2023-08-24 | End: 2024-06-25

## 2024-07-08 DIAGNOSIS — N05.0 UNSPECIFIED NEPHRITIC SYNDROME WITH MINOR GLOMERULAR ABNORMALITY: ICD-10-CM

## 2024-07-09 ENCOUNTER — APPOINTMENT (OUTPATIENT)
Dept: RHEUMATOLOGY | Facility: CLINIC | Age: 69
End: 2024-07-09
Payer: MEDICARE

## 2024-07-09 VITALS
SYSTOLIC BLOOD PRESSURE: 159 MMHG | RESPIRATION RATE: 16 BRPM | HEART RATE: 63 BPM | DIASTOLIC BLOOD PRESSURE: 90 MMHG | OXYGEN SATURATION: 96 % | TEMPERATURE: 98.5 F

## 2024-07-09 VITALS
TEMPERATURE: 98.6 F | SYSTOLIC BLOOD PRESSURE: 145 MMHG | RESPIRATION RATE: 16 BRPM | HEART RATE: 61 BPM | DIASTOLIC BLOOD PRESSURE: 77 MMHG | OXYGEN SATURATION: 99 %

## 2024-07-09 VITALS
SYSTOLIC BLOOD PRESSURE: 145 MMHG | TEMPERATURE: 98.2 F | RESPIRATION RATE: 16 BRPM | DIASTOLIC BLOOD PRESSURE: 78 MMHG | OXYGEN SATURATION: 99 % | HEART RATE: 71 BPM

## 2024-07-09 PROCEDURE — 96415 CHEMO IV INFUSION ADDL HR: CPT

## 2024-07-09 PROCEDURE — 96413 CHEMO IV INFUSION 1 HR: CPT

## 2024-07-09 PROCEDURE — 96374 THER/PROPH/DIAG INJ IV PUSH: CPT | Mod: 59

## 2024-07-09 RX ORDER — METHYLPREDNISOLONE SODIUM SUCCINATE 125 MG/2ML
125 INJECTION, POWDER, FOR SOLUTION INTRAMUSCULAR; INTRAVENOUS
Qty: 0 | Refills: 0 | Status: COMPLETED | OUTPATIENT
Start: 2024-07-08

## 2024-07-09 RX ORDER — RITUXIMAB 10 MG/ML
500 INJECTION, SOLUTION INTRAVENOUS
Qty: 0 | Refills: 0 | Status: COMPLETED | OUTPATIENT
Start: 2024-07-08

## 2024-07-09 RX ORDER — DIPHENHYDRAMINE HCL 25 MG/1
25 TABLET ORAL
Qty: 0 | Refills: 0 | Status: COMPLETED | OUTPATIENT
Start: 2024-07-08

## 2024-07-09 RX ORDER — ACETAMINOPHEN 500 MG/1
500 TABLET, COATED ORAL
Qty: 0 | Refills: 0 | Status: COMPLETED | OUTPATIENT
Start: 2024-07-08

## 2024-07-22 RX ORDER — POLYETHYLENE GLYCOL-3350 AND ELECTROLYTES 236; 6.74; 5.86; 2.97; 22.74 G/274.31G; G/274.31G; G/274.31G; G/274.31G; G/274.31G
236 POWDER, FOR SOLUTION ORAL
Qty: 1 | Refills: 0 | Status: ACTIVE | COMMUNITY
Start: 2024-07-22 | End: 1900-01-01

## 2024-07-31 ENCOUNTER — APPOINTMENT (OUTPATIENT)
Dept: GASTROENTEROLOGY | Facility: AMBULATORY SURGERY CENTER | Age: 69
End: 2024-07-31
Payer: MEDICARE

## 2024-07-31 PROCEDURE — 45378 DIAGNOSTIC COLONOSCOPY: CPT

## 2024-09-25 ENCOUNTER — APPOINTMENT (OUTPATIENT)
Dept: NEPHROLOGY | Facility: CLINIC | Age: 69
End: 2024-09-25
Payer: MEDICARE

## 2024-09-25 VITALS
BODY MASS INDEX: 22.5 KG/M2 | TEMPERATURE: 98.1 F | HEART RATE: 63 BPM | HEIGHT: 75 IN | SYSTOLIC BLOOD PRESSURE: 162 MMHG | WEIGHT: 181 LBS | DIASTOLIC BLOOD PRESSURE: 81 MMHG | OXYGEN SATURATION: 98 %

## 2024-09-25 DIAGNOSIS — N05.0 UNSPECIFIED NEPHRITIC SYNDROME WITH MINOR GLOMERULAR ABNORMALITY: ICD-10-CM

## 2024-09-25 PROCEDURE — 99214 OFFICE O/P EST MOD 30 MIN: CPT | Mod: GC

## 2024-09-25 NOTE — HISTORY OF PRESENT ILLNESS
[FreeTextEntry1] : Mr. Stransky returns for follow-up of MURPHY. He was last seen in 6/2024.  He is a 68-year-old male who initially presented with edema in 4/2021 one month after he received his first shot of the COVID-19 vaccine, found to have proteinuria. He underwent a kidney biopsy at [facility] but is unsure of the diagnosis. From a review of outside nephrology notes and pathology report-- diagnosis was MURPHY. He was initially treated with prednisone taper but unfortunately experienced 2 relapses, which he relates temporally to his 2nd and 3rd doses of the COVID-19 vaccine. He was treated with courses of prednisone for both relapses. In July 2023, he was admitted for anasarca and required IV diuresis. He had FRANDY with creat up to 1.8 (from baseline of 1.5). Renal US did not show hydronephrosis and renal vein dopplers were negative for RVT. UA showed hematuria and nephrotic range proteinuria. UPCR was 9.7g/g. He was also diagnosed with HFrEF. He was started on low-dose tacrolimus for his NS (2mg BID) and discharged on Lasix 40mg OD and metolazone 2.5mg OD.  With regards to cancer screening, his last colonoscopy was around 5 years ago and normal per his account. he has never had prostate issues. He had a chest CT during hospitalization which showed: No interstitial lung disease. Emphysema. Recommend annual lung cancer screening with low-dose chest CT if the patient meets the criteria.  He was noted to be slightly anemic with hgb of 12.5 g/dL on admission. He denies any recent infections or vaccines other than COVID. He is an ex-smoker. he smoked for a couple of years but quit 40 years ago. He was very active (played tennis, swimming, gym 5 times a week) until a couple of months ago when he started having difficulty ambulating. he was told he needs a hip replacement but everything is currently on hold given kidney disease.  10/12/23: Presenting following hospitalization for sever LE edema. Also had FRANDY with creat up to 1.9. Received IV diuresis and 1 dose of rituximab on 9/26. Continued on low-dose tac 1mg BID. TTE showed EF of 29%. Weight has come down (from > 180 pounds to 176 pounds). He has continued to take prograf 1mg BID. Planned for second dose of rituximab soon. Feels lower limb edema is starting to improve. Last labs done in hospital show decrease in proteinuria from > 6g/g to 2.6 g/g. Albumin also improving.  11/22/23: pedal edema resolved suddenly 1-2 weeks ago. BP has been in the 120s at home but in the 90s in clinic (115/75mmhg on repeat). Excellent urine output but urine no longer foamy.  Continues to take bumex/metolazone but has been off prograf for almost a month. He is leaving to [place] in Dec and is potentially having hip surgery in Jan.  12/8/23: All diuretics were stopped after the last visit. Swelling returned and pt was told on 12/5 to resume Torsemide 1mg and repeat labs. He started Torsemide 1mg but still feels significant BLLE swelling. No labs done.   June 2024- here after 6 months in [place]. Got his ortho procedure, had infection but did ok. He eventually improved with edema. His labs from [place] showing proteinuria down to 3gm and albumin 3.2 in March 2024. Last few weeks, still foamy urine but very minimal and no edema compared to 2023. He feels so much better.   9/2024: Since last visit:  He received rituximab x2 (on 6/25 and 7/9) He stopped taking bumex and Olmesartan Foamy urine significantly less  Intermittent edema with activity and generous salt intake  Denies acute illness or hospitalizations  Does not check home BP No labs since June

## 2024-09-25 NOTE — HISTORY OF PRESENT ILLNESS
[FreeTextEntry1] : Mr. Stransky returns for follow-up of MURPHY. He was last seen in 6/2024.  He is a 68-year-old male who initially presented with edema in 4/2021 one month after he received his first shot of the COVID-19 vaccine, found to have proteinuria. He underwent a kidney biopsy at [facility] but is unsure of the diagnosis. From a review of outside nephrology notes and pathology report-- diagnosis was MURPHY. He was initially treated with prednisone taper but unfortunately experienced 2 relapses, which he relates temporally to his 2nd and 3rd doses of the COVID-19 vaccine. He was treated with courses of prednisone for both relapses. In July 2023, he was admitted for anasarca and required IV diuresis. He had FRANDY with creat up to 1.8 (from baseline of 1.5). Renal US did not show hydronephrosis and renal vein dopplers were negative for RVT. UA showed hematuria and nephrotic range proteinuria. UPCR was 9.7g/g. He was also diagnosed with HFrEF. He was started on low-dose tacrolimus for his NS (2mg BID) and discharged on Lasix 40mg OD and metolazone 2.5mg OD.  With regards to cancer screening, his last colonoscopy was around 5 years ago and normal per his account. he has never had prostate issues. He had a chest CT during hospitalization which showed: No interstitial lung disease. Emphysema. Recommend annual lung cancer screening with low-dose chest CT if the patient meets the criteria.  He was noted to be slightly anemic with hgb of 12.5 g/dL on admission. He denies any recent infections or vaccines other than COVID. He is an ex-smoker. he smoked for a couple of years but quit 40 years ago. He was very active (played tennis, swimming, gym 5 times a week) until a couple of months ago when he started having difficulty ambulating. he was told he needs a hip replacement but everything is currently on hold given kidney disease.  10/12/23: Presenting following hospitalization for sever LE edema. Also had FRANDY with creat up to 1.9. Received IV diuresis and 1 dose of rituximab on 9/26. Continued on low-dose tac 1mg BID. TTE showed EF of 29%. Weight has come down (from > 180 pounds to 176 pounds). He has continued to take prograf 1mg BID. Planned for second dose of rituximab soon. Feels lower limb edema is starting to improve. Last labs done in hospital show decrease in proteinuria from > 6g/g to 2.6 g/g. Albumin also improving.  11/22/23: pedal edema resolved suddenly 1-2 weeks ago. BP has been in the 120s at home but in the 90s in clinic (115/75mmhg on repeat). Excellent urine output but urine no longer foamy.  Continues to take bumex/metolazone but has been off prograf for almost a month. He is leaving to [place] in Dec and is potentially having hip surgery in Jan.  12/8/23: All diuretics were stopped after the last visit. Swelling returned and pt was told on 12/5 to resume Torsemide 1mg and repeat labs. He started Torsemide 1mg but still feels significant BLLE swelling. No labs done.   June 2024- here after 6 months in [place]. Got his ortho procedure, had infection but did ok. He eventually improved with edema. His labs from [place] showing proteinuria down to 3gm and albumin 3.2 in March 2024. Last few weeks, still foamy urine but very minimal and no edema compared to 2023. He feels so much better.   9/2024: Since last visit:  He received rituximab x2 (on 6/25 and 7/9) He stopped taking bumex and Olmesartan Foamy urine significantly less  Intermittent edema with activity and generous salt intake  Denies acute illness or hospitalizations  Does not check home BP No labs since June

## 2024-09-25 NOTE — ASSESSMENT
[FreeTextEntry1] : 68-year-old gentleman with NS, with biopsy-proven MURPHY returns for follow up of MURPHY. He has had recurrent relapses since his diagnosis in 2021. Most recently, admitted with anasarca in 7/2023, with uPCR 9.7 g/g, treated with diuretics, bridged with tacrolimus to rituximab x2 in 9/2023 and 10/2023 with some response, uPCR down to 3.2 g/g. Proteinuria increased again in 12/2023, bridged with tacrolimus, received rituximab in 6/2024 and 7/2024. He reports feeling better improved edema and foamy urine since last visit in June. No recent labs.  Cancer screening has been negative in the past. No thymoma or hematologic malignancy.   1. Minimal change disease: As above, with multiple relapses, last in 12/2023, treated with tacrolimus and rituximab x2 in 6/2024 and 7/2024, now off tacrolimus. He stopped taking bumex and Olmesartan on his own since last visit. - check CMP, uPCR, UA - check T cell subsets  - restart Olmesartan  - restart bumex 3x/week - check lipid panel  - we discussed importance of RAASi and diuretics - discussed importance of low sodium diet and monitoring BP at home   2. HTN: BP is uncontrolled with in-office -160s.  He stopped taking bumex and Olmesartan on his own. - restart bumex 3x/week and Olmesartan - discussed importance of low sodium diet and monitoring BP at home   Follow up based on lab results

## 2024-09-25 NOTE — PHYSICAL EXAM
[General Appearance - Alert] : alert [General Appearance - In No Acute Distress] : in no acute distress [Sclera] : the sclera and conjunctiva were normal [Outer Ear] : the ears and nose were normal in appearance [Neck Appearance] : the appearance of the neck was normal [Respiration, Rhythm And Depth] : normal respiratory rhythm and effort [Auscultation Breath Sounds / Voice Sounds] : lungs were clear to auscultation bilaterally [Heart Rate And Rhythm] : heart rate was normal and rhythm regular [Heart Sounds] : normal S1 and S2 [Murmurs] : no murmurs [___ +] : bilateral [unfilled]+ pitting edema to the knees [Abdomen Soft] : soft [Involuntary Movements] : no involuntary movements were seen [] : no rash [No Focal Deficits] : no focal deficits [Oriented To Time, Place, And Person] : oriented to person, place, and time [Impaired Insight] : insight and judgment were intact [Affect] : the affect was normal [No CVA Tenderness] : no ~M costovertebral angle tenderness [FreeTextEntry1] : 2+ BLE edema

## 2024-09-26 ENCOUNTER — NON-APPOINTMENT (OUTPATIENT)
Age: 69
End: 2024-09-26

## 2024-09-26 LAB
APPEARANCE: CLEAR
BACTERIA: NEGATIVE /HPF
BILIRUBIN URINE: NEGATIVE
BLOOD URINE: ABNORMAL
CAST: 2 /LPF
COLOR: YELLOW
CREAT SPEC-SCNC: 86 MG/DL
CREAT/PROT UR: 9 RATIO
EPITHELIAL CELLS: 0 /HPF
GLUCOSE QUALITATIVE U: NEGATIVE MG/DL
HYALINE CASTS: PRESENT
KETONES URINE: NEGATIVE MG/DL
LEUKOCYTE ESTERASE URINE: NEGATIVE
MICROSCOPIC-UA: NORMAL
NITRITE URINE: NEGATIVE
PH URINE: 6
PROT UR-MCNC: 769 MG/DL
PROTEIN URINE: 300 MG/DL
RED BLOOD CELLS URINE: 3 /HPF
REVIEW: NORMAL
SPECIFIC GRAVITY URINE: 1.02
UROBILINOGEN URINE: 0.2 MG/DL
WHITE BLOOD CELLS URINE: 1 /HPF

## 2024-09-27 ENCOUNTER — NON-APPOINTMENT (OUTPATIENT)
Age: 69
End: 2024-09-27

## 2024-10-01 LAB
ALBUMIN SERPL ELPH-MCNC: 3.5 G/DL
ALP BLD-CCNC: 125 U/L
ALT SERPL-CCNC: 9 U/L
ANION GAP SERPL CALC-SCNC: 13 MMOL/L
AST SERPL-CCNC: 18 U/L
BASOPHILS # BLD AUTO: 0.09 K/UL
BASOPHILS NFR BLD AUTO: 1.2 %
BILIRUB SERPL-MCNC: 0.2 MG/DL
BUN SERPL-MCNC: 45 MG/DL
CALCIUM SERPL-MCNC: 9.2 MG/DL
CHLORIDE SERPL-SCNC: 112 MMOL/L
CHOLEST SERPL-MCNC: 227 MG/DL
CO2 SERPL-SCNC: 26 MMOL/L
CREAT SERPL-MCNC: 1.58 MG/DL
EGFR: 47 ML/MIN/1.73M2
EOSINOPHIL # BLD AUTO: 0.27 K/UL
EOSINOPHIL NFR BLD AUTO: 3.7 %
GLUCOSE SERPL-MCNC: 91 MG/DL
HCT VFR BLD CALC: 32.6 %
HDLC SERPL-MCNC: 67 MG/DL
HGB BLD-MCNC: 10.2 G/DL
IMM GRANULOCYTES NFR BLD AUTO: 0.3 %
LDLC SERPL CALC-MCNC: 141 MG/DL
LYMPHOCYTES # BLD AUTO: 1.32 K/UL
LYMPHOCYTES NFR BLD AUTO: 18.2 %
MAN DIFF?: NORMAL
MCHC RBC-ENTMCNC: 30.5 PG
MCHC RBC-ENTMCNC: 31.3 GM/DL
MCV RBC AUTO: 97.6 FL
MONOCYTES # BLD AUTO: 0.81 K/UL
MONOCYTES NFR BLD AUTO: 11.1 %
NEUTROPHILS # BLD AUTO: 4.76 K/UL
NEUTROPHILS NFR BLD AUTO: 65.5 %
NONHDLC SERPL-MCNC: 160 MG/DL
PLATELET # BLD AUTO: 335 K/UL
POTASSIUM SERPL-SCNC: 5 MMOL/L
PROT SERPL-MCNC: 6 G/DL
RBC # BLD: 3.34 M/UL
RBC # FLD: 15.5 %
SODIUM SERPL-SCNC: 152 MMOL/L
TRIGL SERPL-MCNC: 109 MG/DL
WBC # FLD AUTO: 7.27 K/UL

## 2024-10-02 LAB
CD16+CD56+ CELLS # BLD: 139 CELLS/UL
CD16+CD56+ CELLS NFR BLD: 14 %
CD19 CELLS NFR BLD: 11 CELLS/UL
CD3 CELLS # BLD: 811 CELLS/UL
CD3 CELLS NFR BLD: 81 %
CD3+CD4+ CELLS # BLD: 659 CELLS/UL
CD3+CD4+ CELLS NFR BLD: 66 %
CD3+CD4+ CELLS/CD3+CD8+ CLL SPEC: 4.77 RATIO
CD3+CD8+ CELLS # SPEC: 138 CELLS/UL
CD3+CD8+ CELLS NFR BLD: 14 %
CELLS.CD3-CD19+/CELLS IN BLOOD: 1 %

## 2024-10-03 LAB
APTT BLD: 39.7 SEC
INR PPP: 0.92 RATIO
PT BLD: 10.9 SEC

## 2024-10-11 ENCOUNTER — OUTPATIENT (OUTPATIENT)
Dept: OUTPATIENT SERVICES | Facility: HOSPITAL | Age: 69
LOS: 1 days | End: 2024-10-11
Payer: MEDICARE

## 2024-10-11 ENCOUNTER — APPOINTMENT (OUTPATIENT)
Dept: ULTRASOUND IMAGING | Facility: HOSPITAL | Age: 69
End: 2024-10-11

## 2024-10-11 ENCOUNTER — RESULT REVIEW (OUTPATIENT)
Age: 69
End: 2024-10-11

## 2024-10-11 VITALS
HEIGHT: 76 IN | OXYGEN SATURATION: 100 % | WEIGHT: 184.09 LBS | DIASTOLIC BLOOD PRESSURE: 83 MMHG | RESPIRATION RATE: 14 BRPM | SYSTOLIC BLOOD PRESSURE: 143 MMHG | TEMPERATURE: 98 F | HEART RATE: 63 BPM

## 2024-10-11 DIAGNOSIS — N05.0 UNSPECIFIED NEPHRITIC SYNDROME WITH MINOR GLOMERULAR ABNORMALITY: ICD-10-CM

## 2024-10-11 DIAGNOSIS — Z96.651 PRESENCE OF RIGHT ARTIFICIAL KNEE JOINT: Chronic | ICD-10-CM

## 2024-10-11 PROBLEM — E11.9 TYPE 2 DIABETES MELLITUS WITHOUT COMPLICATIONS: Chronic | Status: INACTIVE | Noted: 2023-09-29 | Resolved: 2024-10-11

## 2024-10-11 PROBLEM — K21.9 GASTRO-ESOPHAGEAL REFLUX DISEASE WITHOUT ESOPHAGITIS: Chronic | Status: INACTIVE | Noted: 2023-09-29 | Resolved: 2024-10-11

## 2024-10-11 PROBLEM — I10 ESSENTIAL (PRIMARY) HYPERTENSION: Chronic | Status: INACTIVE | Noted: 2023-09-29 | Resolved: 2024-10-11

## 2024-10-11 PROCEDURE — 76942 ECHO GUIDE FOR BIOPSY: CPT | Mod: 26

## 2024-10-11 PROCEDURE — 88313 SPECIAL STAINS GROUP 2: CPT | Mod: 26

## 2024-10-11 PROCEDURE — 50200 RENAL BIOPSY PERQ: CPT | Mod: LT

## 2024-10-11 PROCEDURE — 88305 TISSUE EXAM BY PATHOLOGIST: CPT | Mod: 26

## 2024-10-11 PROCEDURE — 88350 IMFLUOR EA ADDL 1ANTB STN PX: CPT | Mod: 26

## 2024-10-11 PROCEDURE — 88348 ELECTRON MICROSCOPY DX: CPT | Mod: 26

## 2024-10-11 PROCEDURE — 88346 IMFLUOR 1ST 1ANTB STAIN PX: CPT | Mod: 26

## 2024-10-15 LAB — SURGICAL PATHOLOGY STUDY: SIGNIFICANT CHANGE UP

## 2024-10-16 ENCOUNTER — NON-APPOINTMENT (OUTPATIENT)
Age: 69
End: 2024-10-16

## 2024-10-16 DIAGNOSIS — N05.0 UNSPECIFIED NEPHRITIC SYNDROME WITH MINOR GLOMERULAR ABNORMALITY: ICD-10-CM

## 2024-10-28 ENCOUNTER — NON-APPOINTMENT (OUTPATIENT)
Age: 69
End: 2024-10-28

## 2024-10-29 DIAGNOSIS — N05.0 UNSPECIFIED NEPHRITIC SYNDROME WITH MINOR GLOMERULAR ABNORMALITY: ICD-10-CM

## 2024-10-29 RX ORDER — TACROLIMUS 1 MG/1
1 CAPSULE ORAL
Qty: 360 | Refills: 3 | Status: ACTIVE | COMMUNITY
Start: 2024-10-29 | End: 1900-01-01

## 2024-11-20 PROCEDURE — 50200 RENAL BIOPSY PERQ: CPT

## 2024-11-20 PROCEDURE — 88305 TISSUE EXAM BY PATHOLOGIST: CPT

## 2024-11-20 PROCEDURE — 88348 ELECTRON MICROSCOPY DX: CPT

## 2024-11-20 PROCEDURE — 88313 SPECIAL STAINS GROUP 2: CPT

## 2024-11-20 PROCEDURE — 88350 IMFLUOR EA ADDL 1ANTB STN PX: CPT

## 2024-11-20 PROCEDURE — 76942 ECHO GUIDE FOR BIOPSY: CPT

## 2024-11-20 PROCEDURE — 88346 IMFLUOR 1ST 1ANTB STAIN PX: CPT

## 2025-06-26 NOTE — CONSULT NOTE ADULT - SUBJECTIVE AND OBJECTIVE BOX
EKG, BP, O2 monitors applied as per protocol.  PATIENT SEEN AND EXAMINED ON :- 7/8/23  DATE OF SERVICE:     7/8/23        Interim events noted,Labs ,Radiological studies and Cardiology tests reviewed .    MR#[id number]  [facility] COURSE: HPI:  Patient is a 66yo M with PMH significant for anasarca who presented with complaint of worsening bilateral LE edema. This has been occurring for almost two years now, but worsened in the past few months. It is associated with weeping sores in bilateral lower extremities and some stinging pain. He denies changes in vision or hearing, dizziness, lightheadedness, chest pain, shortness of breath, palpitations, abdominal pain, N/V/D, dysuria, paresthesias, or numbness.    Patient reports being diagnosed with anasarca after a kidney biopsy but does not recall the specific disease he was being treated for. He follows with his PMD Dr Kashmir Crabtree and a nephrologist Dr Ritesh Bray. He has been taking prednisone for an extended duration, recently decreased to 20mg po daily. He has also been getting diuretics with furosemide, and recently was meant to take metolazone but could not receive it from the pharmacy yet.     His wife at bedside offered corroborating information. She stated he has been having decreased exercise capacity and has unintentionally lost 30lbs over the past year as well as muscle atrophy. The patient stated that he is still able to climb 2 flights of stairs regularly in his home and walk long distances without needing to stop.       Vital signs significant for: afebrile, normocardic, hypertensive to 145/98, RR 16-18, SpO2 100% on RA    Labs significant for: Hb 11.8, troponin 162 to 170, Cr 1.60 (no prior for comparison), proBNP 90014    Imaging significant for:   - CXR: chronic bilateral apical thickening   (07 Jul 2023 18:04)      INTERIM EVENTS:Patient seen at bedside ,interim events noted.      PMH -reviewed admission note, no change since admission  HEART FAILURE: Acute[ ]Chronic[ ] Systolic[ ] Diastolic[ ] Combined Systolic and Diastolic[ ]  CAD[ ] CABG[ ] PCI[ ]  DEVICES[ ] PPM[ ] ICD[ ] ILR[ ]  ATRIAL FIBRILLATION[ ] Paroxysmal[ ] Permanent[ ] CHADS2-[  ]  FRANDY[ ] CKD1[ ] CKD2[ ] CKD3[ ] CKD4[ ] ESRD[ ]  COPD[ ] HTN[ ]   DM[ ] Type1[ ] Type 2[ ]   CVA[ ] Paresis[ ]    AMBULATION: Assisted[ ] Cane/walker[ ] Independent[ ]    MEDICATIONS  (STANDING):  buMETAnide Infusion 0.5 mG/Hr (2.5 mL/Hr) IV Continuous <Continuous>  enoxaparin Injectable 60 milliGRAM(s) SubCutaneous every 12 hours  predniSONE   Tablet 20 milliGRAM(s) Oral daily    MEDICATIONS  (PRN):  acetaminophen     Tablet .. 650 milliGRAM(s) Oral every 6 hours PRN Temp greater or equal to 38C (100.4F), Mild Pain (1 - 3)  aluminum hydroxide/magnesium hydroxide/simethicone Suspension 30 milliLiter(s) Oral every 4 hours PRN Dyspepsia  melatonin 3 milliGRAM(s) Oral at bedtime PRN Insomnia  ondansetron Injectable 4 milliGRAM(s) IV Push every 8 hours PRN Nausea and/or Vomiting            REVIEW OF SYSTEMS:  Constitutional: [ ] fever, [ ]weight loss,  [ ]fatigue [ ]weight gain  Eyes: [ ] visual changes  Respiratory: [ ]shortness of breath;  [ ] cough, [ ]wheezing, [ ]chills, [ ]hemoptysis  Cardiovascular: [ ] chest pain, [ ]palpitations, [ ]dizziness,  [ ]leg swelling[ ]orthopnea[ ]PND  Gastrointestinal: [ ] abdominal pain, [ ]nausea, [ ]vomiting,  [ ]diarrhea [ ]Constipation [ ]Melena  Genitourinary: [ ] dysuria, [ ] hematuria [ ]Osorio  Neurologic: [ ] headaches [ ] tremors[ ]weakness [ ]Paralysis Right[ ] Left[ ]  Skin: [ ] itching, [ ]burning, [ ] rashes  Endocrine: [ ] heat or cold intolerance  Musculoskeletal: [ ] joint pain or swelling; [ ] muscle, back, or extremity pain  Psychiatric: [ ] depression, [ ]anxiety, [ ]mood swings, or [ ]difficulty sleeping  Hematologic: [ ] easy bruising, [ ] bleeding gums    [ ] All remaining systems negative except as per above.   [ ]Unable to obtain.  [x] No change in ROS since admission      Vital Signs Last 24 Hrs  T(C): 37.7 (08 Jul 2023 22:00), Max: 37.7 (08 Jul 2023 22:00)  T(F): 99.8 (08 Jul 2023 22:00), Max: 99.8 (08 Jul 2023 22:00)  HR: 82 (08 Jul 2023 22:00) (75 - 96)  BP: 133/79 (08 Jul 2023 22:00) (121/82 - 146/92)  BP(mean): --  RR: 17 (08 Jul 2023 22:00) (17 - 18)  SpO2: 100% (08 Jul 2023 22:00) (100% - 100%)    Parameters below as of 08 Jul 2023 22:00  Patient On (Oxygen Delivery Method): room air      I&O's Summary    07 Jul 2023 07:01  -  08 Jul 2023 07:00  --------------------------------------------------------  IN: 540 mL / OUT: 200 mL / NET: 340 mL    08 Jul 2023 07:01  -  09 Jul 2023 00:51  --------------------------------------------------------  IN: 0 mL / OUT: 3050 mL / NET: -3050 mL        PHYSICAL EXAM:  General: No acute distress BMI-  HEENT: EOMI, PERRL  Neck: Supple, [ ] JVD  Lungs: Equal air entry bilaterally; [ ] rales [ ] wheezing [ ] rhonchi  Heart: Regular rate and rhythm; [x ] murmur   2/6 [ x] systolic [ ] diastolic [ ] radiation[ ] rubs [ ]  gallops  Abdomen: Nontender, bowel sounds present  Extremities: No clubbing, cyanosis, [ ] edema [ ]Pulses  equal and intact  Nervous system:  Alert & Oriented X3, no focal deficits  Psychiatric: Normal affect  Skin: No rashes or lesions    LABS:  07-08    140  |  103  |  56<H>  ----------------------------<  84  4.2   |  30  |  1.49<H>    Ca    8.5      08 Jul 2023 06:40    TPro  5.0<L>  /  Alb  2.7<L>  /  TBili  0.3  /  DBili  x   /  AST  28  /  ALT  20  /  AlkPhos  68  07-08    Creatinine Trend: 1.49<--, 1.60<--                        10.4   7.35  )-----------( 220      ( 08 Jul 2023 06:40 )             32.7